# Patient Record
Sex: MALE | Race: WHITE | NOT HISPANIC OR LATINO | Employment: FULL TIME | URBAN - METROPOLITAN AREA
[De-identification: names, ages, dates, MRNs, and addresses within clinical notes are randomized per-mention and may not be internally consistent; named-entity substitution may affect disease eponyms.]

---

## 2022-11-25 ENCOUNTER — APPOINTMENT (OUTPATIENT)
Dept: CT IMAGING | Facility: HOSPITAL | Age: 57
End: 2022-11-25

## 2022-11-25 ENCOUNTER — HOSPITAL ENCOUNTER (EMERGENCY)
Facility: HOSPITAL | Age: 57
Discharge: HOME | End: 2022-11-25
Attending: EMERGENCY MEDICINE | Admitting: EMERGENCY MEDICINE

## 2022-11-25 VITALS
DIASTOLIC BLOOD PRESSURE: 68 MMHG | RESPIRATION RATE: 16 BRPM | BODY MASS INDEX: 28 KG/M2 | OXYGEN SATURATION: 98 % | SYSTOLIC BLOOD PRESSURE: 114 MMHG | TEMPERATURE: 97.6 F | HEIGHT: 71 IN | HEART RATE: 65 BPM | WEIGHT: 200 LBS

## 2022-11-25 DIAGNOSIS — K57.92 ACUTE DIVERTICULITIS: Primary | ICD-10-CM

## 2022-11-25 LAB
ALBUMIN SERPL-MCNC: 4.7 G/DL (ref 3.5–5.2)
ALBUMIN/GLOB SERPL: 1.7 G/DL
ALP SERPL-CCNC: 74 U/L (ref 39–117)
ALT SERPL W P-5'-P-CCNC: 36 U/L (ref 1–41)
ANION GAP SERPL CALCULATED.3IONS-SCNC: 12 MMOL/L (ref 5–15)
AST SERPL-CCNC: 27 U/L (ref 1–40)
BACTERIA UR QL AUTO: NORMAL /HPF
BASOPHILS # BLD AUTO: 0.05 10*3/MM3 (ref 0–0.2)
BASOPHILS NFR BLD AUTO: 0.5 % (ref 0–1.5)
BILIRUB SERPL-MCNC: 1.1 MG/DL (ref 0–1.2)
BILIRUB UR QL STRIP: NEGATIVE
BUN SERPL-MCNC: 11 MG/DL (ref 6–20)
BUN/CREAT SERPL: 12.4 (ref 7–25)
CALCIUM SPEC-SCNC: 9.4 MG/DL (ref 8.6–10.5)
CHLORIDE SERPL-SCNC: 101 MMOL/L (ref 98–107)
CLARITY UR: ABNORMAL
CO2 SERPL-SCNC: 25 MMOL/L (ref 22–29)
COLOR UR: ABNORMAL
CREAT SERPL-MCNC: 0.89 MG/DL (ref 0.76–1.27)
D-LACTATE SERPL-SCNC: 2 MMOL/L (ref 0.5–2)
DEPRECATED RDW RBC AUTO: 42.2 FL (ref 37–54)
EGFRCR SERPLBLD CKD-EPI 2021: 100.6 ML/MIN/1.73
EOSINOPHIL # BLD AUTO: 0.15 10*3/MM3 (ref 0–0.4)
EOSINOPHIL NFR BLD AUTO: 1.4 % (ref 0.3–6.2)
ERYTHROCYTE [DISTWIDTH] IN BLOOD BY AUTOMATED COUNT: 12.9 % (ref 12.3–15.4)
GLOBULIN UR ELPH-MCNC: 2.7 GM/DL
GLUCOSE SERPL-MCNC: 112 MG/DL (ref 65–99)
GLUCOSE UR STRIP-MCNC: NEGATIVE MG/DL
HCT VFR BLD AUTO: 42 % (ref 37.5–51)
HGB BLD-MCNC: 14.7 G/DL (ref 13–17.7)
HGB UR QL STRIP.AUTO: NEGATIVE
HOLD SPECIMEN: NORMAL
HYALINE CASTS UR QL AUTO: NORMAL /LPF
IMM GRANULOCYTES # BLD AUTO: 0.04 10*3/MM3 (ref 0–0.05)
IMM GRANULOCYTES NFR BLD AUTO: 0.4 % (ref 0–0.5)
KETONES UR QL STRIP: ABNORMAL
LEUKOCYTE ESTERASE UR QL STRIP.AUTO: ABNORMAL
LIPASE SERPL-CCNC: 61 U/L (ref 13–60)
LYMPHOCYTES # BLD AUTO: 2.2 10*3/MM3 (ref 0.7–3.1)
LYMPHOCYTES NFR BLD AUTO: 20 % (ref 19.6–45.3)
MAGNESIUM SERPL-MCNC: 2 MG/DL (ref 1.6–2.6)
MCH RBC QN AUTO: 32.1 PG (ref 26.6–33)
MCHC RBC AUTO-ENTMCNC: 35 G/DL (ref 31.5–35.7)
MCV RBC AUTO: 91.7 FL (ref 79–97)
MONOCYTES # BLD AUTO: 1.35 10*3/MM3 (ref 0.1–0.9)
MONOCYTES NFR BLD AUTO: 12.3 % (ref 5–12)
NEUTROPHILS NFR BLD AUTO: 65.4 % (ref 42.7–76)
NEUTROPHILS NFR BLD AUTO: 7.19 10*3/MM3 (ref 1.7–7)
NITRITE UR QL STRIP: NEGATIVE
NRBC BLD AUTO-RTO: 0 /100 WBC (ref 0–0.2)
PH UR STRIP.AUTO: 5.5 [PH] (ref 5–8)
PLATELET # BLD AUTO: 271 10*3/MM3 (ref 140–450)
PMV BLD AUTO: 9.1 FL (ref 6–12)
POTASSIUM SERPL-SCNC: 4 MMOL/L (ref 3.5–5.2)
PROT SERPL-MCNC: 7.4 G/DL (ref 6–8.5)
PROT UR QL STRIP: ABNORMAL
RBC # BLD AUTO: 4.58 10*6/MM3 (ref 4.14–5.8)
RBC # UR STRIP: NORMAL /HPF
REF LAB TEST METHOD: NORMAL
SODIUM SERPL-SCNC: 138 MMOL/L (ref 136–145)
SP GR UR STRIP: >=1.03 (ref 1–1.03)
SQUAMOUS #/AREA URNS HPF: NORMAL /HPF
UROBILINOGEN UR QL STRIP: ABNORMAL
WBC # UR STRIP: NORMAL /HPF
WBC NRBC COR # BLD: 10.98 10*3/MM3 (ref 3.4–10.8)
WHOLE BLOOD HOLD COAG: NORMAL
WHOLE BLOOD HOLD SPECIMEN: NORMAL

## 2022-11-25 PROCEDURE — 81001 URINALYSIS AUTO W/SCOPE: CPT | Performed by: EMERGENCY MEDICINE

## 2022-11-25 PROCEDURE — 25010000002 IOPAMIDOL 61 % SOLUTION: Performed by: EMERGENCY MEDICINE

## 2022-11-25 PROCEDURE — 96374 THER/PROPH/DIAG INJ IV PUSH: CPT

## 2022-11-25 PROCEDURE — 83735 ASSAY OF MAGNESIUM: CPT | Performed by: NURSE PRACTITIONER

## 2022-11-25 PROCEDURE — 85025 COMPLETE CBC W/AUTO DIFF WBC: CPT | Performed by: EMERGENCY MEDICINE

## 2022-11-25 PROCEDURE — 80053 COMPREHEN METABOLIC PANEL: CPT

## 2022-11-25 PROCEDURE — 83605 ASSAY OF LACTIC ACID: CPT

## 2022-11-25 PROCEDURE — 99284 EMERGENCY DEPT VISIT MOD MDM: CPT

## 2022-11-25 PROCEDURE — 83690 ASSAY OF LIPASE: CPT

## 2022-11-25 PROCEDURE — 74177 CT ABD & PELVIS W/CONTRAST: CPT

## 2022-11-25 PROCEDURE — 81003 URINALYSIS AUTO W/O SCOPE: CPT | Performed by: EMERGENCY MEDICINE

## 2022-11-25 RX ORDER — SILDENAFIL CITRATE 20 MG/1
3 TABLET ORAL DAILY PRN
COMMUNITY

## 2022-11-25 RX ORDER — HYDROCODONE BITARTRATE AND ACETAMINOPHEN 7.5; 325 MG/1; MG/1
1 TABLET ORAL EVERY 6 HOURS PRN
Qty: 12 TABLET | Refills: 0 | Status: SHIPPED | OUTPATIENT
Start: 2022-11-25

## 2022-11-25 RX ORDER — ATENOLOL 50 MG/1
50 TABLET ORAL DAILY
COMMUNITY

## 2022-11-25 RX ORDER — SODIUM CHLORIDE 9 MG/ML
10 INJECTION INTRAVENOUS AS NEEDED
Status: DISCONTINUED | OUTPATIENT
Start: 2022-11-25 | End: 2022-11-25 | Stop reason: HOSPADM

## 2022-11-25 RX ORDER — OMEPRAZOLE 40 MG/1
40 CAPSULE, DELAYED RELEASE ORAL 2 TIMES WEEKLY
COMMUNITY

## 2022-11-25 RX ORDER — FLUTICASONE PROPIONATE 50 MCG
2 SPRAY, SUSPENSION (ML) NASAL DAILY PRN
COMMUNITY

## 2022-11-25 RX ORDER — FAMOTIDINE 40 MG/1
40 TABLET, FILM COATED ORAL DAILY
COMMUNITY

## 2022-11-25 RX ORDER — ASPIRIN 81 MG/1
81 TABLET ORAL DAILY
COMMUNITY

## 2022-11-25 RX ORDER — TRIAMTERENE AND HYDROCHLOROTHIAZIDE 37.5; 25 MG/1; MG/1
1 CAPSULE ORAL DAILY
COMMUNITY

## 2022-11-25 RX ORDER — ONDANSETRON 4 MG/1
4 TABLET, ORALLY DISINTEGRATING ORAL EVERY 6 HOURS PRN
Qty: 20 TABLET | Refills: 0 | Status: SHIPPED | OUTPATIENT
Start: 2022-11-25

## 2022-11-25 RX ORDER — SODIUM CHLORIDE 0.9 % (FLUSH) 0.9 %
10 SYRINGE (ML) INJECTION AS NEEDED
Status: DISCONTINUED | OUTPATIENT
Start: 2022-11-25 | End: 2022-11-25 | Stop reason: HOSPADM

## 2022-11-25 RX ORDER — CIPROFLOXACIN 500 MG/1
500 TABLET, FILM COATED ORAL 2 TIMES DAILY
Qty: 20 TABLET | Refills: 0 | Status: SHIPPED | OUTPATIENT
Start: 2022-11-25

## 2022-11-25 RX ORDER — METRONIDAZOLE 500 MG/1
500 TABLET ORAL 2 TIMES DAILY
Qty: 14 TABLET | Refills: 0 | Status: SHIPPED | OUTPATIENT
Start: 2022-11-25

## 2022-11-25 RX ADMIN — SODIUM CHLORIDE 1000 ML: 9 INJECTION, SOLUTION INTRAVENOUS at 13:49

## 2022-11-25 RX ADMIN — IOPAMIDOL 100 ML: 612 INJECTION, SOLUTION INTRAVENOUS at 14:25

## 2022-11-25 NOTE — ED PROVIDER NOTES
EMERGENCY DEPARTMENT ENCOUNTER    Pt Name: Lobo Yu  MRN: [id number]  Pt :   1965  Room Number:    Date of encounter:  2022  PCP: Provider, No Known  ED Provider: FARA Cadena    Historian: Patient      HPI:  Chief Complaint: Abdominal pain        Context: Lobo Yu is a 57 y.o. male who presents to the ED c/o left lower quadrant abdominal pain intermittently for 2 weeks that has become consistently uncomfortable for the last 3 days.  He has noticed some chills but no fever.  He has no known history of GI disorder or abdominal surgery.  He has noticed no rectal bleeding.  He has experienced some nausea.    Review of systems is negative for fever.  Cardiovascular systems are negative.  Positive for nausea without vomiting or diarrhea or constipation or rectal pain.   systems are negative.  No profound weakness, dizziness or syncope.  No neurosensory complaint or focal weakness      PAST MEDICAL HISTORY  Past Medical History:   Diagnosis Date   • GERD (gastroesophageal reflux disease)    • Hypertension    • Pulmonary hypertension (HCC)          PAST SURGICAL HISTORY  Past Surgical History:   Procedure Laterality Date   • NEPHROLITHOTOMY     • TONSILLECTOMY           FAMILY HISTORY  History reviewed. No pertinent family history.      SOCIAL HISTORY  Social History     Socioeconomic History   • Marital status:          ALLERGIES  Amoxicillin        REVIEW OF SYSTEMS  Review of Systems     All systems reviewed and negative except for those discussed in HPI.       PHYSICAL EXAM    I have reviewed the triage vital signs and nursing notes.    ED Triage Vitals [22 1033]   Temp Heart Rate Resp BP SpO2   97.6 °F (36.4 °C) 74 16 (!) 144/101 97 %      Temp src Heart Rate Source Patient Position BP Location FiO2 (%)   Oral Monitor Sitting Left arm --       Physical Exam  GENERAL:   Appears in no acute distress.  His vital signs are normal.  He is a very good  historian  HENT: Nares patent.  EYES: No scleral icterus.  CV: Regular rhythm, regular rate.  No tachycardia.  No peripheral edema  RESPIRATORY: Normal effort.  No audible wheezes, rales or rhonchi.  ABDOMEN: Soft, very specific tenderness to the left lower quadrant without guarding.  No CVA tenderness.  MUSCULOSKELETAL: No deformities.   NEURO: Alert, moves all extremities, follows commands.  SKIN: Warm, dry, no rash visualized.        LAB RESULTS  Recent Results (from the past 24 hour(s))   Comprehensive Metabolic Panel    Collection Time: 11/25/22 10:44 AM    Specimen: Blood   Result Value Ref Range    Glucose 112 (H) 65 - 99 mg/dL    BUN 11 6 - 20 mg/dL    Creatinine 0.89 0.76 - 1.27 mg/dL    Sodium 138 136 - 145 mmol/L    Potassium 4.0 3.5 - 5.2 mmol/L    Chloride 101 98 - 107 mmol/L    CO2 25.0 22.0 - 29.0 mmol/L    Calcium 9.4 8.6 - 10.5 mg/dL    Total Protein 7.4 6.0 - 8.5 g/dL    Albumin 4.70 3.50 - 5.20 g/dL    ALT (SGPT) 36 1 - 41 U/L    AST (SGOT) 27 1 - 40 U/L    Alkaline Phosphatase 74 39 - 117 U/L    Total Bilirubin 1.1 0.0 - 1.2 mg/dL    Globulin 2.7 gm/dL    A/G Ratio 1.7 g/dL    BUN/Creatinine Ratio 12.4 7.0 - 25.0    Anion Gap 12.0 5.0 - 15.0 mmol/L    eGFR 100.6 >60.0 mL/min/1.73   Lipase    Collection Time: 11/25/22 10:44 AM    Specimen: Blood   Result Value Ref Range    Lipase 61 (H) 13 - 60 U/L   Lactic Acid, Plasma    Collection Time: 11/25/22 10:44 AM    Specimen: Blood   Result Value Ref Range    Lactate 2.0 0.5 - 2.0 mmol/L   Green Top (Gel)    Collection Time: 11/25/22 10:44 AM   Result Value Ref Range    Extra Tube Hold for add-ons.    Lavender Top    Collection Time: 11/25/22 10:44 AM   Result Value Ref Range    Extra Tube hold for add-on    Gold Top - SST    Collection Time: 11/25/22 10:44 AM   Result Value Ref Range    Extra Tube Hold for add-ons.    Gray Top    Collection Time: 11/25/22 10:44 AM   Result Value Ref Range    Extra Tube Hold for add-ons.    Light Blue Top    Collection  Time: 11/25/22 10:44 AM   Result Value Ref Range    Extra Tube Hold for add-ons.    CBC Auto Differential    Collection Time: 11/25/22 10:44 AM    Specimen: Blood   Result Value Ref Range    WBC 10.98 (H) 3.40 - 10.80 10*3/mm3    RBC 4.58 4.14 - 5.80 10*6/mm3    Hemoglobin 14.7 13.0 - 17.7 g/dL    Hematocrit 42.0 37.5 - 51.0 %    MCV 91.7 79.0 - 97.0 fL    MCH 32.1 26.6 - 33.0 pg    MCHC 35.0 31.5 - 35.7 g/dL    RDW 12.9 12.3 - 15.4 %    RDW-SD 42.2 37.0 - 54.0 fl    MPV 9.1 6.0 - 12.0 fL    Platelets 271 140 - 450 10*3/mm3    Neutrophil % 65.4 42.7 - 76.0 %    Lymphocyte % 20.0 19.6 - 45.3 %    Monocyte % 12.3 (H) 5.0 - 12.0 %    Eosinophil % 1.4 0.3 - 6.2 %    Basophil % 0.5 0.0 - 1.5 %    Immature Grans % 0.4 0.0 - 0.5 %    Neutrophils, Absolute 7.19 (H) 1.70 - 7.00 10*3/mm3    Lymphocytes, Absolute 2.20 0.70 - 3.10 10*3/mm3    Monocytes, Absolute 1.35 (H) 0.10 - 0.90 10*3/mm3    Eosinophils, Absolute 0.15 0.00 - 0.40 10*3/mm3    Basophils, Absolute 0.05 0.00 - 0.20 10*3/mm3    Immature Grans, Absolute 0.04 0.00 - 0.05 10*3/mm3    nRBC 0.0 0.0 - 0.2 /100 WBC   Magnesium    Collection Time: 11/25/22 10:44 AM    Specimen: Blood   Result Value Ref Range    Magnesium 2.0 1.6 - 2.6 mg/dL   Urinalysis With Microscopic If Indicated (No Culture) - Urine, Clean Catch    Collection Time: 11/25/22 10:46 AM    Specimen: Urine, Clean Catch   Result Value Ref Range    Color, UA Dark Yellow (A) Yellow, Straw    Appearance, UA Cloudy (A) Clear    pH, UA 5.5 5.0 - 8.0    Specific Gravity, UA >=1.030 1.001 - 1.030    Glucose, UA Negative Negative    Ketones, UA Trace (A) Negative    Bilirubin, UA Negative Negative    Blood, UA Negative Negative    Protein, UA Trace (A) Negative    Leuk Esterase, UA Trace (A) Negative    Nitrite, UA Negative Negative    Urobilinogen, UA 1.0 E.U./dL 0.2 - 1.0 E.U./dL   Urinalysis, Microscopic Only - Urine, Clean Catch    Collection Time: 11/25/22 10:46 AM    Specimen: Urine, Clean Catch   Result  Value Ref Range    RBC, UA 0-2 None Seen, 0-2 /HPF    WBC, UA 0-2 None Seen, 0-2 /HPF    Bacteria, UA None Seen None Seen, Trace /HPF    Squamous Epithelial Cells, UA 0-2 None Seen, 0-2 /HPF    Hyaline Casts, UA 0-6 0 - 6 /LPF    Methodology Automated Microscopy        If labs were ordered, I independently reviewed the results.        RADIOLOGY  CT Abdomen Pelvis With Contrast    Result Date: 11/25/2022  CT ABDOMEN PELVIS W CONTRAST-  Date of Exam: 11/25/2022 2:16 PM  Indication: LLQ abd pain; no hx of diverti dx;  hurting about 3 weeks.  Comparison: CT abdomen pelvis without contrast 1/10/2016.  Technique: Contiguous axial CT images were obtained from the lung bases to the pubic symphysis following uneventful administration of 100 cc Isovue-300 intravenous and oral contrast. Sagittal and coronal reconstructions were performed.  Automated exposure control and iterative reconstruction methods were used.  FINDINGS:  There is abnormal concentric thickening within the distal descending colon with pericolonic inflammatory stranding. Diverticular changes are present. FINDINGS are thought to reflect changes of focal acute diverticulitis. There is no evidence of gross perforation or abscess. There is no indication of upstream bowel obstruction.  The liver is steatotic, but remains of normal size. No focal liver lesion is seen.  The gallbladder, spleen, pancreas, adrenals, and left kidney are normal. Right renal cysts are present, largest in the posterior right mid kidney measuring up to 1.7 cm.  The appendix is normal. There are no pathologically enlarged lymph nodes.  Urinary bladder, prostate and rectum are normal.  There is a small esophageal hiatal hernia. Heart size is normal. The lung bases are clear. There are no acute or suspicious osseous abnormalities.        1. Acute diverticulitis within the distal descending colon. No evidence of gross diverticular perforation or abscess.    This report was finalized on  11/25/2022 2:38 PM by Ivanna Dong MD.          PROCEDURES    Procedures    No orders to display       MEDICATIONS GIVEN IN ER    Medications   Sodium Chloride (PF) 0.9 % 10 mL (has no administration in time range)   sodium chloride 0.9 % flush 10 mL (has no administration in time range)   sodium chloride 0.9 % bolus 1,000 mL (0 mL Intravenous Stopped 11/25/22 1546)   iopamidol (ISOVUE-300) 61 % injection 100 mL (100 mL Intravenous Given 11/25/22 1425)         ED Course as of 11/25/22 1734   Fri Nov 25, 2022   1234 WBC(!): 10.98 [MS]   1450 Patient's images are remarkable for acute diverticulitis.  Patient assures he can take and tolerate food and fluids in his medications as an outpatient.  We discussed parameters for concern that would warrant return to the emergency department.  Mr. Yu understands and concurs this outpatient plan and close follow-up [MS]      ED Course User Index  [MS] Pat Morales, FARA           AS OF 17:34 EST VITALS:    BP - 114/68  HR - 65  TEMP - 97.6 °F (36.4 °C) (Oral)  O2 SATS - 98%      NICHOLAS reviewed by Ozzy Alvarez DO           DIAGNOSIS  Final diagnoses:   Acute diverticulitis         DISPOSITION    DISCHARGE    Patient discharged in stable condition.    Reviewed implications of results, diagnosis, meds, responsibility to follow up, warning signs and symptoms of possible worsening, potential complications and reasons to return to ER.    Patient/Family voiced understanding of above instructions.    Discussed plan for discharge, as there is no emergent indication for admission.  Pt/family is agreeable and understands need for follow up and possible repeat testing.  Pt/family is aware that discharge does not mean that nothing is wrong but that it indicates no emergency is currently present that requires admission and they must continue care with follow-up as given below or with a physician of their choice.     FOLLOW-UP  Stefan Shannon MD  [id number]  [facility] [address]  [place] [id number] [phone]               Medication List      New Prescriptions    ciprofloxacin 500 MG tablet  Commonly known as: CIPRO  Take 1 tablet by mouth 2 (Two) Times a Day.     HYDROcodone-acetaminophen 7.5-325 MG per tablet  Commonly known as: NORCO  Take 1 tablet by mouth Every 6 (Six) Hours As Needed for Severe Pain.     metroNIDAZOLE 500 MG tablet  Commonly known as: FLAGYL  Take 1 tablet by mouth 2 (Two) Times a Day.     ondansetron ODT 4 MG disintegrating tablet  Commonly known as: ZOFRAN-ODT  Place 1 tablet on the tongue Every 6 (Six) Hours As Needed for Nausea or Vomiting.           Where to Get Your Medications      These medications were sent to [facility]/pharm[facility] #[id number] - [place], [place] - [address] [facility] [phone]  - [phone]   [address] [place] [id number]    Hours: 24-hours Phone: [phone]   · ciprofloxacin 500 MG tablet  · HYDROcodone-acetaminophen 7.5-325 MG per tablet  · metroNIDAZOLE 500 MG tablet  · ondansetron ODT 4 MG disintegrating tablet                  Pat Morales, APRN  11/25/22 173

## 2022-11-25 NOTE — DISCHARGE INSTRUCTIONS
Home to rest.  Maintain your very best hydration and nutrition.  Start your medications this evening.  Do not take any of them with an empty stomach, but rather with ample food and fluids.  Use the prescription pain medication for that pain that breaks through ibuprofen 600 mg every 8 hours.  Return to the emergency department as needed for worsening symptoms or concerns.  Thank you

## 2023-08-10 ENCOUNTER — DOCUMENTATION (OUTPATIENT)
Dept: GASTROENTEROLOGY | Facility: CLINIC | Age: 58
End: 2023-08-10
Payer: COMMERCIAL

## 2023-08-10 RX ORDER — METRONIDAZOLE 500 MG/1
500 TABLET ORAL 3 TIMES DAILY
Qty: 30 TABLET | Refills: 0 | Status: SHIPPED | OUTPATIENT
Start: 2023-08-10 | End: 2023-08-20

## 2023-08-10 RX ORDER — CIPROFLOXACIN 500 MG/1
500 TABLET, FILM COATED ORAL 2 TIMES DAILY
Qty: 20 TABLET | Refills: 0 | Status: SHIPPED | OUTPATIENT
Start: 2023-08-10 | End: 2023-08-20

## 2023-10-30 RX ORDER — SODIUM PICOSULFATE, MAGNESIUM OXIDE, AND ANHYDROUS CITRIC ACID 10; 3.5; 12 MG/160ML; G/160ML; G/160ML
350 LIQUID ORAL TAKE AS DIRECTED
Qty: 350 ML | Refills: 0 | Status: SHIPPED | OUTPATIENT
Start: 2023-10-30

## 2023-11-06 ENCOUNTER — OUTSIDE FACILITY SERVICE (OUTPATIENT)
Dept: GASTROENTEROLOGY | Facility: CLINIC | Age: 58
End: 2023-11-06
Payer: COMMERCIAL

## 2023-11-06 PROCEDURE — 45385 COLONOSCOPY W/LESION REMOVAL: CPT | Performed by: INTERNAL MEDICINE

## 2023-11-06 PROCEDURE — 45390 COLONOSCOPY W/RESECTION: CPT | Performed by: INTERNAL MEDICINE

## 2023-11-06 PROCEDURE — 88305 TISSUE EXAM BY PATHOLOGIST: CPT

## 2023-11-07 ENCOUNTER — TELEPHONE (OUTPATIENT)
Dept: GASTROENTEROLOGY | Facility: CLINIC | Age: 58
End: 2023-11-07

## 2023-11-07 ENCOUNTER — LAB REQUISITION (OUTPATIENT)
Dept: LAB | Facility: HOSPITAL | Age: 58
End: 2023-11-07
Payer: COMMERCIAL

## 2023-11-07 DIAGNOSIS — K57.30 DIVERTICULOSIS OF LARGE INTESTINE WITHOUT PERFORATION OR ABSCESS WITHOUT BLEEDING: ICD-10-CM

## 2023-11-07 DIAGNOSIS — Z12.11 ENCOUNTER FOR SCREENING FOR MALIGNANT NEOPLASM OF COLON: ICD-10-CM

## 2023-11-07 DIAGNOSIS — D12.0 BENIGN NEOPLASM OF CECUM: ICD-10-CM

## 2023-11-07 DIAGNOSIS — D12.5 BENIGN NEOPLASM OF SIGMOID COLON: ICD-10-CM

## 2023-11-07 DIAGNOSIS — K64.8 OTHER HEMORRHOIDS: ICD-10-CM

## 2023-11-07 NOTE — TELEPHONE ENCOUNTER
Patient said he is having abdominal pain and it feels like when he had diverticulitis. He just wanted you to be aware. 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

## 2023-11-07 NOTE — TELEPHONE ENCOUNTER
Hub staff attempted to follow warm transfer process and was unsuccessful     Caller: Lobo Yu    Relationship to patient: Self    Best call back number: [phone]     Patient is needing: PT IS HAVING ABNORMAL ABDOMINAL PAIN POST COLONOSCOPY. HE WAS CALLING TO UPDATE SYMPTOMS AS DIRECTED BY PHYSICIAN. PLEASE CALL AND ADVISE.

## 2023-11-08 ENCOUNTER — TELEPHONE (OUTPATIENT)
Dept: GASTROENTEROLOGY | Facility: CLINIC | Age: 58
End: 2023-11-08

## 2023-11-08 RX ORDER — METRONIDAZOLE 500 MG/1
500 TABLET ORAL 3 TIMES DAILY
Qty: 30 TABLET | Refills: 0 | Status: SHIPPED | OUTPATIENT
Start: 2023-11-08

## 2023-11-08 NOTE — TELEPHONE ENCOUNTER
Hub staff attempted to follow warm transfer process and was unsuccessful     Caller: JACOB CUEVA    Relationship to patient: SELF    Best call back number: [phone]    Patient is needing: STILL EXPERIENCING PAIN FROM COLONOSCOPY, DR. DE LEON LEFT HIM A MESSAGE SAYING ANTIBIOTICS COULD BE CALLED IN. 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

## 2023-11-08 NOTE — TELEPHONE ENCOUNTER
Spoke with patient and he advised he doesn't have a fever. I advised if he doesn't have a fever and he rates his pain at a 4. He states if it gets worse or he develops a fever he will go to ED.

## 2023-11-09 LAB — REF LAB TEST METHOD: NORMAL

## 2023-11-17 ENCOUNTER — TELEPHONE (OUTPATIENT)
Dept: GASTROENTEROLOGY | Facility: CLINIC | Age: 58
End: 2023-11-17

## 2023-11-17 DIAGNOSIS — K57.92 DIVERTICULITIS: Primary | ICD-10-CM

## 2023-11-17 RX ORDER — DICYCLOMINE HYDROCHLORIDE 10 MG/1
20 CAPSULE ORAL 3 TIMES DAILY PRN
Qty: 180 CAPSULE | Refills: 3 | Status: ON HOLD | OUTPATIENT
Start: 2023-11-17

## 2023-11-17 RX ORDER — METRONIDAZOLE 500 MG/1
500 TABLET ORAL 3 TIMES DAILY
Qty: 30 TABLET | Refills: 0 | Status: ON HOLD | OUTPATIENT
Start: 2023-11-17

## 2023-11-17 RX ORDER — CIPROFLOXACIN 500 MG/1
500 TABLET, FILM COATED ORAL 2 TIMES DAILY
Qty: 20 TABLET | Refills: 0 | Status: ON HOLD | OUTPATIENT
Start: 2023-11-17

## 2023-11-17 NOTE — TELEPHONE ENCOUNTER
I spoke with patient and he is in a lot of pain, still no fever. He is in [place] and doesn't want to go to the er there, he is coming back Sunday. He wants to know what you suggest and possibly give him a call if you can.

## 2023-11-17 NOTE — TELEPHONE ENCOUNTER
Hub staff attempted to follow warm transfer process and was unsuccessful     Caller: Lobo Yu    Relationship to patient: Self    Best call back number: [phone]     Patient is needing: PATIENT HAD A PROCEDURE WITH DR DE LEON ON 11/6 AND HE WAS PRESCRIBED SOME MEDS AND WAS FEELING OK HOWEVER PT TOOK A TURN FOR THE WORST ON 11/16 AND HAVING A LOT OF PAINS AGAIN. WOULD LIKE TO SPEAK WITH DR DE LEON. PLEASE HAVE HIM CALL THE PATIENT.

## 2023-11-20 ENCOUNTER — HOSPITAL ENCOUNTER (INPATIENT)
Facility: HOSPITAL | Age: 58
LOS: 7 days | Discharge: HOME HEALTH | DRG: 329 | End: 2023-11-27
Attending: EMERGENCY MEDICINE | Admitting: INTERNAL MEDICINE
Payer: COMMERCIAL

## 2023-11-20 ENCOUNTER — APPOINTMENT (OUTPATIENT)
Dept: CT IMAGING | Facility: HOSPITAL | Age: 58
DRG: 329 | End: 2023-11-20
Payer: COMMERCIAL

## 2023-11-20 ENCOUNTER — ANESTHESIA EVENT CONVERTED (OUTPATIENT)
Dept: ANESTHESIOLOGY | Facility: HOSPITAL | Age: 58
DRG: 329 | End: 2023-11-20
Payer: COMMERCIAL

## 2023-11-20 ENCOUNTER — ANESTHESIA EVENT (OUTPATIENT)
Dept: PERIOP | Facility: HOSPITAL | Age: 58
End: 2023-11-20
Payer: COMMERCIAL

## 2023-11-20 ENCOUNTER — ANESTHESIA (OUTPATIENT)
Dept: PERIOP | Facility: HOSPITAL | Age: 58
End: 2023-11-20
Payer: COMMERCIAL

## 2023-11-20 DIAGNOSIS — K57.80 PERFORATED DIVERTICULUM: ICD-10-CM

## 2023-11-20 DIAGNOSIS — K57.92 DIVERTICULITIS: ICD-10-CM

## 2023-11-20 DIAGNOSIS — K57.20 DIVERTICULITIS OF COLON WITH PERFORATION: Primary | ICD-10-CM

## 2023-11-20 DIAGNOSIS — I27.20 PULMONARY HYPERTENSION: ICD-10-CM

## 2023-11-20 DIAGNOSIS — I10 ESSENTIAL HYPERTENSION: ICD-10-CM

## 2023-11-20 LAB
ABO GROUP BLD: NORMAL
ABO GROUP BLD: NORMAL
ALBUMIN SERPL-MCNC: 4 G/DL (ref 3.5–5.2)
ALBUMIN/GLOB SERPL: 1.2 G/DL
ALP SERPL-CCNC: 71 U/L (ref 39–117)
ALT SERPL W P-5'-P-CCNC: 19 U/L (ref 1–41)
ANION GAP SERPL CALCULATED.3IONS-SCNC: 11 MMOL/L (ref 5–15)
AST SERPL-CCNC: 16 U/L (ref 1–40)
BACTERIA UR QL AUTO: NORMAL /HPF
BASOPHILS # BLD AUTO: 0.02 10*3/MM3 (ref 0–0.2)
BASOPHILS NFR BLD AUTO: 0.2 % (ref 0–1.5)
BILIRUB SERPL-MCNC: 0.8 MG/DL (ref 0–1.2)
BILIRUB UR QL STRIP: NEGATIVE
BLD GP AB SCN SERPL QL: NEGATIVE
BUN SERPL-MCNC: 10 MG/DL (ref 6–20)
BUN/CREAT SERPL: 10.1 (ref 7–25)
CALCIUM SPEC-SCNC: 9.1 MG/DL (ref 8.6–10.5)
CHLORIDE SERPL-SCNC: 102 MMOL/L (ref 98–107)
CLARITY UR: CLEAR
CO2 SERPL-SCNC: 27 MMOL/L (ref 22–29)
COLOR UR: YELLOW
CREAT SERPL-MCNC: 0.99 MG/DL (ref 0.76–1.27)
D-LACTATE SERPL-SCNC: 1 MMOL/L (ref 0.5–2)
DEPRECATED RDW RBC AUTO: 44.3 FL (ref 37–54)
EGFRCR SERPLBLD CKD-EPI 2021: 88.9 ML/MIN/1.73
EOSINOPHIL # BLD AUTO: 0.14 10*3/MM3 (ref 0–0.4)
EOSINOPHIL NFR BLD AUTO: 1.4 % (ref 0.3–6.2)
ERYTHROCYTE [DISTWIDTH] IN BLOOD BY AUTOMATED COUNT: 12.8 % (ref 12.3–15.4)
GLOBULIN UR ELPH-MCNC: 3.4 GM/DL
GLUCOSE SERPL-MCNC: 122 MG/DL (ref 65–99)
GLUCOSE UR STRIP-MCNC: NEGATIVE MG/DL
HCT VFR BLD AUTO: 41.5 % (ref 37.5–51)
HGB BLD-MCNC: 14 G/DL (ref 13–17.7)
HGB UR QL STRIP.AUTO: NEGATIVE
HOLD SPECIMEN: NORMAL
HYALINE CASTS UR QL AUTO: NORMAL /LPF
IMM GRANULOCYTES # BLD AUTO: 0.06 10*3/MM3 (ref 0–0.05)
IMM GRANULOCYTES NFR BLD AUTO: 0.6 % (ref 0–0.5)
KETONES UR QL STRIP: NEGATIVE
LEUKOCYTE ESTERASE UR QL STRIP.AUTO: ABNORMAL
LIPASE SERPL-CCNC: 49 U/L (ref 13–60)
LYMPHOCYTES # BLD AUTO: 1.3 10*3/MM3 (ref 0.7–3.1)
LYMPHOCYTES NFR BLD AUTO: 12.9 % (ref 19.6–45.3)
MCH RBC QN AUTO: 31.6 PG (ref 26.6–33)
MCHC RBC AUTO-ENTMCNC: 33.7 G/DL (ref 31.5–35.7)
MCV RBC AUTO: 93.7 FL (ref 79–97)
MONOCYTES # BLD AUTO: 1.18 10*3/MM3 (ref 0.1–0.9)
MONOCYTES NFR BLD AUTO: 11.7 % (ref 5–12)
NEUTROPHILS NFR BLD AUTO: 7.41 10*3/MM3 (ref 1.7–7)
NEUTROPHILS NFR BLD AUTO: 73.2 % (ref 42.7–76)
NITRITE UR QL STRIP: NEGATIVE
NRBC BLD AUTO-RTO: 0 /100 WBC (ref 0–0.2)
PH UR STRIP.AUTO: 5.5 [PH] (ref 5–8)
PLATELET # BLD AUTO: 285 10*3/MM3 (ref 140–450)
PMV BLD AUTO: 9.8 FL (ref 6–12)
POTASSIUM SERPL-SCNC: 3.6 MMOL/L (ref 3.5–5.2)
PROCALCITONIN SERPL-MCNC: 0.34 NG/ML (ref 0–0.25)
PROT SERPL-MCNC: 7.4 G/DL (ref 6–8.5)
PROT UR QL STRIP: NEGATIVE
RBC # BLD AUTO: 4.43 10*6/MM3 (ref 4.14–5.8)
RBC # UR STRIP: NORMAL /HPF
REF LAB TEST METHOD: NORMAL
RH BLD: NEGATIVE
RH BLD: NEGATIVE
SODIUM SERPL-SCNC: 140 MMOL/L (ref 136–145)
SP GR UR STRIP: 1.02 (ref 1–1.03)
SQUAMOUS #/AREA URNS HPF: NORMAL /HPF
T&S EXPIRATION DATE: NORMAL
UROBILINOGEN UR QL STRIP: ABNORMAL
WBC # UR STRIP: NORMAL /HPF
WBC NRBC COR # BLD AUTO: 10.11 10*3/MM3 (ref 3.4–10.8)
WHOLE BLOOD HOLD COAG: NORMAL
WHOLE BLOOD HOLD SPECIMEN: NORMAL

## 2023-11-20 PROCEDURE — 25810000003 SODIUM CHLORIDE 0.9 % SOLUTION: Performed by: EMERGENCY MEDICINE

## 2023-11-20 PROCEDURE — 25010000002 PIPERACILLIN SOD-TAZOBACTAM PER 1 G: Performed by: EMERGENCY MEDICINE

## 2023-11-20 PROCEDURE — G0378 HOSPITAL OBSERVATION PER HR: HCPCS

## 2023-11-20 PROCEDURE — 25010000002 HYDROMORPHONE PER 4 MG: Performed by: FAMILY MEDICINE

## 2023-11-20 PROCEDURE — 25010000002 HYDROMORPHONE PER 4 MG: Performed by: ANESTHESIOLOGY

## 2023-11-20 PROCEDURE — 25810000003 SODIUM CHLORIDE 0.9 % SOLUTION: Performed by: FAMILY MEDICINE

## 2023-11-20 PROCEDURE — 88307 TISSUE EXAM BY PATHOLOGIST: CPT | Performed by: SURGERY

## 2023-11-20 PROCEDURE — 0DTJ0ZZ RESECTION OF APPENDIX, OPEN APPROACH: ICD-10-PCS | Performed by: SURGERY

## 2023-11-20 PROCEDURE — 25510000001 IOPAMIDOL PER 1 ML: Performed by: EMERGENCY MEDICINE

## 2023-11-20 PROCEDURE — 25010000002 ERTAPENEM PER 500 MG: Performed by: FAMILY MEDICINE

## 2023-11-20 PROCEDURE — 74177 CT ABD & PELVIS W/CONTRAST: CPT

## 2023-11-20 PROCEDURE — 25010000002 ONDANSETRON PER 1 MG: Performed by: EMERGENCY MEDICINE

## 2023-11-20 PROCEDURE — 25010000002 SUGAMMADEX 200 MG/2ML SOLUTION: Performed by: ANESTHESIOLOGY

## 2023-11-20 PROCEDURE — 25010000002 FENTANYL CITRATE (PF) 50 MCG/ML SOLUTION: Performed by: ANESTHESIOLOGY

## 2023-11-20 PROCEDURE — 0DBN0ZZ EXCISION OF SIGMOID COLON, OPEN APPROACH: ICD-10-PCS | Performed by: SURGERY

## 2023-11-20 PROCEDURE — 86901 BLOOD TYPING SEROLOGIC RH(D): CPT

## 2023-11-20 PROCEDURE — 25810000003 LACTATED RINGERS PER 1000 ML: Performed by: ANESTHESIOLOGY

## 2023-11-20 PROCEDURE — 0D1N0Z4 BYPASS SIGMOID COLON TO CUTANEOUS, OPEN APPROACH: ICD-10-PCS | Performed by: SURGERY

## 2023-11-20 PROCEDURE — 25010000002 BUPIVACAINE (PF) 0.25 % SOLUTION: Performed by: NURSE ANESTHETIST, CERTIFIED REGISTERED

## 2023-11-20 PROCEDURE — 83690 ASSAY OF LIPASE: CPT | Performed by: EMERGENCY MEDICINE

## 2023-11-20 PROCEDURE — 25010000002 PROPOFOL 10 MG/ML EMULSION: Performed by: ANESTHESIOLOGY

## 2023-11-20 PROCEDURE — 36415 COLL VENOUS BLD VENIPUNCTURE: CPT

## 2023-11-20 PROCEDURE — 25010000002 DEXAMETHASONE PER 1 MG: Performed by: ANESTHESIOLOGY

## 2023-11-20 PROCEDURE — 83605 ASSAY OF LACTIC ACID: CPT | Performed by: EMERGENCY MEDICINE

## 2023-11-20 PROCEDURE — 80053 COMPREHEN METABOLIC PANEL: CPT | Performed by: EMERGENCY MEDICINE

## 2023-11-20 PROCEDURE — 87040 BLOOD CULTURE FOR BACTERIA: CPT | Performed by: EMERGENCY MEDICINE

## 2023-11-20 PROCEDURE — 25810000003 LACTATED RINGERS PER 1000 ML: Performed by: SURGERY

## 2023-11-20 PROCEDURE — 84145 PROCALCITONIN (PCT): CPT | Performed by: EMERGENCY MEDICINE

## 2023-11-20 PROCEDURE — 25010000002 FENTANYL CITRATE (PF) 50 MCG/ML SOLUTION

## 2023-11-20 PROCEDURE — 99223 1ST HOSP IP/OBS HIGH 75: CPT | Performed by: FAMILY MEDICINE

## 2023-11-20 PROCEDURE — 88302 TISSUE EXAM BY PATHOLOGIST: CPT | Performed by: SURGERY

## 2023-11-20 PROCEDURE — 0W9G0ZZ DRAINAGE OF PERITONEAL CAVITY, OPEN APPROACH: ICD-10-PCS | Performed by: SURGERY

## 2023-11-20 PROCEDURE — 25010000002 ERTAPENEM PER 500 MG: Performed by: INTERNAL MEDICINE

## 2023-11-20 PROCEDURE — 99285 EMERGENCY DEPT VISIT HI MDM: CPT

## 2023-11-20 PROCEDURE — 85025 COMPLETE CBC W/AUTO DIFF WBC: CPT | Performed by: EMERGENCY MEDICINE

## 2023-11-20 PROCEDURE — 86900 BLOOD TYPING SEROLOGIC ABO: CPT

## 2023-11-20 PROCEDURE — 81001 URINALYSIS AUTO W/SCOPE: CPT | Performed by: EMERGENCY MEDICINE

## 2023-11-20 PROCEDURE — 86850 RBC ANTIBODY SCREEN: CPT | Performed by: SURGERY

## 2023-11-20 PROCEDURE — 86901 BLOOD TYPING SEROLOGIC RH(D): CPT | Performed by: SURGERY

## 2023-11-20 PROCEDURE — 25010000002 DEXAMETHASONE SODIUM PHOSPHATE 10 MG/ML SOLUTION: Performed by: NURSE ANESTHETIST, CERTIFIED REGISTERED

## 2023-11-20 PROCEDURE — 25810000003 SODIUM CHLORIDE 0.9 % SOLUTION: Performed by: SURGERY

## 2023-11-20 PROCEDURE — 86900 BLOOD TYPING SEROLOGIC ABO: CPT | Performed by: SURGERY

## 2023-11-20 PROCEDURE — 25010000002 LIDOCAINE 1 % SOLUTION: Performed by: ANESTHESIOLOGY

## 2023-11-20 DEVICE — PROXIMATE RELOADABLE LINEAR CUTTER WITH SAFETY LOCK-OUT.  55MM LINEAR CUTTER.
Type: IMPLANTABLE DEVICE | Site: ABDOMEN | Status: FUNCTIONAL
Brand: PROXIMATE

## 2023-11-20 DEVICE — ECHELON CONTOUR GST RELOAD GREEN
Type: IMPLANTABLE DEVICE | Site: ABDOMEN | Status: FUNCTIONAL
Brand: ECHELON

## 2023-11-20 DEVICE — ECHELON CONTOUR W/ GREEN RELOAD
Type: IMPLANTABLE DEVICE | Site: ABDOMEN | Status: FUNCTIONAL
Brand: ECHELON

## 2023-11-20 DEVICE — LIGACLIP MCA MULTIPLE CLIP APPLIERS, 20 MEDIUM CLIPS
Type: IMPLANTABLE DEVICE | Site: ABDOMEN | Status: FUNCTIONAL
Brand: LIGACLIP

## 2023-11-20 RX ORDER — SODIUM CHLORIDE 9 MG/ML
10 INJECTION INTRAVENOUS AS NEEDED
Status: DISCONTINUED | OUTPATIENT
Start: 2023-11-20 | End: 2023-11-27 | Stop reason: HOSPADM

## 2023-11-20 RX ORDER — AMOXICILLIN 250 MG
2 CAPSULE ORAL 2 TIMES DAILY
Status: DISCONTINUED | OUTPATIENT
Start: 2023-11-20 | End: 2023-11-21

## 2023-11-20 RX ORDER — MORPHINE SULFATE 2 MG/ML
2 INJECTION, SOLUTION INTRAMUSCULAR; INTRAVENOUS EVERY 4 HOURS PRN
Status: DISCONTINUED | OUTPATIENT
Start: 2023-11-20 | End: 2023-11-21 | Stop reason: SDUPTHER

## 2023-11-20 RX ORDER — HYDROCODONE BITARTRATE AND ACETAMINOPHEN 5; 325 MG/1; MG/1
1 TABLET ORAL EVERY 4 HOURS PRN
Status: DISCONTINUED | OUTPATIENT
Start: 2023-11-20 | End: 2023-11-21 | Stop reason: SDUPTHER

## 2023-11-20 RX ORDER — MORPHINE SULFATE 4 MG/ML
4 INJECTION, SOLUTION INTRAMUSCULAR; INTRAVENOUS EVERY 4 HOURS PRN
Status: DISCONTINUED | OUTPATIENT
Start: 2023-11-20 | End: 2023-11-20

## 2023-11-20 RX ORDER — SODIUM CHLORIDE 9 MG/ML
40 INJECTION, SOLUTION INTRAVENOUS AS NEEDED
Status: CANCELLED | OUTPATIENT
Start: 2023-11-20

## 2023-11-20 RX ORDER — LIDOCAINE HYDROCHLORIDE 10 MG/ML
0.5 INJECTION, SOLUTION EPIDURAL; INFILTRATION; INTRACAUDAL; PERINEURAL ONCE AS NEEDED
Status: CANCELLED | OUTPATIENT
Start: 2023-11-20

## 2023-11-20 RX ORDER — EPHEDRINE SULFATE 50 MG/ML
INJECTION, SOLUTION INTRAVENOUS AS NEEDED
Status: DISCONTINUED | OUTPATIENT
Start: 2023-11-20 | End: 2023-11-20 | Stop reason: SURG

## 2023-11-20 RX ORDER — SODIUM CHLORIDE 9 MG/ML
40 INJECTION, SOLUTION INTRAVENOUS AS NEEDED
Status: DISCONTINUED | OUTPATIENT
Start: 2023-11-20 | End: 2023-11-27 | Stop reason: HOSPADM

## 2023-11-20 RX ORDER — FENTANYL CITRATE 50 UG/ML
50 INJECTION, SOLUTION INTRAMUSCULAR; INTRAVENOUS
Status: DISCONTINUED | OUTPATIENT
Start: 2023-11-20 | End: 2023-11-20 | Stop reason: HOSPADM

## 2023-11-20 RX ORDER — ONDANSETRON 4 MG/1
4 TABLET, FILM COATED ORAL EVERY 6 HOURS PRN
Status: DISCONTINUED | OUTPATIENT
Start: 2023-11-20 | End: 2023-11-27 | Stop reason: HOSPADM

## 2023-11-20 RX ORDER — ONDANSETRON 2 MG/ML
4 INJECTION INTRAMUSCULAR; INTRAVENOUS EVERY 6 HOURS PRN
Status: DISCONTINUED | OUTPATIENT
Start: 2023-11-20 | End: 2023-11-20 | Stop reason: SDUPTHER

## 2023-11-20 RX ORDER — HYDROCODONE BITARTRATE AND ACETAMINOPHEN 5; 325 MG/1; MG/1
1 TABLET ORAL EVERY 6 HOURS PRN
Status: DISCONTINUED | OUTPATIENT
Start: 2023-11-20 | End: 2023-11-20 | Stop reason: SDUPTHER

## 2023-11-20 RX ORDER — FAMOTIDINE 10 MG/ML
20 INJECTION, SOLUTION INTRAVENOUS ONCE
Status: CANCELLED | OUTPATIENT
Start: 2023-11-20 | End: 2023-11-20

## 2023-11-20 RX ORDER — PROPOFOL 10 MG/ML
VIAL (ML) INTRAVENOUS AS NEEDED
Status: DISCONTINUED | OUTPATIENT
Start: 2023-11-20 | End: 2023-11-20 | Stop reason: SURG

## 2023-11-20 RX ORDER — SODIUM CHLORIDE 0.9 % (FLUSH) 0.9 %
10 SYRINGE (ML) INJECTION AS NEEDED
Status: CANCELLED | OUTPATIENT
Start: 2023-11-20

## 2023-11-20 RX ORDER — ACETAMINOPHEN 650 MG/1
650 SUPPOSITORY RECTAL EVERY 4 HOURS PRN
Status: DISCONTINUED | OUTPATIENT
Start: 2023-11-20 | End: 2023-11-21

## 2023-11-20 RX ORDER — ASPIRIN 81 MG/1
81 TABLET ORAL DAILY
Status: DISCONTINUED | OUTPATIENT
Start: 2023-11-21 | End: 2023-11-27 | Stop reason: HOSPADM

## 2023-11-20 RX ORDER — DEXAMETHASONE SODIUM PHOSPHATE 10 MG/ML
INJECTION, SOLUTION INTRAMUSCULAR; INTRAVENOUS
Status: COMPLETED | OUTPATIENT
Start: 2023-11-20 | End: 2023-11-20

## 2023-11-20 RX ORDER — OXYCODONE HYDROCHLORIDE AND ACETAMINOPHEN 5; 325 MG/1; MG/1
1 TABLET ORAL ONCE
Status: COMPLETED | OUTPATIENT
Start: 2023-11-20 | End: 2023-11-20

## 2023-11-20 RX ORDER — SODIUM CHLORIDE 0.9 % (FLUSH) 0.9 %
10 SYRINGE (ML) INJECTION AS NEEDED
Status: DISCONTINUED | OUTPATIENT
Start: 2023-11-20 | End: 2023-11-27 | Stop reason: HOSPADM

## 2023-11-20 RX ORDER — OXYCODONE AND ACETAMINOPHEN 10; 325 MG/1; MG/1
1 TABLET ORAL EVERY 4 HOURS PRN
Status: DISCONTINUED | OUTPATIENT
Start: 2023-11-20 | End: 2023-11-21 | Stop reason: SDUPTHER

## 2023-11-20 RX ORDER — MAGNESIUM HYDROXIDE 1200 MG/15ML
LIQUID ORAL AS NEEDED
Status: DISCONTINUED | OUTPATIENT
Start: 2023-11-20 | End: 2023-11-20 | Stop reason: HOSPADM

## 2023-11-20 RX ORDER — CARISOPRODOL 350 MG/1
350 TABLET ORAL EVERY 8 HOURS PRN
Status: DISCONTINUED | OUTPATIENT
Start: 2023-11-20 | End: 2023-11-27

## 2023-11-20 RX ORDER — FENTANYL CITRATE 50 UG/ML
INJECTION, SOLUTION INTRAMUSCULAR; INTRAVENOUS AS NEEDED
Status: DISCONTINUED | OUTPATIENT
Start: 2023-11-20 | End: 2023-11-20 | Stop reason: SURG

## 2023-11-20 RX ORDER — SODIUM CHLORIDE, SODIUM LACTATE, POTASSIUM CHLORIDE, CALCIUM CHLORIDE 600; 310; 30; 20 MG/100ML; MG/100ML; MG/100ML; MG/100ML
9 INJECTION, SOLUTION INTRAVENOUS CONTINUOUS
Status: DISCONTINUED | OUTPATIENT
Start: 2023-11-20 | End: 2023-11-21

## 2023-11-20 RX ORDER — SODIUM CHLORIDE 0.9 % (FLUSH) 0.9 %
10 SYRINGE (ML) INJECTION EVERY 12 HOURS SCHEDULED
Status: CANCELLED | OUTPATIENT
Start: 2023-11-20

## 2023-11-20 RX ORDER — HYDROMORPHONE HYDROCHLORIDE 1 MG/ML
0.5 INJECTION, SOLUTION INTRAMUSCULAR; INTRAVENOUS; SUBCUTANEOUS
Status: DISCONTINUED | OUTPATIENT
Start: 2023-11-20 | End: 2023-11-21

## 2023-11-20 RX ORDER — ONDANSETRON 2 MG/ML
4 INJECTION INTRAMUSCULAR; INTRAVENOUS EVERY 6 HOURS PRN
Status: DISCONTINUED | OUTPATIENT
Start: 2023-11-20 | End: 2023-11-27 | Stop reason: HOSPADM

## 2023-11-20 RX ORDER — ONDANSETRON 2 MG/ML
4 INJECTION INTRAMUSCULAR; INTRAVENOUS ONCE
Status: COMPLETED | OUTPATIENT
Start: 2023-11-20 | End: 2023-11-20

## 2023-11-20 RX ORDER — LABETALOL HYDROCHLORIDE 5 MG/ML
10 INJECTION, SOLUTION INTRAVENOUS EVERY 4 HOURS PRN
Status: DISCONTINUED | OUTPATIENT
Start: 2023-11-20 | End: 2023-11-27 | Stop reason: HOSPADM

## 2023-11-20 RX ORDER — NALOXONE HCL 0.4 MG/ML
0.4 VIAL (ML) INJECTION
Status: DISCONTINUED | OUTPATIENT
Start: 2023-11-20 | End: 2023-11-21

## 2023-11-20 RX ORDER — MIDAZOLAM HYDROCHLORIDE 1 MG/ML
1 INJECTION INTRAMUSCULAR; INTRAVENOUS
Status: DISCONTINUED | OUTPATIENT
Start: 2023-11-20 | End: 2023-11-20 | Stop reason: HOSPADM

## 2023-11-20 RX ORDER — HYDROMORPHONE HYDROCHLORIDE 1 MG/ML
0.5 INJECTION, SOLUTION INTRAMUSCULAR; INTRAVENOUS; SUBCUTANEOUS ONCE
Status: DISCONTINUED | OUTPATIENT
Start: 2023-11-20 | End: 2023-11-20

## 2023-11-20 RX ORDER — NITROGLYCERIN 0.4 MG/1
0.4 TABLET SUBLINGUAL
Status: DISCONTINUED | OUTPATIENT
Start: 2023-11-20 | End: 2023-11-27 | Stop reason: HOSPADM

## 2023-11-20 RX ORDER — ACETAMINOPHEN 160 MG/5ML
650 SOLUTION ORAL EVERY 4 HOURS PRN
Status: DISCONTINUED | OUTPATIENT
Start: 2023-11-20 | End: 2023-11-24

## 2023-11-20 RX ORDER — BUPIVACAINE HYDROCHLORIDE 2.5 MG/ML
INJECTION, SOLUTION EPIDURAL; INFILTRATION; INTRACAUDAL
Status: COMPLETED | OUTPATIENT
Start: 2023-11-20 | End: 2023-11-20

## 2023-11-20 RX ORDER — SODIUM CHLORIDE 9 MG/ML
75 INJECTION, SOLUTION INTRAVENOUS CONTINUOUS
Status: DISCONTINUED | OUTPATIENT
Start: 2023-11-20 | End: 2023-11-26

## 2023-11-20 RX ORDER — HYDROMORPHONE HYDROCHLORIDE 1 MG/ML
0.5 INJECTION, SOLUTION INTRAMUSCULAR; INTRAVENOUS; SUBCUTANEOUS
Status: DISCONTINUED | OUTPATIENT
Start: 2023-11-20 | End: 2023-11-20 | Stop reason: HOSPADM

## 2023-11-20 RX ORDER — FAMOTIDINE 20 MG/1
20 TABLET, FILM COATED ORAL ONCE
Status: COMPLETED | OUTPATIENT
Start: 2023-11-20 | End: 2023-11-20

## 2023-11-20 RX ORDER — BISACODYL 10 MG
10 SUPPOSITORY, RECTAL RECTAL DAILY PRN
Status: DISCONTINUED | OUTPATIENT
Start: 2023-11-20 | End: 2023-11-21

## 2023-11-20 RX ORDER — SODIUM CHLORIDE, SODIUM LACTATE, POTASSIUM CHLORIDE, CALCIUM CHLORIDE 600; 310; 30; 20 MG/100ML; MG/100ML; MG/100ML; MG/100ML
9 INJECTION, SOLUTION INTRAVENOUS CONTINUOUS
Status: CANCELLED | OUTPATIENT
Start: 2023-11-20

## 2023-11-20 RX ORDER — SODIUM CHLORIDE 0.9 % (FLUSH) 0.9 %
10 SYRINGE (ML) INJECTION EVERY 12 HOURS SCHEDULED
Status: DISCONTINUED | OUTPATIENT
Start: 2023-11-20 | End: 2023-11-27 | Stop reason: HOSPADM

## 2023-11-20 RX ORDER — DEXAMETHASONE SODIUM PHOSPHATE 10 MG/ML
INJECTION INTRAMUSCULAR; INTRAVENOUS AS NEEDED
Status: DISCONTINUED | OUTPATIENT
Start: 2023-11-20 | End: 2023-11-20 | Stop reason: SURG

## 2023-11-20 RX ORDER — ACETAMINOPHEN 325 MG/1
650 TABLET ORAL EVERY 4 HOURS PRN
Status: DISCONTINUED | OUTPATIENT
Start: 2023-11-20 | End: 2023-11-27 | Stop reason: HOSPADM

## 2023-11-20 RX ORDER — NALOXONE HCL 0.4 MG/ML
0.4 VIAL (ML) INJECTION
Status: DISCONTINUED | OUTPATIENT
Start: 2023-11-20 | End: 2023-11-21 | Stop reason: SDUPTHER

## 2023-11-20 RX ORDER — BISACODYL 5 MG/1
5 TABLET, DELAYED RELEASE ORAL DAILY PRN
Status: DISCONTINUED | OUTPATIENT
Start: 2023-11-20 | End: 2023-11-21

## 2023-11-20 RX ORDER — ROCURONIUM BROMIDE 10 MG/ML
INJECTION, SOLUTION INTRAVENOUS AS NEEDED
Status: DISCONTINUED | OUTPATIENT
Start: 2023-11-20 | End: 2023-11-20 | Stop reason: SURG

## 2023-11-20 RX ORDER — FENTANYL CITRATE 50 UG/ML
INJECTION, SOLUTION INTRAMUSCULAR; INTRAVENOUS
Status: COMPLETED
Start: 2023-11-20 | End: 2023-11-20

## 2023-11-20 RX ORDER — POLYETHYLENE GLYCOL 3350 17 G/17G
17 POWDER, FOR SOLUTION ORAL DAILY PRN
Status: DISCONTINUED | OUTPATIENT
Start: 2023-11-20 | End: 2023-11-21

## 2023-11-20 RX ORDER — LIDOCAINE HYDROCHLORIDE 10 MG/ML
INJECTION, SOLUTION INFILTRATION; PERINEURAL AS NEEDED
Status: DISCONTINUED | OUTPATIENT
Start: 2023-11-20 | End: 2023-11-20 | Stop reason: SURG

## 2023-11-20 RX ORDER — ATENOLOL 50 MG/1
50 TABLET ORAL DAILY
Status: DISCONTINUED | OUTPATIENT
Start: 2023-11-21 | End: 2023-11-27 | Stop reason: HOSPADM

## 2023-11-20 RX ORDER — FAMOTIDINE 20 MG/1
40 TABLET, FILM COATED ORAL DAILY
Status: DISCONTINUED | OUTPATIENT
Start: 2023-11-21 | End: 2023-11-27 | Stop reason: HOSPADM

## 2023-11-20 RX ADMIN — BUPIVACAINE HYDROCHLORIDE 30 ML: 2.5 INJECTION, SOLUTION EPIDURAL; INFILTRATION; INTRACAUDAL; PERINEURAL at 15:57

## 2023-11-20 RX ADMIN — EPHEDRINE SULFATE 10 MG: 50 INJECTION INTRAVENOUS at 16:26

## 2023-11-20 RX ADMIN — DEXAMETHASONE SODIUM PHOSPHATE 4 MG: 10 INJECTION, SOLUTION INTRAMUSCULAR; INTRAVENOUS at 15:57

## 2023-11-20 RX ADMIN — HYDROMORPHONE HYDROCHLORIDE 0.5 MG: 1 INJECTION, SOLUTION INTRAMUSCULAR; INTRAVENOUS; SUBCUTANEOUS at 22:31

## 2023-11-20 RX ADMIN — OXYCODONE HYDROCHLORIDE AND ACETAMINOPHEN 1 TABLET: 5; 325 TABLET ORAL at 13:58

## 2023-11-20 RX ADMIN — Medication 10 ML: at 21:26

## 2023-11-20 RX ADMIN — PROPOFOL 200 MG: 10 INJECTION, EMULSION INTRAVENOUS at 15:53

## 2023-11-20 RX ADMIN — ROCURONIUM BROMIDE 100 MG: 10 SOLUTION INTRAVENOUS at 15:53

## 2023-11-20 RX ADMIN — SODIUM CHLORIDE 500 ML: 9 INJECTION, SOLUTION INTRAVENOUS at 13:53

## 2023-11-20 RX ADMIN — SUGAMMADEX 200 MG: 100 INJECTION, SOLUTION INTRAVENOUS at 18:33

## 2023-11-20 RX ADMIN — ROCURONIUM BROMIDE 10 MG: 10 SOLUTION INTRAVENOUS at 16:48

## 2023-11-20 RX ADMIN — FENTANYL CITRATE 50 MCG: 50 INJECTION, SOLUTION INTRAMUSCULAR; INTRAVENOUS at 19:28

## 2023-11-20 RX ADMIN — HYDROMORPHONE HYDROCHLORIDE 0.5 MG: 1 INJECTION, SOLUTION INTRAMUSCULAR; INTRAVENOUS; SUBCUTANEOUS at 19:08

## 2023-11-20 RX ADMIN — ONDANSETRON 4 MG: 2 INJECTION INTRAMUSCULAR; INTRAVENOUS at 13:58

## 2023-11-20 RX ADMIN — FENTANYL CITRATE 100 MCG: 50 INJECTION, SOLUTION INTRAMUSCULAR; INTRAVENOUS at 15:53

## 2023-11-20 RX ADMIN — FENTANYL CITRATE 50 MCG: 50 INJECTION, SOLUTION INTRAMUSCULAR; INTRAVENOUS at 19:04

## 2023-11-20 RX ADMIN — PIPERACILLIN SODIUM AND TAZOBACTAM SODIUM 3.38 G: 3; .375 INJECTION, SOLUTION INTRAVENOUS at 15:55

## 2023-11-20 RX ADMIN — HYDROMORPHONE HYDROCHLORIDE 0.5 MG: 1 INJECTION, SOLUTION INTRAMUSCULAR; INTRAVENOUS; SUBCUTANEOUS at 19:20

## 2023-11-20 RX ADMIN — FENTANYL CITRATE 50 MCG: 50 INJECTION, SOLUTION INTRAMUSCULAR; INTRAVENOUS at 19:14

## 2023-11-20 RX ADMIN — FAMOTIDINE 20 MG: 20 TABLET ORAL at 15:41

## 2023-11-20 RX ADMIN — SENNOSIDES AND DOCUSATE SODIUM 2 TABLET: 8.6; 5 TABLET ORAL at 21:25

## 2023-11-20 RX ADMIN — SODIUM CHLORIDE, POTASSIUM CHLORIDE, SODIUM LACTATE AND CALCIUM CHLORIDE 9 ML/HR: 600; 310; 30; 20 INJECTION, SOLUTION INTRAVENOUS at 15:29

## 2023-11-20 RX ADMIN — IOPAMIDOL 85 ML: 755 INJECTION, SOLUTION INTRAVENOUS at 13:46

## 2023-11-20 RX ADMIN — FENTANYL CITRATE 50 MCG: 50 INJECTION, SOLUTION INTRAMUSCULAR; INTRAVENOUS at 18:58

## 2023-11-20 RX ADMIN — SODIUM CHLORIDE 100 ML/HR: 9 INJECTION, SOLUTION INTRAVENOUS at 21:27

## 2023-11-20 RX ADMIN — LIDOCAINE HYDROCHLORIDE 50 MG: 10 INJECTION, SOLUTION INFILTRATION; PERINEURAL at 15:53

## 2023-11-20 RX ADMIN — ROCURONIUM BROMIDE 10 MG: 10 SOLUTION INTRAVENOUS at 17:28

## 2023-11-20 RX ADMIN — DEXAMETHASONE SODIUM PHOSPHATE 6 MG: 10 INJECTION INTRAMUSCULAR; INTRAVENOUS at 16:07

## 2023-11-20 RX ADMIN — ERTAPENEM 1000 MG: 1 INJECTION INTRAMUSCULAR; INTRAVENOUS at 21:26

## 2023-11-20 RX ADMIN — HYDROMORPHONE HYDROCHLORIDE 0.5 MG: 1 INJECTION, SOLUTION INTRAMUSCULAR; INTRAVENOUS; SUBCUTANEOUS at 18:52

## 2023-11-20 NOTE — ANESTHESIA PREPROCEDURE EVALUATION
Anesthesia Evaluation     Patient summary reviewed and Nursing notes reviewed   no history of anesthetic complications:   NPO Solid Status: > 8 hours  NPO Liquid Status: > 2 hours           Airway   Mallampati: II  TM distance: >3 FB  Neck ROM: full  No difficulty expected  Dental - normal exam     Pulmonary - normal exam   (-) not a smoker  Cardiovascular - normal exam  Exercise tolerance: good (4-7 METS)    ECG reviewed  Patient on routine beta blocker and Beta blocker given within 24 hours of surgery    (+) hypertension  (-) dysrhythmias, angina, CHF, cardiac stents      Neuro/Psych  (-) seizures, CVA  GI/Hepatic/Renal/Endo    (+) GERD    Musculoskeletal     Abdominal    Substance History - negative use     OB/GYN          Other        ROS/Med Hx Other: Hgb 14 k 3.6   Acute Sigmoid Diverticulitis with perforation & Pneumoperitoneum                       Anesthesia Plan    ASA 2 - emergent     general with block     (Risks and benefits of general anesthesia discussed with patient (including MI, CVA, death, recall, aspiration, oropharyngeal/dental damage), questions answered, agreeable to proceed.  Benefits and risks of nerve block/catheter discussed with patient ((including failed block, damage to nerve/nearby structures, intravascular injection)); questions answered, agreeable to proceed.    )  intravenous induction     Anesthetic plan, risks, benefits, and alternatives have been provided, discussed and informed consent has been obtained with: patient.    Use of blood products discussed with patient  Consented to blood products.    Plan discussed with CRNA.        CODE STATUS:

## 2023-11-20 NOTE — OP NOTE
General Surgery Operative Note    Lobo Yu  [id number]  1965    Date of Surgery:  11/20/2023 18:35 EST    Pre-op Diagnosis: Perforated sigmoid diverticulitis    Post-op Diagnosis: Perforated sigmoid diverticulitis              Intra-abdominal abscess    Procedure: Exploratory laparotomy           Sigmoid colectomy with colostomy creation            Intra-abdominal abscess drainage            15 Albanian Livan drain, pelvis             Incidental appendectomy    Surgeon: Hitesh Vallecillo MD    Circulator: Lobo Lobato RN  Scrub Person: Hilary Mosher  Assistant: Foreign Cadet PA     Assistant: Foreign Cadet PA  was responsible for performing the following activities: Retraction, Suction, Irrigation, Suturing, Closing, and Placing Dressing and their skilled assistance was necessary for the success of this case.    Anesthesia: General with bilateral tap blocks    Fluids: 1100 mL of crystalloid    Estimated Blood Loss: Less than 100 mL    Urine Voided: 600 mL    Specimens: Sigmoid colon & appendix                  Complications: None apparent    History:   58-year-old gentleman presented to the emergency room with acute worsening of abdominal pain.  His imaging demonstrated free intra-abdominal air with a likely colonic etiology..      The risks and benefits of exploratory laparotomy, bowel resection possible, colostomy creation were rehashed.  Our discussion included but was not limited to: bleeding, infection, injury to adjacent viscera (ureters, colon, small bowel, spleen etc.), chronic pain, incisional hernia formation, need for reintervention, and medical issues from a cardiopulmonary and deep venous thrombosis standpoint.  All questions were answered and they understood and wished to proceed with surgical intervention.    Procedure:      After informed consent, the patient was taken to the operating room and placed in the supine position.  Appropriate antibiotic prophylaxis was  given to the patient. General anesthesia was induced, bilateral TAP blocks were placed by anesthesia, a Pitt catheter was placed, the abdomen was then prepped and draped in the standard sterile fashion. A time out was observed.     A midline incision was created in the standard fashion.  The peritoneum was entered sharply, upon initial inspection it did appear that the majority of the pathology was in the patient's pelvis.  I extended the fascial incision down to the level of the pubis.  There appeared to be a significant inflammatory reaction involving the sigmoid colon and surrounding small bowel.  Finger fracture dissection freed the small bowel, open to the intra-abdominal abscess and  the sigmoid colon from the pelvis and sidewall.  This did have the appearance of a perforated sigmoid diverticulitis.  At this point I created a window in the mesentery at the level of the sacral promontory.  This was extended down toward the pelvis.  I did identify the ureter on both the right and left hand side.  A window in the mesoappendix was created and a green load with the contour stapler was taken across the proximal rectum.  The [place] LigaSure was used to take the sigmoid mesentery back to healthy mesentery proximal to the inflammatory mass.  The sigmoid was then transected again with the green contour stapler.  This was passed off the specimen.  Meticulous hemostasis was obtained.  The rectal stump was marked with 2 Prolene suture.  The appendix extended down into the proximal portion of the resection thus I felt that removing the appendix was appropriate.  A window in the mesoappendix was created with a right angle.  A 55 ROSA stapler was taken across the appendiceal base and the mesoappendix was taken with the [place] LigaSure.  The small bowel was run from the completely lysing inflammatory adhesions and ensuring the small bowel was in good order with no retained fluid collections.  I then mobilized the  sigmoid colon along the white line of Toldt.  Adequate mobility was obtained for colostomy creation.   The nasogastric tube was noted to be in the gastric lumen.  The abdomen was then copiously irrigated with warm saline.  A left sided colostomy was created in the standard fashion through the rectus muscle.  There was adequate length for colostomy creation and the colon was brought through the abdominal wall in standard form.  A 15 Mongolian Livan drain was placed into the pelvis bringing this out a right upper quadrant separate stab incision.  This was sewn into place with a 2-0 nylon.  The omentum was placed over the small bowel down into the pelvis.  The midline fascial defect was closed with 2 #1 looped PDS.  The skin was reapproximated with staples loosely.  Betadine soaked Telfa jenifer were placed to the interstices of the staples.  The colostomy was matured with 3-0 chromic suture.  A colostomy bag was placed over the left-sided stoma.  And the drain was placed to bulb suction.     All lap and needle counts were reported as correct at the end of the procedure ×2.  The patient was then transferred to the PACU.    Hitesh Vallecillo MD     Date: 11/20/2023  Time: 18:35 EST

## 2023-11-20 NOTE — CONSULTS
General Surgery Consultation Note    Date of Service: 11/20/2023  Lobo Yu  [id number]  1965      Referring Provider: Eric Deshpande MD    Location of Consult: ER     Reason for Consultation: Free intra-abdominal air, hollow viscus perforation    History of Present Illness:  I am seeing, Lobo Yu, in consultation for Eric Deshpande MD regarding free air.  58-year-old gentleman presents with the acute worsening of abdominal pain in the mid to low abdomen.  He underwent an elective colonoscopy at the beginning of this month and began having abdominal pain afterward.  He did undergo polypectomy at that point.  He touched base with Dr. Avalos whom prescribed antibiotics.  He subsequently traveled to [place] and developed worsening abdominal pain and diarrhea.  He has also had low-grade fever.  With worsening abdominal pain he presented to the emergency room today.  His work-up included a CT scan of the abdomen and pelvis which demonstrated free intra-abdominal air, likely source the sigmoid colon.  Outside of low-grade fever and abdominal pain he denies headache, nausea, vomiting, jaundice, acholic stool, or tea colored urine.  He has had no symptoms similar to this in the past.    Past Medical History:   Diagnosis Date    GERD (gastroesophageal reflux disease)     Hypertension        Past Surgical History:    NEPHROLITHOTOMY    TONSILLECTOMY       Allergies   Allergen Reactions    Amoxicillin GI Intolerance       No current facility-administered medications on file prior to encounter.     Current Outpatient Medications on File Prior to Encounter   Medication Sig Dispense Refill    aspirin 81 MG EC tablet Take 1 tablet by mouth Daily. OTC      atenolol (TENORMIN) 50 MG tablet Take 1 tablet by mouth Daily.      ciprofloxacin (Cipro) 500 MG tablet Take 1 tablet by mouth 2 (Two) Times a Day. (Patient taking differently: Take 1 tablet by mouth 2 (Two) Times a Day. For 10 days, started on  11-17-23) 20 tablet 0    dicyclomine (BENTYL) 10 MG capsule Take 2 capsules by mouth 3 (Three) Times a Day As Needed for Abdominal Cramping. 180 capsule 3    famotidine (PEPCID) 40 MG tablet Take 1 tablet by mouth Daily.      fluticasone (FLONASE) 50 MCG/ACT nasal spray 2 sprays by Each Nare route Daily As Needed for Allergies or Rhinitis. OTC      metroNIDAZOLE (FLAGYL) 500 MG tablet Take 1 tablet by mouth 3 (Three) Times a Day. (Patient taking differently: Take 1 tablet by mouth 3 (Three) Times a Day. For 10 days, started on 11-17-23) 30 tablet 0    omeprazole (priLOSEC) 40 MG capsule Take 1 capsule by mouth 2 (Two) Times a Week.      sildenafil (REVATIO) 20 MG tablet Take 3 tablets by mouth Daily As Needed.      triamterene-hydrochlorothiazide (DYAZIDE) 37.5-25 MG per capsule Take 1 capsule by mouth Daily.      [DISCONTINUED] ciprofloxacin (CIPRO) 500 MG tablet Take 1 tablet by mouth 2 (Two) Times a Day. 20 tablet 0    [DISCONTINUED] HYDROcodone-acetaminophen (NORCO) 7.5-325 MG per tablet Take 1 tablet by mouth Every 6 (Six) Hours As Needed for Severe Pain. 12 tablet 0    [DISCONTINUED] metroNIDAZOLE (FLAGYL) 500 MG tablet Take 1 tablet by mouth 2 (Two) Times a Day. 14 tablet 0    [DISCONTINUED] metroNIDAZOLE (FLAGYL) 500 MG tablet Take 1 tablet by mouth 3 (Three) Times a Day. 30 tablet 0    [DISCONTINUED] ondansetron ODT (ZOFRAN-ODT) 4 MG disintegrating tablet Place 1 tablet on the tongue Every 6 (Six) Hours As Needed for Nausea or Vomiting. 20 tablet 0    [DISCONTINUED] Sod Picosulfate-Mag Ox-Cit Acd (Clenpiq) 10-3.5-12 MG-GM -GM/160ML solution Take 350 mL by mouth Take As Directed. 350 mL 0         Current Facility-Administered Medications:     piperacillin-tazobactam (ZOSYN) 3.375 g in iso-osmotic dextrose 50 ml (premix), 3.375 g, Intravenous, Once, Eric Deshpande MD    Sodium Chloride (PF) 0.9 % 10 mL, 10 mL, Intravenous, PRN, Eric Deshpande MD    [COMPLETED] Insert Peripheral IV, , , Once  **AND** sodium chloride 0.9 % flush 10 mL, 10 mL, Intravenous, PRN, Charlee, Eric Jarvis MD    Current Outpatient Medications:     aspirin 81 MG EC tablet, Take 1 tablet by mouth Daily. OTC, Disp: , Rfl:     atenolol (TENORMIN) 50 MG tablet, Take 1 tablet by mouth Daily., Disp: , Rfl:     ciprofloxacin (Cipro) 500 MG tablet, Take 1 tablet by mouth 2 (Two) Times a Day. (Patient taking differently: Take 1 tablet by mouth 2 (Two) Times a Day. For 10 days, started on 11-17-23), Disp: 20 tablet, Rfl: 0    dicyclomine (BENTYL) 10 MG capsule, Take 2 capsules by mouth 3 (Three) Times a Day As Needed for Abdominal Cramping., Disp: 180 capsule, Rfl: 3    famotidine (PEPCID) 40 MG tablet, Take 1 tablet by mouth Daily., Disp: , Rfl:     fluticasone (FLONASE) 50 MCG/ACT nasal spray, 2 sprays by Each Nare route Daily As Needed for Allergies or Rhinitis. OTC, Disp: , Rfl:     metroNIDAZOLE (FLAGYL) 500 MG tablet, Take 1 tablet by mouth 3 (Three) Times a Day. (Patient taking differently: Take 1 tablet by mouth 3 (Three) Times a Day. For 10 days, started on 11-17-23), Disp: 30 tablet, Rfl: 0    omeprazole (priLOSEC) 40 MG capsule, Take 1 capsule by mouth 2 (Two) Times a Week., Disp: , Rfl:     sildenafil (REVATIO) 20 MG tablet, Take 3 tablets by mouth Daily As Needed., Disp: , Rfl:     triamterene-hydrochlorothiazide (DYAZIDE) 37.5-25 MG per capsule, Take 1 capsule by mouth Daily., Disp: , Rfl:     History reviewed. No pertinent family history.  Social History     Socioeconomic History    Marital status:    Tobacco Use    Smoking status: Former     Types: Cigarettes       Review of Systems:  Review of Systems   Constitutional:  Positive for activity change and fever. Negative for appetite change and chills.   HENT:  Negative for congestion, ear pain and postnasal drip.    Eyes:  Negative for photophobia and visual disturbance.   Respiratory:  Negative for apnea, cough and wheezing.    Cardiovascular:  Negative for chest pain  "and leg swelling.   Gastrointestinal:  Positive for abdominal pain and diarrhea. Negative for abdominal distention, nausea and vomiting.   Endocrine: Negative for polyphagia and polyuria.   Genitourinary:  Negative for dysuria, frequency and urgency.   Musculoskeletal:  Negative for back pain, joint swelling and neck pain.   Skin:  Negative for pallor and rash.   Allergic/Immunologic: Negative for immunocompromised state.   Neurological:  Negative for dizziness, tremors, seizures and headaches.   Hematological:  Negative for adenopathy.   Psychiatric/Behavioral:  Negative for agitation, hallucinations and self-injury.      Otherwise the 12 point review of systems is negative.    BP (!) 184/99 (BP Location: Left arm, Patient Position: Sitting)   Pulse 54   Temp 98.3 °F (36.8 °C) (Oral)   Resp 17   Ht 180.3 cm (71\")   Wt 88.5 kg (195 lb)   SpO2 99%   BMI 27.20 kg/m²   Body mass index is 27.2 kg/m².    General: Pleasantly conversant, mild distress   HEENT: PER, no icterus, normal sclerae  Cardiac: regular rhythm,  no audible rubs  Pulmonary: bilateral breath sounds, nonlabored  Abdominal: Soft, guarding, peritonitis  Neurologic: awake, alert, no obvious focal deficits  Extremities: warm, no edema  Skin: no obvious rashes nor worrisome lesions seen     CBC  Results from last 7 days   Lab Units 11/20/23  1145   WBC 10*3/mm3 10.11   HEMOGLOBIN g/dL 14.0   HEMATOCRIT % 41.5   PLATELETS 10*3/mm3 285       CMP  Results from last 7 days   Lab Units 11/20/23  1145   SODIUM mmol/L 140   POTASSIUM mmol/L 3.6   CHLORIDE mmol/L 102   CO2 mmol/L 27.0   BUN mg/dL 10   CREATININE mg/dL 0.99   CALCIUM mg/dL 9.1   BILIRUBIN mg/dL 0.8   ALK PHOS U/L 71   ALT (SGPT) U/L 19   AST (SGOT) U/L 16   GLUCOSE mg/dL 122*       Radiology  Imaging Results (Last 72 Hours)       Procedure Component Value Units Date/Time    CT Abdomen Pelvis With Contrast [[id number]] Collected: 11/20/23 1348     Updated: 11/20/23 1409    Narrative:      CT " ABDOMEN PELVIS W CONTRAST    Date of Exam: 11/20/2023 1:30 PM EST    Indication: LLQ pain.    Comparison: CT abdomen pelvis 11/25/2022    Technique: Axial CT images were obtained of the abdomen and pelvis following the uneventful intravenous administration of 85 mL Isovue-300. Reconstructed coronal and sagittal images were also obtained. Automated exposure control and iterative   construction methods were used.    Findings:    Lower thorax: Lung bases are clear.    Liver: No focal hepatic lesion. Suspected hepatic steatosis.    Gallbladder and bile ducts: Gallbladder is unremarkable. No biliary ductal dilatation.    Pancreas: No pancreatic duct dilation. No surrounding inflammation.    Spleen: Normal in size. Calcified granulomata.    Adrenal glands: No discrete adrenal nodule.    Kidneys: No hydronephrosis. Right renal cysts. Normal, symmetric excretion of contrast on delayed phase.    Urinary bladder: Unremarkable.    Reproductive organs: Prostatomegaly.    Stomach and bowel: Extensive fat stranding and free gas surrounding sigmoid diverticular disease with regional wall thickening. Secondary enteritis involving adjacent small bowel loops. No evidence of bowel obstruction. No evidence of appendicitis. Small   hiatal hernia.    Lymph nodes: No pathologically enlarged lymph nodes.    Vessels: No abdominal aortic aneurysm. Atherosclerosis.    Peritoneum and retroperitoneum: Pneumoperitoneum, some of which is distant from the perforated sigmoid diverticulitis, including antidependent gas in the anterior peritoneum. No drainable fluid collection at this time.    Soft tissues: Tiny fat-containing umbilical hernia.    Osseous structures: No suspicious osseous lesions.      Impression:      Impression:    Acute sigmoid diverticulitis with renato perforation, with some pneumoperitoneum noted to be distant from the sigmoid/source. There is extensive surrounding inflammatory change without drainable fluid collection at this  time. Recommend surgery consult.    Electronically Signed: Asher Garcia MD    11/20/2023 2:06 PM EST    Workstation ID: [id number]              Assessment:  Sigmoid diverticulitis with perforation  Hypertension  History of pulmonary hypertension    Plan:  I reviewed his CT scan of the abdomen and pelvis which demonstrates free intra-abdominal air and significant sigmoid inflammation.  He appears reasonably well and it is possible that this area has sealed.  With the amount of free air I think the best option for this gentleman is a exploratory laparotomy, abscess drainage/washout, and possible bowel resection with stoma creation.  I discussed the associated risks and benefits associated with surgical intervention including, but not limited to: bleeding, infection, injury to adjacent viscera (colon, small bowel, liver, spleen, ureters etc.), postoperative abscess, need for reintervention, need for percutaneous drainage, and medical issues from a cardiopulmonary and deep venous thrombosis standpoint.  All his questions were answered.  He understands and wishes to proceed with surgical intervention.  With free air we will get him moving towards the operating room emergently.    Hitesh Vallecillo MD  11/20/23  14:57 EST

## 2023-11-20 NOTE — H&P
[facility] Medicine Services  HISTORY AND PHYSICAL    Patient Name: Lobo Yu  : 1965  MRN: [id number]  Primary Care Physician: Provider, No Known  Date of admission: 2023      Subjective   Subjective     Chief Complaint:  Abdominal pain    HPI:  Lobo Yu is a 58 y.o. male with PMHx HTN and colon polyps who presents with increased abdominal pain. Pt had a colonoscopy with Dr Avalos on  for follow-up for colon polyps from 5 years ago. He had 3 polyps removed. Also noted to have diverticulitis. Patient states he started having abdominal pain the day after colonoscopy. He notified his GI physician and was prescribed Cipro + Flagyl. States he felt improved and then went to [place] for a trip. While there, he felt worse and went to a clinic and was prescribed the same medications. States when he got back, he still felt bad. He was later given Bentyl. He has not had any fevers or chills. Denies nausea or vomiting. Has had soft stools but not diarrhea. In ED, CT imaging with acute sigmoid diverticulitis with renato perforation with pneumoperitoneum. General Surgery, Dr Vallecillo, evaluated in ED. Plan to take to OR today. Hospital medicine asked to admit.     Personal History     Past Medical History:   Diagnosis Date    GERD (gastroesophageal reflux disease)     Hypertension              Past Surgical History:   Procedure Laterality Date    NEPHROLITHOTOMY      TONSILLECTOMY         Family History: family history is not on file.     Social History:  reports that he has quit smoking. His smoking use included cigarettes. He does not have any smokeless tobacco history on file.  Social History     Social History Narrative    Not on file       Medications:  Available home medication information reviewed.  (Not in a hospital admission)      Allergies   Allergen Reactions    Amoxicillin GI Intolerance       Objective   Objective     Vital Signs:   Temp:  [98.3 °F (36.8  °C)] 98.3 °F (36.8 °C)  Heart Rate:  [54-74] 54  Resp:  [17] 17  BP: (184)/(99) 184/99       Physical Exam   Constitutional: Awake, alert, NAD, non-toxic, pleasant  Eyes: PERRLA, sclerae anicteric, no conjunctival injection  HENT: NCAT, mucous membranes moist  Neck: Supple, no thyromegaly, no lymphadenopathy, trachea midline  Respiratory: Clear to auscultation bilaterally, nonlabored respirations   Cardiovascular: RRR, no murmurs, rubs, or gallops, palpable pedal pulses bilaterally  Gastrointestinal: Positive bowel sounds, soft, TTP LLQ  Musculoskeletal: No bilateral ankle edema, no clubbing or cyanosis to extremities  Psychiatric: Appropriate affect, cooperative  Neurologic: Oriented x 3, strength symmetric in all extremities, Cranial Nerves grossly intact to confrontation, speech clear  Skin: No rashes     Result Review:  I have personally reviewed the results from the time of this admission to 11/20/2023 14:54 EST and agree with these findings:  [x]  Laboratory list / accordion  []  Microbiology  [x]  Radiology  []  EKG/Telemetry   []  Cardiology/Vascular   []  Pathology  [x]  Old records  []  Other:      LAB RESULTS:      Lab 11/20/23  1145   WBC 10.11   HEMOGLOBIN 14.0   HEMATOCRIT 41.5   PLATELETS 285   NEUTROS ABS 7.41*   IMMATURE GRANS (ABS) 0.06*   LYMPHS ABS 1.30   MONOS ABS 1.18*   EOS ABS 0.14   MCV 93.7   PROCALCITONIN 0.34*   LACTATE 1.0         Lab 11/20/23  1145   SODIUM 140   POTASSIUM 3.6   CHLORIDE 102   CO2 27.0   ANION GAP 11.0   BUN 10   CREATININE 0.99   EGFR 88.9   GLUCOSE 122*   CALCIUM 9.1         Lab 11/20/23  1145   TOTAL PROTEIN 7.4   ALBUMIN 4.0   GLOBULIN 3.4   ALT (SGPT) 19   AST (SGOT) 16   BILIRUBIN 0.8   ALK PHOS 71   LIPASE 49                     UA          11/20/2023    11:47   Urinalysis   Squamous Epithelial Cells, UA 0-2    Specific Gravity, UA 1.021    Ketones, UA Negative    Blood, UA Negative    Leukocytes, UA Small (1+)    Nitrite, UA Negative    RBC, UA 0-2    WBC, UA  0-2    Bacteria, UA None Seen        Microbiology Results (last 10 days)       ** No results found for the last 240 hours. **            CT Abdomen Pelvis With Contrast    Result Date: 11/20/2023  CT ABDOMEN PELVIS W CONTRAST Date of Exam: 11/20/2023 1:30 PM EST Indication: LLQ pain. Comparison: CT abdomen pelvis 11/25/2022 Technique: Axial CT images were obtained of the abdomen and pelvis following the uneventful intravenous administration of 85 mL Isovue-300. Reconstructed coronal and sagittal images were also obtained. Automated exposure control and iterative construction methods were used. Findings: Lower thorax: Lung bases are clear. Liver: No focal hepatic lesion. Suspected hepatic steatosis. Gallbladder and bile ducts: Gallbladder is unremarkable. No biliary ductal dilatation. Pancreas: No pancreatic duct dilation. No surrounding inflammation. Spleen: Normal in size. Calcified granulomata. Adrenal glands: No discrete adrenal nodule. Kidneys: No hydronephrosis. Right renal cysts. Normal, symmetric excretion of contrast on delayed phase. Urinary bladder: Unremarkable. Reproductive organs: Prostatomegaly. Stomach and bowel: Extensive fat stranding and free gas surrounding sigmoid diverticular disease with regional wall thickening. Secondary enteritis involving adjacent small bowel loops. No evidence of bowel obstruction. No evidence of appendicitis. Small  hiatal hernia. Lymph nodes: No pathologically enlarged lymph nodes. Vessels: No abdominal aortic aneurysm. Atherosclerosis. Peritoneum and retroperitoneum: Pneumoperitoneum, some of which is distant from the perforated sigmoid diverticulitis, including antidependent gas in the anterior peritoneum. No drainable fluid collection at this time. Soft tissues: Tiny fat-containing umbilical hernia. Osseous structures: No suspicious osseous lesions.     Impression: Impression: Acute sigmoid diverticulitis with renato perforation, with some pneumoperitoneum noted to be  distant from the sigmoid/source. There is extensive surrounding inflammatory change without drainable fluid collection at this time. Recommend surgery consult. Electronically Signed: Asher Garcai MD  11/20/2023 2:06 PM EST  Workstation ID: [id number]         Assessment & Plan   Assessment & Plan     Active Hospital Problems    Diagnosis  POA    Perforated diverticulum [K57.80]  Unknown    Essential hypertension [I10]  Unknown    Pulmonary hypertension [I27.20]  Unknown       Acute Sigmoid Diverticulitis with perforation & Pneumoperitoneum   Hx Diverticulosis   S/P Colonoscopy with polyp removal x3 11/6/23  -NPO  -to OR today with Dr Vallecillo, all risks/benefits/complications discussed at bedside  -IV ABX   -IVF  -Pain Control   -Blood Cx pending   -Consult to ID in AM     HTN  -Resume home meds when able to take PO  -PRN IV Labetalol     DVT prophylaxis:  Mechanical    CODE STATUS:  Full Code   There are no questions and answers to display.       Expected Discharge   Expected Discharge Date: 11/24/2023; Expected Discharge Time:      Grace Markham DO  11/20/23

## 2023-11-20 NOTE — ANESTHESIA PROCEDURE NOTES
Airway  Urgency: elective    Date/Time: 11/20/2023 3:55 PM  Airway not difficult    General Information and Staff    Patient location during procedure: OR  SRNA: Trenton Arana SRNA  Indications and Patient Condition  Indications for airway management: airway protection    Preoxygenated: yes  MILS not maintained throughout  Mask difficulty assessment: 2 - vent by mask + OA or adjuvant +/- NMBA    Final Airway Details  Final airway type: endotracheal airway      Successful airway: ETT  Cuffed: yes   Successful intubation technique: direct laryngoscopy  Facilitating devices/methods: Bougie, intubating stylet and cricoid pressure  Endotracheal tube insertion site: oral  Blade: Magaly  Blade size: 4  ETT size (mm): 7.5  Cormack-Lehane Classification: grade IIb - view of arytenoids or posterior of glottis only  Placement verified by: chest auscultation and capnometry   Measured from: lips  ETT/EBT  to lips (cm): 22  Number of attempts at approach: 2  Assessment: lips, teeth, and gum same as pre-op and atraumatic intubation    Additional Comments  Negative epigastric sounds, Breath sound equal bilaterally with symmetric chest rise and fall

## 2023-11-20 NOTE — ANESTHESIA POSTPROCEDURE EVALUATION
Patient: Lobo Yu    Procedure Summary       Date: 11/20/23 Room / Location:  [facility]  /  [facility]    Anesthesia Start: 1545 Anesthesia Stop: 1846    Procedure: LAPAROTOMY EXPLORATORY, COLON RESECTION, APPENDECTOMY, COLOSTOMY (Abdomen) Diagnosis:     Surgeons: Hitesh Vallecillo MD Provider: Eric Claire MD    Anesthesia Type: general with block ASA Status: 2 - Emergent            Anesthesia Type: general with block    Vitals  Vitals Value Taken Time   /95 11/20/23 2000   Temp 98.5 °F (36.9 °C) 11/20/23 2000   Pulse 74 11/20/23 2004   Resp 16 11/20/23 2000   SpO2 94 % 11/20/23 2004   Vitals shown include unfiled device data.        Post Anesthesia Care and Evaluation    Patient location during evaluation: PACU  Patient participation: complete - patient participated  Level of consciousness: awake and alert  Pain management: adequate    Airway patency: patent  Anesthetic complications: No anesthetic complications  PONV Status: none  Cardiovascular status: hemodynamically stable and acceptable  Respiratory status: nonlabored ventilation, acceptable and nasal cannula  Hydration status: acceptable    Comments: To PACU on O2NC, breathing comfortably.  Report to PACU RN at bedside. VSS.

## 2023-11-20 NOTE — Clinical Note
Level of Care: Telemetry [5]   Diagnosis: Perforated diverticulum [953876]   Admitting Physician: TEO THOMPSON [[id number]]

## 2023-11-20 NOTE — ANESTHESIA PROCEDURE NOTES
Peripheral Block    Pre-sedation assessment completed: 11/20/2023 3:08 PM    Patient reassessed immediately prior to procedure    Patient location during procedure: OR  Start time: 11/21/2023 3:57 PM  Reason for block: at surgeon's request and post-op pain management  Performed by  CRNA/RUY: Monroe Segura CRNASRNA: Trenton Arana SRNA  Preanesthetic Checklist  Completed: patient identified, IV checked, site marked, risks and benefits discussed, surgical consent, monitors and equipment checked, pre-op evaluation and timeout performed  Prep:  Pt Position: supine  Sterile barriers:cap, gloves, mask and washed/disinfected hands  Prep: ChloraPrep  Patient monitoring: blood pressure monitoring, continuous pulse oximetry and EKG  Procedure    Sedation: yes  Performed under: general  Guidance:ultrasound guided  Images:still images obtained, printed/placed on chart    Laterality:Bilateral  Block Type:TAP  Injection Technique:single-shot  Needle Type:short-bevel and echogenic  Needle Gauge:20 G  Resistance on Injection: none    Medications Used: dexamethasone sodium phosphate injection - Injection   4 mg - 11/20/2023 3:57:00 PM  bupivacaine PF (MARCAINE) 0.25 % injection - Injection   30 mL - 11/20/2023 3:57:00 PM      Medications  Preservative Free Saline:10ml  Comment:Block Injection:  LA dose divided between Right and Left block        Post Assessment  Injection Assessment: negative aspiration for heme, incremental injection and no paresthesia on injection  Patient Tolerance:comfortable throughout block  Complications:no  Additional Notes    Subcostal TAPs    A high-frequency linear transducer, with sterile cover, was placed sub-xiphoid to identify Line[place] Alba, right and left Rectus Abdominus Muscles (RK). The transducer was moved either right or left subcostally to identify the RK and the Transverse Abdominus Muscle (BRAR). The insertion site was prepped in sterile fashion and then localized with 2-5 ml of 1%  "Lidocaine. Using ultrasound-guidance, a 20-gauge B-Monge 4\" Ultraplex 360 non-stimulating echogenic needle was advanced in plane, from medial to lateral, until the tip of the needle was in the fascial plane between the RK and BRAR. 1-3ml of preservative free normal saline was used to hydro-dissect the fascial planes. After the fascial plane was verified, the local anesthetic (LA) was injected. The procedure was repeated on the opposite side for bilateral coverage. Aspiration every 5 ml to prevent intravascular injection. Injection was completed with negative aspiration of blood and negative intravascular injection. Injection pressures were normal with minimal resistance. The subcostal approach to the TAP nerve block ideally anesthetizes the intercostal nerves T6-T9.     Mid-Axillary/Lateral TAPs    A high-frequency linear transducer, with sterile cover, was placed in the midaxillary line between the ASIS and costal margin. The External Oblique Muscle (EOM), Internal Oblique Muscle (IOM), Transverse Abdominus Muscle (BRAR), and Peritoneum were identified. The insertion site was prepped in sterile fashion and then localized with 2-5 ml of 1% Lidocaine. Using ultrasound-guidance, a 20-gauge B-Monge 4\" Ultraplex 360 non-stimulating echogenic needle was advanced in plane, from medial to lateral, until the tip of the needle was in the fascial plane between the IOM and BRAR. 1-3ml of preservative free normal saline was used to hydro-dissect the fascial planes. After the fascial plane was verified, the local anesthetic (LA) was injected. The procedure was repeated on the opposite side for bilateral coverage. Aspiration every 5 ml to prevent intravascular injection. Injection was completed with negative aspiration of blood and negative intravascular injection. Injection pressures were normal with minimal resistance. Midaxillary TAPs should reach intercostal nerves T10- T11 and the subcostal nerve T12.              "

## 2023-11-20 NOTE — CASE MANAGEMENT/SOCIAL WORK
Discharge Planning Assessment  [facility]     Patient Name: Lobo Yu  MRN: [id number]  Today's Date: 11/20/2023    Admit Date: 11/20/2023    Plan: Home   Discharge Needs Assessment       Row Name 11/20/23 1443       Living Environment    People in Home spouse    Current Living Arrangements home    Primary Care Provided by self    Provides Primary Care For no one    Family Caregiver if Needed spouse    Family Caregiver Names Argenis Yu, wife    Quality of Family Relationships helpful;involved;supportive    Able to Return to Prior Arrangements yes       Transition Planning    Patient/Family Anticipates Transition to home with family    Patient/Family Anticipated Services at Transition        Discharge Needs Assessment    Equipment Currently Used at Home cpap    Concerns to be Addressed denies needs/concerns at this time    Discharge Coordination/Progress Lives in a house in [place] with wife, independent at baseline.  Has a cpap that he occasionally uses.  Has not had home health in the past, and does not have an advanced directive.                   Discharge Plan       Row Name 11/20/23 1444       Plan    Plan Home    Patient/Family in Agreement with Plan yes    Plan Comments I spoke with Mr Yu at bedside.  Mr Yu resides in a house in [place] with his wife and is independent of ADLs at baseline.  He has a cpap at home that he uses.  He has not had home health, and does not have an advanced directive.  His insurance is [employer], and he denies any issues or lapses in coverage.  His PCP is Dr. Loc Morrell, and he gets his prescriptions filled at [facility] off of [address].  At this time, there are no discharge needs.    Final Discharge Disposition Code 01 - home or self-care                  Continued Care and Services - Admitted Since 11/20/2023    Coordination has not been started for this encounter.          Demographic Summary    No documentation.                   Functional Status       Row Name 11/20/23 1443       Functional Status    Usual Activity Tolerance good    Current Activity Tolerance good       Functional Status, IADL    Medications independent    Meal Preparation independent    Housekeeping independent    Laundry independent    Shopping independent       Mental Status    General Appearance WDL WDL                   Psychosocial    No documentation.                  Abuse/Neglect    No documentation.                  Legal    No documentation.                  Substance Abuse    No documentation.                  Patient Forms    No documentation.                     Vivian Fountain RN

## 2023-11-21 ENCOUNTER — DOCUMENTATION (OUTPATIENT)
Dept: HOME HEALTH SERVICES | Facility: HOME HEALTHCARE | Age: 58
End: 2023-11-21
Payer: COMMERCIAL

## 2023-11-21 LAB
ALBUMIN SERPL-MCNC: 3.6 G/DL (ref 3.5–5.2)
ALBUMIN/GLOB SERPL: 1.4 G/DL
ALP SERPL-CCNC: 47 U/L (ref 39–117)
ALT SERPL W P-5'-P-CCNC: 18 U/L (ref 1–41)
ANION GAP SERPL CALCULATED.3IONS-SCNC: 11 MMOL/L (ref 5–15)
AST SERPL-CCNC: 20 U/L (ref 1–40)
BASOPHILS # BLD AUTO: 0.01 10*3/MM3 (ref 0–0.2)
BASOPHILS NFR BLD AUTO: 0.1 % (ref 0–1.5)
BILIRUB SERPL-MCNC: 0.6 MG/DL (ref 0–1.2)
BUN SERPL-MCNC: 10 MG/DL (ref 6–20)
BUN/CREAT SERPL: 11.2 (ref 7–25)
CALCIUM SPEC-SCNC: 8.5 MG/DL (ref 8.6–10.5)
CHLORIDE SERPL-SCNC: 101 MMOL/L (ref 98–107)
CO2 SERPL-SCNC: 28 MMOL/L (ref 22–29)
CREAT SERPL-MCNC: 0.89 MG/DL (ref 0.76–1.27)
DEPRECATED RDW RBC AUTO: 43.8 FL (ref 37–54)
EGFRCR SERPLBLD CKD-EPI 2021: 100 ML/MIN/1.73
EOSINOPHIL # BLD AUTO: 0 10*3/MM3 (ref 0–0.4)
EOSINOPHIL NFR BLD AUTO: 0 % (ref 0.3–6.2)
ERYTHROCYTE [DISTWIDTH] IN BLOOD BY AUTOMATED COUNT: 12.8 % (ref 12.3–15.4)
GLOBULIN UR ELPH-MCNC: 2.5 GM/DL
GLUCOSE SERPL-MCNC: 133 MG/DL (ref 65–99)
HCT VFR BLD AUTO: 38.7 % (ref 37.5–51)
HGB BLD-MCNC: 13 G/DL (ref 13–17.7)
IMM GRANULOCYTES # BLD AUTO: 0.07 10*3/MM3 (ref 0–0.05)
IMM GRANULOCYTES NFR BLD AUTO: 0.6 % (ref 0–0.5)
LYMPHOCYTES # BLD AUTO: 0.68 10*3/MM3 (ref 0.7–3.1)
LYMPHOCYTES NFR BLD AUTO: 5.4 % (ref 19.6–45.3)
MCH RBC QN AUTO: 31.2 PG (ref 26.6–33)
MCHC RBC AUTO-ENTMCNC: 33.6 G/DL (ref 31.5–35.7)
MCV RBC AUTO: 92.8 FL (ref 79–97)
MONOCYTES # BLD AUTO: 1.24 10*3/MM3 (ref 0.1–0.9)
MONOCYTES NFR BLD AUTO: 9.8 % (ref 5–12)
NEUTROPHILS NFR BLD AUTO: 10.63 10*3/MM3 (ref 1.7–7)
NEUTROPHILS NFR BLD AUTO: 84.1 % (ref 42.7–76)
NRBC BLD AUTO-RTO: 0 /100 WBC (ref 0–0.2)
PLATELET # BLD AUTO: 293 10*3/MM3 (ref 140–450)
PMV BLD AUTO: 9.3 FL (ref 6–12)
POTASSIUM SERPL-SCNC: 4.3 MMOL/L (ref 3.5–5.2)
PROT SERPL-MCNC: 6.1 G/DL (ref 6–8.5)
RBC # BLD AUTO: 4.17 10*6/MM3 (ref 4.14–5.8)
SODIUM SERPL-SCNC: 140 MMOL/L (ref 136–145)
WBC NRBC COR # BLD AUTO: 12.63 10*3/MM3 (ref 3.4–10.8)

## 2023-11-21 PROCEDURE — 25010000002 ONDANSETRON PER 1 MG: Performed by: FAMILY MEDICINE

## 2023-11-21 PROCEDURE — 25810000003 SODIUM CHLORIDE 0.9 % SOLUTION: Performed by: FAMILY MEDICINE

## 2023-11-21 PROCEDURE — 25010000002 HYDROMORPHONE PER 4 MG: Performed by: HOSPITALIST

## 2023-11-21 PROCEDURE — 25810000003 SODIUM CHLORIDE 0.9 % SOLUTION: Performed by: SURGERY

## 2023-11-21 PROCEDURE — 25010000002 HEPARIN (PORCINE) PER 1000 UNITS: Performed by: SURGERY

## 2023-11-21 PROCEDURE — 25010000002 HYDROMORPHONE 1 MG/ML SOLUTION: Performed by: HOSPITALIST

## 2023-11-21 PROCEDURE — 25010000002 MORPHINE PER 10 MG: Performed by: FAMILY MEDICINE

## 2023-11-21 PROCEDURE — 25010000002 HYDROMORPHONE PER 4 MG: Performed by: FAMILY MEDICINE

## 2023-11-21 PROCEDURE — 85025 COMPLETE CBC W/AUTO DIFF WBC: CPT | Performed by: FAMILY MEDICINE

## 2023-11-21 PROCEDURE — 99233 SBSQ HOSP IP/OBS HIGH 50: CPT | Performed by: HOSPITALIST

## 2023-11-21 PROCEDURE — 80053 COMPREHEN METABOLIC PANEL: CPT | Performed by: FAMILY MEDICINE

## 2023-11-21 PROCEDURE — 25010000002 FLUCONAZOLE PER 200 MG: Performed by: INTERNAL MEDICINE

## 2023-11-21 RX ORDER — HEPARIN SODIUM 5000 [USP'U]/ML
5000 INJECTION, SOLUTION INTRAVENOUS; SUBCUTANEOUS EVERY 8 HOURS SCHEDULED
Status: DISCONTINUED | OUTPATIENT
Start: 2023-11-21 | End: 2023-11-27 | Stop reason: HOSPADM

## 2023-11-21 RX ORDER — NALOXONE HCL 0.4 MG/ML
0.4 VIAL (ML) INJECTION
Status: DISCONTINUED | OUTPATIENT
Start: 2023-11-21 | End: 2023-11-21 | Stop reason: SDUPTHER

## 2023-11-21 RX ORDER — SODIUM CHLORIDE 9 MG/ML
30 INJECTION, SOLUTION INTRAVENOUS CONTINUOUS PRN
Status: DISCONTINUED | OUTPATIENT
Start: 2023-11-21 | End: 2023-11-27 | Stop reason: HOSPADM

## 2023-11-21 RX ORDER — NALOXONE HCL 0.4 MG/ML
0.4 VIAL (ML) INJECTION
Status: DISCONTINUED | OUTPATIENT
Start: 2023-11-21 | End: 2023-11-21

## 2023-11-21 RX ORDER — TRIAMTERENE AND HYDROCHLOROTHIAZIDE 37.5; 25 MG/1; MG/1
1 TABLET ORAL DAILY
Status: DISCONTINUED | OUTPATIENT
Start: 2023-11-21 | End: 2023-11-27 | Stop reason: HOSPADM

## 2023-11-21 RX ORDER — NALOXONE HCL 0.4 MG/ML
0.1 VIAL (ML) INJECTION
Status: DISCONTINUED | OUTPATIENT
Start: 2023-11-21 | End: 2023-11-27 | Stop reason: HOSPADM

## 2023-11-21 RX ORDER — HYDROMORPHONE HCL IN 0.9% NACL 10 MG/50ML
PATIENT CONTROLLED ANALGESIA SYRINGE INTRAVENOUS CONTINUOUS
Status: DISPENSED | OUTPATIENT
Start: 2023-11-21 | End: 2023-11-24

## 2023-11-21 RX ORDER — FLUCONAZOLE 2 MG/ML
400 INJECTION, SOLUTION INTRAVENOUS EVERY 24 HOURS
Qty: 1400 ML | Refills: 0 | Status: COMPLETED | OUTPATIENT
Start: 2023-11-21 | End: 2023-11-27

## 2023-11-21 RX ORDER — HYDROMORPHONE HYDROCHLORIDE 1 MG/ML
0.5 INJECTION, SOLUTION INTRAMUSCULAR; INTRAVENOUS; SUBCUTANEOUS
Status: DISCONTINUED | OUTPATIENT
Start: 2023-11-21 | End: 2023-11-21 | Stop reason: SDUPTHER

## 2023-11-21 RX ADMIN — SENNOSIDES AND DOCUSATE SODIUM 2 TABLET: 8.6; 5 TABLET ORAL at 08:34

## 2023-11-21 RX ADMIN — HEPARIN SODIUM 5000 UNITS: 5000 INJECTION INTRAVENOUS; SUBCUTANEOUS at 16:24

## 2023-11-21 RX ADMIN — HYDROMORPHONE HYDROCHLORIDE 0.5 MG: 1 INJECTION, SOLUTION INTRAMUSCULAR; INTRAVENOUS; SUBCUTANEOUS at 20:57

## 2023-11-21 RX ADMIN — Medication 10 ML: at 20:02

## 2023-11-21 RX ADMIN — ATENOLOL 50 MG: 50 TABLET ORAL at 08:34

## 2023-11-21 RX ADMIN — FAMOTIDINE 40 MG: 20 TABLET, FILM COATED ORAL at 08:34

## 2023-11-21 RX ADMIN — Medication: at 21:27

## 2023-11-21 RX ADMIN — HYDROMORPHONE HYDROCHLORIDE 0.5 MG: 1 INJECTION, SOLUTION INTRAMUSCULAR; INTRAVENOUS; SUBCUTANEOUS at 06:52

## 2023-11-21 RX ADMIN — MORPHINE SULFATE 2 MG: 2 INJECTION, SOLUTION INTRAMUSCULAR; INTRAVENOUS at 17:49

## 2023-11-21 RX ADMIN — HYDROMORPHONE HYDROCHLORIDE 0.5 MG: 1 INJECTION, SOLUTION INTRAMUSCULAR; INTRAVENOUS; SUBCUTANEOUS at 15:57

## 2023-11-21 RX ADMIN — SODIUM CHLORIDE 100 ML/HR: 9 INJECTION, SOLUTION INTRAVENOUS at 06:56

## 2023-11-21 RX ADMIN — ONDANSETRON 4 MG: 2 INJECTION INTRAMUSCULAR; INTRAVENOUS at 20:02

## 2023-11-21 RX ADMIN — HYDROMORPHONE HYDROCHLORIDE 0.5 MG: 1 INJECTION, SOLUTION INTRAMUSCULAR; INTRAVENOUS; SUBCUTANEOUS at 13:02

## 2023-11-21 RX ADMIN — HYDROMORPHONE HYDROCHLORIDE 0.5 MG: 1 INJECTION, SOLUTION INTRAMUSCULAR; INTRAVENOUS; SUBCUTANEOUS at 18:34

## 2023-11-21 RX ADMIN — HYDROMORPHONE HYDROCHLORIDE 1 MG: 1 INJECTION, SOLUTION INTRAMUSCULAR; INTRAVENOUS; SUBCUTANEOUS at 10:10

## 2023-11-21 RX ADMIN — FLUCONAZOLE 400 MG: 400 INJECTION, SOLUTION INTRAVENOUS at 11:37

## 2023-11-21 RX ADMIN — Medication 10 ML: at 08:35

## 2023-11-21 RX ADMIN — HYDROMORPHONE HYDROCHLORIDE 0.5 MG: 1 INJECTION, SOLUTION INTRAMUSCULAR; INTRAVENOUS; SUBCUTANEOUS at 00:38

## 2023-11-21 RX ADMIN — ASPIRIN 81 MG: 81 TABLET, COATED ORAL at 08:34

## 2023-11-21 NOTE — CONSULTS
INFECTIOUS DISEASE CONSULT/INITIAL HOSPITAL VISIT    Lobo Yu  1965  [id number]    Date of consult: 11/21/2023    Admit date: 11/20/2023    Requesting Provider: Dr. Vallecillo  Evaluating physician: Eric Sidhu MD  Reason for Consultation: Perforated sigmoid diverticulitis   Chief Complaint: abdominal pain      Subjective   Lobo Yu is a  58 y.o.  Yr old male With a past medical history of hypertension, and colon polyps who presented On 11/20 due to worsening abdominal pain.  He had a colonoscopy with Dr. Avalos on 11/9 for follow-up for colon polyps there were noted 5 years ago.  He had 3 polyps removed.  He was also noted to have diverticulosis.  He started having abdominal pain the day after his colonoscopy and he notified his GI physician he was initially prescribed PO flagyl with some initial improvement after taking this for about 1 week. After some initial improvement he subsequently went to [place] for vacation but started feeling worse and went to a clinic and was prescribed the same antibiotics there (ciprofloxacin and flagyl per his report).  He still continued to feel poorly when he arrived back to the [place] with abdominal pain.  He is not having any nausea or vomiting.  No renato diarrhea.    Since arrival, the patient is remained afebrile. White blood cell count was initially 10.11 but has trended up to 12.63 today. Pro-calcitonin was 0.34.  Creatinine and LFTs were within normal limits.  Urinalysis showed 1+ leukocyte esterase but was otherwise unremarkable.  Blood cultures are in progress. CT abdomen and pelvis that was done yesterday showed acute sigmoid diverticulitis with renato perforation with some pneumoperitoneum noted that was distant from the sigmoid source.  He did have extensive surrounding inflammatory change without a drainable fluid collection. He was taken to the OR yesterday by Dr. Vallecillo and underwent an exploratory laparotomy with sigmoid colectomy with  colostomy.  He underwent intra-abdominal abscess drainage with a 15 Norwegian Livan drain placed and appendectomy as well. He did have significant signs of inflammation consistent with perforated diverticulitis. No cultures were sent from the procedure. The patient was initially on Zosyn but has been switched to Invanz.  ID has been consulted for antibiotic recs.    Past Medical History:   Diagnosis Date    GERD (gastroesophageal reflux disease)     Hypertension        Past Surgical History:   Procedure Laterality Date    EXPLORATORY LAPAROTOMY N/A 11/20/2023    Procedure: EXPLORATORY LAPAROTOMY, SIGMOID COLECTOMY WITH COLOSTOMY CREAION, INTRA-ABDOMINAL ABSCESS DRAINAGE, APPENDECTOMY;  Surgeon: Hitesh Vallecillo MD;  Location: [facility];  Service: General;  Laterality: N/A;    NEPHROLITHOTOMY      TONSILLECTOMY         Pediatric History   Patient Parents    Not on file     Other Topics Concern    Not on file   Social History Narrative    Not on file       family history is not on file.    No Known Allergies      There is no immunization history on file for this patient.    Medication:    Current Facility-Administered Medications:     acetaminophen (TYLENOL) tablet 650 mg, 650 mg, Oral, Q4H PRN **OR** acetaminophen (TYLENOL) 160 MG/5ML oral solution 650 mg, 650 mg, Oral, Q4H PRN **OR** [DISCONTINUED] acetaminophen (TYLENOL) suppository 650 mg, 650 mg, Rectal, Q4H PRN, Grace Markham DO    aspirin EC tablet 81 mg, 81 mg, Oral, Daily, Hitesh Vallecillo MD, 81 mg at 11/21/23 0834    atenolol (TENORMIN) tablet 50 mg, 50 mg, Oral, Daily, Hitesh Vallecillo MD, 50 mg at 11/21/23 0834    Calcium Replacement - Follow Nurse / BPA Driven Protocol, , Does not apply, PRN, Grace Markham DO    carisoprodol (SOMA) tablet 350 mg, 350 mg, Oral, Q8H PRN, Hitesh Vallecillo MD    ertapenem (INVanz) 1,000 mg in sodium chloride 0.9 % 100 mL IVPB, 1,000 mg, Intravenous, Q24H, Grace Markham DO, Stopped  at 11/21/23 0023    famotidine (PEPCID) tablet 40 mg, 40 mg, Oral, Daily, Hitesh Vallecillo MD, 40 mg at 11/21/23 0834    heparin (porcine) 5000 UNIT/ML injection 5,000 Units, 5,000 Units, Subcutaneous, Q8H, Hitesh Vallecillo MD    HYDROcodone-acetaminophen (NORCO) 5-325 MG per tablet 1 tablet, 1 tablet, Oral, Q4H PRN, Grace Markham,     HYDROmorphone (DILAUDID) injection 1 mg, 1 mg, Intravenous, Q2H PRN **AND** naloxone (NARCAN) injection 0.4 mg, 0.4 mg, Intravenous, Q5 Min PRN, Leena Florentino,     labetalol (NORMODYNE,TRANDATE) injection 10 mg, 10 mg, Intravenous, Q4H PRN, Grace Markham, DO    lansoprazole (PREVACID SOLUTAB) disintegrating tablet Tablet Delayed Release Dispersible 30 mg, 30 mg, Oral, QAM AC, Hitesh Vallecillo MD    Magnesium Standard Dose Replacement - Follow Nurse / BPA Driven Protocol, , Does not apply, PRN, Grace Markham, DO    morphine injection 2 mg, 2 mg, Intravenous, Q4H PRN **AND** naloxone (NARCAN) injection 0.4 mg, 0.4 mg, Intravenous, Q5 Min PRN, Grace Markham, DO    nitroglycerin (NITROSTAT) SL tablet 0.4 mg, 0.4 mg, Sublingual, Q5 Min PRN, Grace Markham, DO    ondansetron (ZOFRAN) tablet 4 mg, 4 mg, Oral, Q6H PRN **OR** ondansetron (ZOFRAN) injection 4 mg, 4 mg, Intravenous, Q6H PRN, Grace Markham,     oxyCODONE-acetaminophen (PERCOCET)  MG per tablet 1 tablet, 1 tablet, Oral, Q4H PRN, Grace Markham, DO    Phosphorus Replacement - Follow Nurse / BPA Driven Protocol, , Does not apply, PRN, Grace Markham, DO    Potassium Replacement - Follow Nurse / BPA Driven Protocol, , Does not apply, PRNRashi Olivia D, DO    Sodium Chloride (PF) 0.9 % 10 mL, 10 mL, Intravenous, PRN, Hitesh Vallecillo MD    [COMPLETED] Insert Peripheral IV, , , Once **AND** sodium chloride 0.9 % flush 10 mL, 10 mL, Intravenous, PRN, Hitesh Vallecillo MD    sodium chloride 0.9 % flush 10 mL, 10 mL, Intravenous, Q12H, Markham,  "Grace AMANDA DO, 10 mL at 11/21/23 0835    sodium chloride 0.9 % flush 10 mL, 10 mL, Intravenous, PRN, Grace Markham DO    sodium chloride 0.9 % infusion 40 mL, 40 mL, Intravenous, PRN, Grace Markham DO    sodium chloride 0.9 % infusion, 100 mL/hr, Intravenous, Continuous, Grace Markham DO, Last Rate: 100 mL/hr at 11/21/23 0656, 100 mL/hr at 11/21/23 0656    triamterene-hydrochlorothiazide (MAXZIDE-25) 37.5-25 MG per tablet 1 tablet, 1 tablet, Oral, Daily, Leena Florentino DO    Please refer to the medical record for a full medication list    Review of Systems:    Constitutional-- No Fever, chills or sweats.  Appetite Poor. + fatigue.  HEENT-- No new vision, hearing or throat complaints.  No epistaxis or oral sores.  Denies odynophagia or dysphagia.  No odynophagia or dysphagia. No headache, photophobia or neck stiffness.  CV-- No chest pain, palpitation or syncope  Resp-- No SOB/cough/Hemoptysis  GI- +Abdominal pain. No nausea, vomiting, or diarrhea.  No hematochezia, melena, or hematemesis. Denies jaundice or chronic liver disease.  -- No dysuria, hematuria, or flank pain.  Denies hesitancy, urgency or flank pain.  Lymph- no swollen lymph nodes in neck/axilla or groin.   Heme- No active bruising or bleeding; no Hx of DVT or PE.  MS-- no swelling or pain in the bones or joints of arms/legs.  No new back pain.  Neuro-- No acute focal weakness or numbness in the arms or legs.  No seizures.  Skin--No rashes or lesions    Physical Exam:   Vital Signs   Temp:  [97.6 °F (36.4 °C)-98.5 °F (36.9 °C)] 97.6 °F (36.4 °C)  Heart Rate:  [54-93] 82  Resp:  [16-18] 18  BP: (115-184)/(80-99) 142/86    Temp  Min: 97.6 °F (36.4 °C)  Max: 98.5 °F (36.9 °C)  BP  Min: 115/80  Max: 184/99  Pulse  Min: 54  Max: 93  Resp  Min: 16  Max: 18  SpO2  Min: 91 %  Max: 100 %    Blood pressure 142/86, pulse 82, temperature 97.6 °F (36.4 °C), temperature source Oral, resp. rate 18, height 180.3 cm (71\"), weight 88.5 kg (195 lb), " SpO2 95%.  GENERAL: Awake and alert, in Mild distress. Appears stated age.    HEENT:  Normocephalic, atraumatic.  No external oral lesions noted.  No thrush noted.  NG tube in place.  EYES: PERRL. No conjunctival injection. No icterus.   HEART: RRR, no murmur  LUNGS: Clear to auscultation and percussion. No respiratory distress, no use of accessory muscles.  ABDOMEN: Soft, Moderately distended, colostomy in place in the left upper quadrant. Diffusely tender to palpation. Right upper quadrant drain in place with serosanguineous drainage. No appreciable HSM.  No bowel sounds heard  GENITAL: no Pitt catheter  SKIN: no generalized rashes.  No peripheral stigmata of infective endocarditis noted.  PSYCHIATRIC: cooperative.  Appropriate mood and affect  EXT:  No cellulitic change.  NEURO: awake alert and oriented ×4.  Normal speech and cognition    Results Review:   I reviewed the patient's new clinical results.  I reviewed the patient's new imaging results and agree with the interpretation.  I reviewed the patient's other test results and agree with the interpretation    Results from last 7 days   Lab Units 11/21/23  0528 11/20/23  1145   WBC 10*3/mm3 12.63* 10.11   HEMOGLOBIN g/dL 13.0 14.0   HEMATOCRIT % 38.7 41.5   PLATELETS 10*3/mm3 293 285     Results from last 7 days   Lab Units 11/21/23  0528   SODIUM mmol/L 140   POTASSIUM mmol/L 4.3   CHLORIDE mmol/L 101   CO2 mmol/L 28.0   BUN mg/dL 10   CREATININE mg/dL 0.89   GLUCOSE mg/dL 133*   CALCIUM mg/dL 8.5*     Results from last 7 days   Lab Units 11/21/23  0528   ALK PHOS U/L 47   BILIRUBIN mg/dL 0.6   ALT (SGPT) U/L 18   AST (SGOT) U/L 20                 Results from last 7 days   Lab Units 11/20/23  1145   LACTATE mmol/L 1.0     Estimated Creatinine Clearance: 114.6 mL/min (by C-G formula based on SCr of 0.89 mg/dL).     Procalitonin Results:      Lab 11/20/23  1145   PROCALCITONIN 0.34*      Brief Urine Lab Results  (Last result in the past 365 days)        Color   " Clarity   Blood   Leuk Est   Nitrite   Protein   CREAT   Urine HCG        11/20/23 1147 Yellow   Clear   Negative   Small (1+)   Negative   Negative                  No results found for: \"SITE\", \"ALLENTEST\", \"PHART\", \"SYY8VBC\", \"PO2ART\", \"NWZ8KCX\", \"BASEEXCESS\", \"O5SRTECM\", \"HGBBG\", \"HCTABG\", \"OXYHEMOGLOBI\", \"METHHGBN\", \"CARBOXYHGB\", \"CO2CT\", \"BAROMETRIC\", \"MODALITY\", \"FIO2\"     Microbiology:  Blood culture ×2: In progress      Radiology:  Imaging Results (Last 72 Hours)       Procedure Component Value Units Date/Time    CT Abdomen Pelvis With Contrast [[id number]] Collected: 11/20/23 1348     Updated: 11/20/23 1409    Narrative:      CT ABDOMEN PELVIS W CONTRAST    Date of Exam: 11/20/2023 1:30 PM EST    Indication: LLQ pain.    Comparison: CT abdomen pelvis 11/25/2022    Technique: Axial CT images were obtained of the abdomen and pelvis following the uneventful intravenous administration of 85 mL Isovue-300. Reconstructed coronal and sagittal images were also obtained. Automated exposure control and iterative   construction methods were used.    Findings:    Lower thorax: Lung bases are clear.    Liver: No focal hepatic lesion. Suspected hepatic steatosis.    Gallbladder and bile ducts: Gallbladder is unremarkable. No biliary ductal dilatation.    Pancreas: No pancreatic duct dilation. No surrounding inflammation.    Spleen: Normal in size. Calcified granulomata.    Adrenal glands: No discrete adrenal nodule.    Kidneys: No hydronephrosis. Right renal cysts. Normal, symmetric excretion of contrast on delayed phase.    Urinary bladder: Unremarkable.    Reproductive organs: Prostatomegaly.    Stomach and bowel: Extensive fat stranding and free gas surrounding sigmoid diverticular disease with regional wall thickening. Secondary enteritis involving adjacent small bowel loops. No evidence of bowel obstruction. No evidence of appendicitis. Small   hiatal hernia.    Lymph nodes: No pathologically enlarged lymph " nodes.    Vessels: No abdominal aortic aneurysm. Atherosclerosis.    Peritoneum and retroperitoneum: Pneumoperitoneum, some of which is distant from the perforated sigmoid diverticulitis, including antidependent gas in the anterior peritoneum. No drainable fluid collection at this time.    Soft tissues: Tiny fat-containing umbilical hernia.    Osseous structures: No suspicious osseous lesions.      Impression:      Impression:    Acute sigmoid diverticulitis with renato perforation, with some pneumoperitoneum noted to be distant from the sigmoid/source. There is extensive surrounding inflammatory change without drainable fluid collection at this time. Recommend surgery consult.    Electronically Signed: Asher Garcia MD    11/20/2023 2:06 PM EST    Workstation ID: [id number]        I independent read the patient's CT abdomen pelvis from 11/20/23    IMPRESSION:     Problems:  Perforated sigmoid diverticulitis with intra-abdominal abscess-status post exploratory laparotomy, sigmoid colectomy with colostomy creation, and drainage of intra-abdominal abscess with drain placement and incidental appendectomy on 11/20/23 by Dr. Vallecillo  Leukocytosis-likely secondary to a postoperative leukemoid reaction.  Intro abdominal infection could be playing a role but he did not leukocytosis on arrival  Hypertension    RECOMMENDATIONS:    -Continue to monitor CBC and CMP closely for now  -Follow pending blood cultures  -Surgery is following.  Awaiting stoma function.  Plan to start mobilizing the patient today  -Agree with Invanz 1 g IV every 24 hours  -Add fluconazole 400 mg IV every 24 hours. Can be changed to by mouth when he is eating/tolerating by mouth meds    He could likely benefit from at least a 2-3 weeks of antimicrobial therapy.  He could potentially infuse INPAT through a PIV or at home through a PICC line.  I have discussed this in length with him today.    He is not ready for discharge yet.  Need to start mobilizing him  and make sure that he does not require physical therapy.  Additionally awaiting stoma function.  Still has an NG tube in place and not eating yet.    I discussed in length with the patient and his wife at bedside today.    Thank you for asking me to see Lobo Yu.     Complex MDM    Eric Sidhu MD  11/21/2023

## 2023-11-21 NOTE — PROGRESS NOTES
"General Surgery Post op Follow Up Note    Subjective:   Complains of abdominal soreness.    /86 (BP Location: Right arm, Patient Position: Lying)   Pulse 82   Temp 97.6 °F (36.4 °C) (Oral)   Resp 18   Ht 180.3 cm (71\")   Wt 88.5 kg (195 lb)   SpO2 95%   BMI 27.20 kg/m²     General Appearance: Mild distress  Abdomen: incision is clean and dry, DEBO benign.    CBC  Results from last 7 days   Lab Units 11/21/23  0528   WBC 10*3/mm3 12.63*   HEMOGLOBIN g/dL 13.0   HEMATOCRIT % 38.7   PLATELETS 10*3/mm3 293       CMP/BMP  Results from last 7 days   Lab Units 11/21/23  0528   SODIUM mmol/L 140   POTASSIUM mmol/L 4.3   CHLORIDE mmol/L 101   CO2 mmol/L 28.0   BUN mg/dL 10   CREATININE mg/dL 0.89   CALCIUM mg/dL 8.5*   BILIRUBIN mg/dL 0.6   ALK PHOS U/L 47   ALT (SGPT) U/L 18   AST (SGOT) U/L 20   GLUCOSE mg/dL 133*         ASSESSMENT/PLAN:  Postop day 1 sigmoid colectomy with end colostomy and abscess drainage    Awaiting stoma function.  Volume resuscitation.  Mobilize, walk in the halls, up to the chair.      Hitesh Vallecillo MD  11/21/2023  08:56 EST   "

## 2023-11-21 NOTE — ED PROVIDER NOTES
Subjective   History of Present Illness  58-year-old male presents for evaluation of abdominal pain.  Of note, the patient tells me that he had a colonoscopy in early November by Dr. Avalos.  Shortly thereafter, he began experiencing abdominal pain and was prescribed ciprofloxacin and Flagyl for presumptive diverticulitis.  He tells me that he felt better and ended up going on a trip to [place].  About 5 days ago he began experiencing recurrent abdominal pain that localized to his left lower quadrant.  The pain is gradually worsened over that span.  He denies any fevers.  He denies any vomiting.  He notes a poor appetite as a result of his ongoing symptoms.  He currently rates his pain at 8 out of 10 in severity and notes that the pain is worse with movement and walking.      Review of Systems   Constitutional:  Positive for appetite change.   Gastrointestinal:  Positive for abdominal pain and nausea.   All other systems reviewed and are negative.      Past Medical History:   Diagnosis Date    GERD (gastroesophageal reflux disease)     Hypertension        No Known Allergies    Past Surgical History:   Procedure Laterality Date    NEPHROLITHOTOMY      TONSILLECTOMY         History reviewed. No pertinent family history.    Social History     Socioeconomic History    Marital status:    Tobacco Use    Smoking status: Former     Types: Cigarettes           Objective   Physical Exam  Vitals and nursing note reviewed.   Constitutional:       General: He is not in acute distress.     Appearance: He is well-developed. He is not diaphoretic.      Comments: Nontoxic-appearing male   HENT:      Head: Normocephalic and atraumatic.   Neck:      Vascular: No JVD.   Cardiovascular:      Rate and Rhythm: Normal rate and regular rhythm.      Heart sounds: Normal heart sounds. No murmur heard.     No friction rub. No gallop.   Pulmonary:      Effort: Pulmonary effort is normal. No respiratory distress.      Breath sounds: Normal  breath sounds. No wheezing or rales.   Abdominal:      General: Bowel sounds are normal. There is no distension.      Palpations: Abdomen is soft. There is no mass.      Tenderness: There is abdominal tenderness. There is guarding.      Comments: Focal tenderness noted with palpation of left lower quadrant, guarding noted, no pain out of proportion to exam   Genitourinary:     Comments: No CVA tenderness present  Musculoskeletal:         General: Normal range of motion.      Cervical back: Normal range of motion.   Skin:     General: Skin is warm and dry.      Coloration: Skin is not pale.      Findings: No erythema or rash.      Comments: No dermatomal rash noted   Neurological:      Mental Status: He is alert and oriented to person, place, and time.   Psychiatric:         Thought Content: Thought content normal.         Judgment: Judgment normal.         Procedures           ED Course  ED Course as of 11/20/23 2019   Mon Nov 20, 2023 2017 58-year-old male presents for evaluation of left lower quadrant abdominal pain.  On arrival to the ED, the patient is nontoxic-appearing.  Exam remarkable for focal left lower quadrant abdominal tenderness with guarding noted.  No pain out of proportion to exam.  No CVA tenderness noted.  No dermatomal rash present.  Labs are bland/unrevealing. [DD]   2017 I personally and independently reviewed the patient's CT images and findings, and I am in agreement with the radiologist regarding CT interpretation--particularly regarding diverticulitis with perforation. [DD]   2017 Zosyn initiated. [DD]   2017 I discussed the patient's case with Dr. Vallecillo of general surgery who will see the patient in consult and take the patient to the OR for definitive surgical management.  He recommended admission to the hospitalist.  I discussed the patient's case with Dr. Levy, and the patient will be admitted under his care for further evaluation and treatment.  The patient is aware/agreeable  with the plan at this time. [DD]      ED Course User Index  [DD] Eric Deshpande MD                                      Recent Results (from the past 24 hour(s))   Comprehensive Metabolic Panel    Collection Time: 11/20/23 11:45 AM    Specimen: Blood   Result Value Ref Range    Glucose 122 (H) 65 - 99 mg/dL    BUN 10 6 - 20 mg/dL    Creatinine 0.99 0.76 - 1.27 mg/dL    Sodium 140 136 - 145 mmol/L    Potassium 3.6 3.5 - 5.2 mmol/L    Chloride 102 98 - 107 mmol/L    CO2 27.0 22.0 - 29.0 mmol/L    Calcium 9.1 8.6 - 10.5 mg/dL    Total Protein 7.4 6.0 - 8.5 g/dL    Albumin 4.0 3.5 - 5.2 g/dL    ALT (SGPT) 19 1 - 41 U/L    AST (SGOT) 16 1 - 40 U/L    Alkaline Phosphatase 71 39 - 117 U/L    Total Bilirubin 0.8 0.0 - 1.2 mg/dL    Globulin 3.4 gm/dL    A/G Ratio 1.2 g/dL    BUN/Creatinine Ratio 10.1 7.0 - 25.0    Anion Gap 11.0 5.0 - 15.0 mmol/L    eGFR 88.9 >60.0 mL/min/1.73   Lipase    Collection Time: 11/20/23 11:45 AM    Specimen: Blood   Result Value Ref Range    Lipase 49 13 - 60 U/L   Lactic Acid, Plasma    Collection Time: 11/20/23 11:45 AM    Specimen: Blood   Result Value Ref Range    Lactate 1.0 0.5 - 2.0 mmol/L   Green Top (Gel)    Collection Time: 11/20/23 11:45 AM   Result Value Ref Range    Extra Tube Hold for add-ons.    Lavender Top    Collection Time: 11/20/23 11:45 AM   Result Value Ref Range    Extra Tube hold for add-on    Gold Top - SST    Collection Time: 11/20/23 11:45 AM   Result Value Ref Range    Extra Tube Hold for add-ons.    Gray Top    Collection Time: 11/20/23 11:45 AM   Result Value Ref Range    Extra Tube Hold for add-ons.    Light Blue Top    Collection Time: 11/20/23 11:45 AM   Result Value Ref Range    Extra Tube Hold for add-ons.    CBC Auto Differential    Collection Time: 11/20/23 11:45 AM    Specimen: Blood   Result Value Ref Range    WBC 10.11 3.40 - 10.80 10*3/mm3    RBC 4.43 4.14 - 5.80 10*6/mm3    Hemoglobin 14.0 13.0 - 17.7 g/dL    Hematocrit 41.5 37.5 - 51.0 %    MCV 93.7  79.0 - 97.0 fL    MCH 31.6 26.6 - 33.0 pg    MCHC 33.7 31.5 - 35.7 g/dL    RDW 12.8 12.3 - 15.4 %    RDW-SD 44.3 37.0 - 54.0 fl    MPV 9.8 6.0 - 12.0 fL    Platelets 285 140 - 450 10*3/mm3    Neutrophil % 73.2 42.7 - 76.0 %    Lymphocyte % 12.9 (L) 19.6 - 45.3 %    Monocyte % 11.7 5.0 - 12.0 %    Eosinophil % 1.4 0.3 - 6.2 %    Basophil % 0.2 0.0 - 1.5 %    Immature Grans % 0.6 (H) 0.0 - 0.5 %    Neutrophils, Absolute 7.41 (H) 1.70 - 7.00 10*3/mm3    Lymphocytes, Absolute 1.30 0.70 - 3.10 10*3/mm3    Monocytes, Absolute 1.18 (H) 0.10 - 0.90 10*3/mm3    Eosinophils, Absolute 0.14 0.00 - 0.40 10*3/mm3    Basophils, Absolute 0.02 0.00 - 0.20 10*3/mm3    Immature Grans, Absolute 0.06 (H) 0.00 - 0.05 10*3/mm3    nRBC 0.0 0.0 - 0.2 /100 WBC   Procalcitonin    Collection Time: 11/20/23 11:45 AM    Specimen: Blood   Result Value Ref Range    Procalcitonin 0.34 (H) 0.00 - 0.25 ng/mL   ABO RH Specimen Verification    Collection Time: 11/20/23 11:45 AM    Specimen: Blood   Result Value Ref Range    ABO Type O     RH type Negative    Urinalysis With Microscopic If Indicated (No Culture) - Urine, Clean Catch    Collection Time: 11/20/23 11:47 AM    Specimen: Urine, Clean Catch   Result Value Ref Range    Color, UA Yellow Yellow, Straw    Appearance, UA Clear Clear    pH, UA 5.5 5.0 - 8.0    Specific Gravity, UA 1.021 1.001 - 1.030    Glucose, UA Negative Negative    Ketones, UA Negative Negative    Bilirubin, UA Negative Negative    Blood, UA Negative Negative    Protein, UA Negative Negative    Leuk Esterase, UA Small (1+) (A) Negative    Nitrite, UA Negative Negative    Urobilinogen, UA 0.2 E.U./dL 0.2 - 1.0 E.U./dL   Urinalysis, Microscopic Only - Urine, Clean Catch    Collection Time: 11/20/23 11:47 AM    Specimen: Urine, Clean Catch   Result Value Ref Range    RBC, UA 0-2 None Seen, 0-2 /HPF    WBC, UA 0-2 None Seen, 0-2 /HPF    Bacteria, UA None Seen None Seen, Trace /HPF    Squamous Epithelial Cells, UA 0-2 None Seen, 0-2  /HPF    Hyaline Casts, UA 0-6 0 - 6 /LPF    Methodology Automated Microscopy    Type & Screen    Collection Time: 11/20/23  3:25 PM    Specimen: Blood   Result Value Ref Range    ABO Type O     RH type Negative     Antibody Screen Negative     T&S Expiration Date 11/23/2023 11:59:59 PM      Note: In addition to lab results from this visit, the labs listed above may include labs taken at another facility or during a different encounter within the last 24 hours. Please correlate lab times with ED admission and discharge times for further clarification of the services performed during this visit.    CT Abdomen Pelvis With Contrast   Final Result   Impression:      Acute sigmoid diverticulitis with renato perforation, with some pneumoperitoneum noted to be distant from the sigmoid/source. There is extensive surrounding inflammatory change without drainable fluid collection at this time. Recommend surgery consult.      Electronically Signed: Asher Garcia MD     11/20/2023 2:06 PM EST     Workstation ID: [id number]        Vitals:    11/20/23 1915 11/20/23 1930 11/20/23 1945 11/20/23 2000   BP: 121/81 125/84 115/80 135/95   BP Location:       Patient Position:       Pulse: 71 75 71 71   Resp: 16 16 16 16   Temp:    98.5 °F (36.9 °C)   TempSrc:    Temporal   SpO2: 97% 95% 92% 96%   Weight:       Height:         Medications   Sodium Chloride (PF) 0.9 % 10 mL ( Intravenous MAR Unhold 11/20/23 2017)   sodium chloride 0.9 % flush 10 mL ( Intravenous MAR Unhold 11/20/23 2017)   sodium chloride 0.9 % flush 10 mL (has no administration in time range)   sodium chloride 0.9 % flush 10 mL (has no administration in time range)   sodium chloride 0.9 % infusion 40 mL (has no administration in time range)   sennosides-docusate (PERICOLACE) 8.6-50 MG per tablet 2 tablet (has no administration in time range)     And   polyethylene glycol (MIRALAX) packet 17 g (has no administration in time range)     And   bisacodyl (DULCOLAX) EC tablet 5 mg  (has no administration in time range)     And   bisacodyl (DULCOLAX) suppository 10 mg (has no administration in time range)   nitroglycerin (NITROSTAT) SL tablet 0.4 mg (has no administration in time range)   sodium chloride 0.9 % infusion (has no administration in time range)   Potassium Replacement - Follow Nurse / BPA Driven Protocol (has no administration in time range)   Magnesium Standard Dose Replacement - Follow Nurse / BPA Driven Protocol (has no administration in time range)   Phosphorus Replacement - Follow Nurse / BPA Driven Protocol (has no administration in time range)   Calcium Replacement - Follow Nurse / BPA Driven Protocol (has no administration in time range)   acetaminophen (TYLENOL) tablet 650 mg (has no administration in time range)     Or   acetaminophen (TYLENOL) 160 MG/5ML oral solution 650 mg (has no administration in time range)     Or   acetaminophen (TYLENOL) suppository 650 mg (has no administration in time range)   HYDROcodone-acetaminophen (NORCO) 5-325 MG per tablet 1 tablet (has no administration in time range)   morphine injection 2 mg (has no administration in time range)     And   naloxone (NARCAN) injection 0.4 mg (has no administration in time range)   ondansetron (ZOFRAN) tablet 4 mg (has no administration in time range)     Or   ondansetron (ZOFRAN) injection 4 mg (has no administration in time range)   oxyCODONE-acetaminophen (PERCOCET)  MG per tablet 1 tablet (has no administration in time range)   HYDROmorphone (DILAUDID) injection 0.5 mg (has no administration in time range)     And   naloxone (NARCAN) injection 0.4 mg (has no administration in time range)   ertapenem (INVanz) 1,000 mg in sodium chloride 0.9 % 100 mL IVPB (has no administration in time range)   labetalol (NORMODYNE,TRANDATE) injection 10 mg (has no administration in time range)   lactated ringers infusion ( Intravenous Anesthesia Volume Adjustment 11/20/23 8900)   aspirin EC tablet 81 mg (has no  administration in time range)   atenolol (TENORMIN) tablet 50 mg (has no administration in time range)   famotidine (PEPCID) tablet 40 mg (has no administration in time range)   lansoprazole (PREVACID SOLUTAB) disintegrating tablet Tablet Delayed Release Dispersible 30 mg (has no administration in time range)   Morphine sulfate (PF) injection 4 mg (has no administration in time range)   carisoprodol (SOMA) tablet 350 mg (has no administration in time range)   ondansetron (ZOFRAN) injection 4 mg (has no administration in time range)   HYDROcodone-acetaminophen (NORCO) 5-325 MG per tablet 1 tablet (has no administration in time range)   sodium chloride 0.9 % bolus 500 mL (500 mL Intravenous New Bag 11/20/23 1353)   ondansetron (ZOFRAN) injection 4 mg (4 mg Intravenous Given 11/20/23 1358)   oxyCODONE-acetaminophen (PERCOCET) 5-325 MG per tablet 1 tablet (1 tablet Oral Given 11/20/23 1358)   iopamidol (ISOVUE-370) 76 % injection 100 mL (85 mL Intravenous Given 11/20/23 1346)   piperacillin-tazobactam (ZOSYN) 3.375 g in iso-osmotic dextrose 50 ml (premix) (3.375 g Intravenous Given 11/20/23 1555)   famotidine (PEPCID) tablet 20 mg (20 mg Oral Given 11/20/23 1541)     ECG/EMG Results (last 24 hours)       ** No results found for the last 24 hours. **          No orders to display              Medical Decision Making  Amount and/or Complexity of Data Reviewed  Labs: ordered.  Radiology: ordered.    Risk  Prescription drug management.  Decision regarding hospitalization.        Final diagnoses:   Diverticulitis of colon with perforation       ED Disposition  ED Disposition       ED Disposition   Decision to Admit    Condition   --    Comment   Level of Care: Telemetry [5]   Diagnosis: Perforated diverticulum [041822]   Admitting Physician: TEO THOMPSON [[id number]]                 No follow-up provider specified.       Medication List        ASK your doctor about these medications      ciprofloxacin 500 MG  tablet  Commonly known as: Cipro  Take 1 tablet by mouth 2 (Two) Times a Day.  Ask about: Which instructions should I use?     metroNIDAZOLE 500 MG tablet  Commonly known as: FLAGYL  Take 1 tablet by mouth 3 (Three) Times a Day.  Ask about: Which instructions should I use?                 Eric Deshpande MD  11/20/23 2019

## 2023-11-21 NOTE — PAYOR COMM NOTE
"Lobo Cueva (58 y.o. Male)       Date of Birth   1965    Social Security Number       Address   [address] [address]    Home Phone   [phone]    MRN   [id number]       M[employer]on   None    Marital Status                               Admission Date   23    Admission Type   Emergency    Admitting Provider   Leena Florentino DO    Attending Provider   Leena Florentino DO    Department, Room/Bed   [address], S586/1       Discharge Date       Discharge Disposition       Discharge Destination                                 Attending Provider: Leena Florentino DO    Allergies: No Known Allergies    Isolation: None   Infection: None   Code Status: CPR    Ht: 180.3 cm (71\")   Wt: 88.5 kg (195 lb)    Admission Cmt: None   Principal Problem: Perforated diverticulum [K57.80]                   Active Insurance as of 2023       Primary Coverage       Payor Plan Insurance Group Employer/Plan Group    ANTHEM BLUE CROSS ANTHEM BLUE CROSS BLUE SHIELD PPO [id number]       Payor Plan Address Payor Plan Phone Number Payor Plan Fax Number Effective Dates    [address] [phone]  2020 - None Entered    [place] [address]         Subscriber Name Subscriber Birth Date Member ID       LOBO CUEVA 1965 [id number]                     Emergency Contacts        (Rel.) Home Phone Work Phone Mobile Phone    CALIXTO CUEVA (Spouse) -- -- [phone]              [place]: [facility] [id number] Tax ID [id number]  Insurance Information                  ANTHEM BLUE CROSS/ANTHEM BLUE CROSS BLUE SHIELD PPO Phone: [phone]    Subscriber: Lobo Cueva Subscriber#: [id number]    Group#: [id number] Precert#: [id number]             History & Physical        Grace Markham DO at 23 1438              [facility] Medicine Services  HISTORY AND PHYSICAL    Patient Name: Lobo Cueva  : 1965  MRN: " [id number]  Primary Care Physician: Provider, No Known  Date of admission: 11/20/2023      Subjective  Subjective     Chief Complaint:  Abdominal pain    HPI:  Lobo Yu is a 58 y.o. male with PMHx HTN and colon polyps who presents with increased abdominal pain. Pt had a colonoscopy with Dr Avalos on 11/9 for follow-up for colon polyps from 5 years ago. He had 3 polyps removed. Also noted to have diverticulitis. Patient states he started having abdominal pain the day after colonoscopy. He notified his GI physician and was prescribed Cipro + Flagyl. States he felt improved and then went to [place] for a trip. While there, he felt worse and went to a clinic and was prescribed the same medications. States when he got back, he still felt bad. He was later given Bentyl. He has not had any fevers or chills. Denies nausea or vomiting. Has had soft stools but not diarrhea. In ED, CT imaging with acute sigmoid diverticulitis with renato perforation with pneumoperitoneum. General Surgery, Dr Vallecillo, evaluated in ED. Plan to take to OR today. Hospital medicine asked to admit.     Personal History     Past Medical History:   Diagnosis Date    GERD (gastroesophageal reflux disease)     Hypertension              Past Surgical History:   Procedure Laterality Date    NEPHROLITHOTOMY      TONSILLECTOMY         Family History: family history is not on file.     Social History:  reports that he has quit smoking. His smoking use included cigarettes. He does not have any smokeless tobacco history on file.  Social History     Social History Narrative    Not on file       Medications:  Available home medication information reviewed.  (Not in a hospital admission)      Allergies   Allergen Reactions    Amoxicillin GI Intolerance       Objective  Objective     Vital Signs:   Temp:  [98.3 °F (36.8 °C)] 98.3 °F (36.8 °C)  Heart Rate:  [54-74] 54  Resp:  [17] 17  BP: (184)/(99) 184/99       Physical Exam   Constitutional: Awake, alert,  NAD, non-toxic, pleasant  Eyes: PERRLA, sclerae anicteric, no conjunctival injection  HENT: NCAT, mucous membranes moist  Neck: Supple, no thyromegaly, no lymphadenopathy, trachea midline  Respiratory: Clear to auscultation bilaterally, nonlabored respirations   Cardiovascular: RRR, no murmurs, rubs, or gallops, palpable pedal pulses bilaterally  Gastrointestinal: Positive bowel sounds, soft, TTP LLQ  Musculoskeletal: No bilateral ankle edema, no clubbing or cyanosis to extremities  Psychiatric: Appropriate affect, cooperative  Neurologic: Oriented x 3, strength symmetric in all extremities, Cranial Nerves grossly intact to confrontation, speech clear  Skin: No rashes     Result Review:  I have personally reviewed the results from the time of this admission to 11/20/2023 14:54 EST and agree with these findings:  [x]  Laboratory list / accordion  []  Microbiology  [x]  Radiology  []  EKG/Telemetry   []  Cardiology/Vascular   []  Pathology  [x]  Old records  []  Other:      LAB RESULTS:      Lab 11/20/23  1145   WBC 10.11   HEMOGLOBIN 14.0   HEMATOCRIT 41.5   PLATELETS 285   NEUTROS ABS 7.41*   IMMATURE GRANS (ABS) 0.06*   LYMPHS ABS 1.30   MONOS ABS 1.18*   EOS ABS 0.14   MCV 93.7   PROCALCITONIN 0.34*   LACTATE 1.0         Lab 11/20/23  1145   SODIUM 140   POTASSIUM 3.6   CHLORIDE 102   CO2 27.0   ANION GAP 11.0   BUN 10   CREATININE 0.99   EGFR 88.9   GLUCOSE 122*   CALCIUM 9.1         Lab 11/20/23  1145   TOTAL PROTEIN 7.4   ALBUMIN 4.0   GLOBULIN 3.4   ALT (SGPT) 19   AST (SGOT) 16   BILIRUBIN 0.8   ALK PHOS 71   LIPASE 49                     UA          11/20/2023    11:47   Urinalysis   Squamous Epithelial Cells, UA 0-2    Specific Gravity, UA 1.021    Ketones, UA Negative    Blood, UA Negative    Leukocytes, UA Small (1+)    Nitrite, UA Negative    RBC, UA 0-2    WBC, UA 0-2    Bacteria, UA None Seen        Microbiology Results (last 10 days)       ** No results found for the last 240 hours. **            CT  Abdomen Pelvis With Contrast    Result Date: 11/20/2023  CT ABDOMEN PELVIS W CONTRAST Date of Exam: 11/20/2023 1:30 PM EST Indication: LLQ pain. Comparison: CT abdomen pelvis 11/25/2022 Technique: Axial CT images were obtained of the abdomen and pelvis following the uneventful intravenous administration of 85 mL Isovue-300. Reconstructed coronal and sagittal images were also obtained. Automated exposure control and iterative construction methods were used. Findings: Lower thorax: Lung bases are clear. Liver: No focal hepatic lesion. Suspected hepatic steatosis. Gallbladder and bile ducts: Gallbladder is unremarkable. No biliary ductal dilatation. Pancreas: No pancreatic duct dilation. No surrounding inflammation. Spleen: Normal in size. Calcified granulomata. Adrenal glands: No discrete adrenal nodule. Kidneys: No hydronephrosis. Right renal cysts. Normal, symmetric excretion of contrast on delayed phase. Urinary bladder: Unremarkable. Reproductive organs: Prostatomegaly. Stomach and bowel: Extensive fat stranding and free gas surrounding sigmoid diverticular disease with regional wall thickening. Secondary enteritis involving adjacent small bowel loops. No evidence of bowel obstruction. No evidence of appendicitis. Small  hiatal hernia. Lymph nodes: No pathologically enlarged lymph nodes. Vessels: No abdominal aortic aneurysm. Atherosclerosis. Peritoneum and retroperitoneum: Pneumoperitoneum, some of which is distant from the perforated sigmoid diverticulitis, including antidependent gas in the anterior peritoneum. No drainable fluid collection at this time. Soft tissues: Tiny fat-containing umbilical hernia. Osseous structures: No suspicious osseous lesions.     Impression: Impression: Acute sigmoid diverticulitis with renato perforation, with some pneumoperitoneum noted to be distant from the sigmoid/source. There is extensive surrounding inflammatory change without drainable fluid collection at this time.  Recommend surgery consult. Electronically Signed: Asher Garcia MD  11/20/2023 2:06 PM EST  Workstation ID: [id number]         Assessment & Plan  Assessment & Plan     Active Hospital Problems    Diagnosis  POA    Perforated diverticulum [K57.80]  Unknown    Essential hypertension [I10]  Unknown    Pulmonary hypertension [I27.20]  Unknown       Acute Sigmoid Diverticulitis with perforation & Pneumoperitoneum   Hx Diverticulosis   S/P Colonoscopy with polyp removal x3 11/6/23  -NPO  -to OR today with Dr Vallecillo, all risks/benefits/complications discussed at bedside  -IV ABX   -IVF  -Pain Control   -Blood Cx pending   -Consult to ID in AM     HTN  -Resume home meds when able to take PO  -PRN IV Labetalol     DVT prophylaxis:  Mechanical    CODE STATUS:  Full Code   There are no questions and answers to display.       Expected Discharge   Expected Discharge Date: 11/24/2023; Expected Discharge Time:      Grace Markham DO  11/20/23     Electronically signed by Grace Markham DO at 11/20/23 2751       Current Facility-Administered Medications   Medication Dose Route Frequency Provider Last Rate Last Admin    acetaminophen (TYLENOL) tablet 650 mg  650 mg Oral Q4H PRN Grace Markham DO        Or    acetaminophen (TYLENOL) 160 MG/5ML oral solution 650 mg  650 mg Oral Q4H PRN Grace Markham DO        Or    acetaminophen (TYLENOL) suppository 650 mg  650 mg Rectal Q4H PRN Grace Markham DO        aspirin EC tablet 81 mg  81 mg Oral Daily Hitesh Vallecillo MD        atenolol (TENORMIN) tablet 50 mg  50 mg Oral Daily Hitesh Vallecillo MD        sennosides-docusate (PERICOLACE) 8.6-50 MG per tablet 2 tablet  2 tablet Oral BID Grace Markham DO   2 tablet at 11/20/23 2989    And    polyethylene glycol (MIRALAX) packet 17 g  17 g Oral Daily PRN Grace Markham DO        And    bisacodyl (DULCOLAX) EC tablet 5 mg  5 mg Oral Daily PRN Grace Markham DO        And    bisacodyl  (DULCOLAX) suppository 10 mg  10 mg Rectal Daily PRN Grace Markham DO        Calcium Replacement - Follow Nurse / BPA Driven Protocol   Does not apply PRN Grace Markham DO        carisoprodol (SOMA) tablet 350 mg  350 mg Oral Q8H PRN Hitesh Vallecillo MD        ertapenem (INVanz) 1,000 mg in sodium chloride 0.9 % 100 mL IVPB  1,000 mg Intravenous Q24H Grace Markham DO   Stopped at 11/21/23 0023    famotidine (PEPCID) tablet 40 mg  40 mg Oral Daily Hitesh Valelcillo MD        HYDROcodone-acetaminophen (NORCO) 5-325 MG per tablet 1 tablet  1 tablet Oral Q4H PRN Grace Markham DO        HYDROmorphone (DILAUDID) injection 0.5 mg  0.5 mg Intravenous Q2H PRN Grace Markham DO   0.5 mg at 11/21/23 0652    And    naloxone (NARCAN) injection 0.4 mg  0.4 mg Intravenous Q5 Min PRN Grace Markham DO        labetalol (NORMODYNE,TRANDATE) injection 10 mg  10 mg Intravenous Q4H PRN Grace Markham DO        lactated ringers infusion  9 mL/hr Intravenous Continuous Yissel Simpson DO   Stopped at 11/21/23 0024    lansoprazole (PREVACID SOLUTAB) disintegrating tablet Tablet Delayed Release Dispersible 30 mg  30 mg Oral QAM AC Hitesh Vallecillo MD        Magnesium Standard Dose Replacement - Follow Nurse / BPA Driven Protocol   Does not apply PRN Grace Markham DO        morphine injection 2 mg  2 mg Intravenous Q4H PRN Grace Markham DO        And    naloxone (NARCAN) injection 0.4 mg  0.4 mg Intravenous Q5 Min PRN Grace Markham DO        nitroglycerin (NITROSTAT) SL tablet 0.4 mg  0.4 mg Sublingual Q5 Min PRN Grace Markham DO        ondansetron (ZOFRAN) tablet 4 mg  4 mg Oral Q6H PRN Grace Markham DO        Or    ondansetron (ZOFRAN) injection 4 mg  4 mg Intravenous Q6H PRN Grace Markham DO        oxyCODONE-acetaminophen (PERCOCET)  MG per tablet 1 tablet  1 tablet Oral Q4H PRN Grace Markham, DO        Phosphorus Replacement -  Follow Nurse / BPA Driven Protocol   Does not apply PRN Grace Markham D, DO        Potassium Replacement - Follow Nurse / BPA Driven Protocol   Does not apply PRN Grace Markham D, DO        Sodium Chloride (PF) 0.9 % 10 mL  10 mL Intravenous PRN Hitesh Vallecillo MD        sodium chloride 0.9 % flush 10 mL  10 mL Intravenous PRN Hitesh Vallecillo MD        sodium chloride 0.9 % flush 10 mL  10 mL Intravenous Q12H Grace Markham, DO   10 mL at 11/20/23 2126    sodium chloride 0.9 % flush 10 mL  10 mL Intravenous PRN Grace Markham, DO        sodium chloride 0.9 % infusion 40 mL  40 mL Intravenous PRN Grace Markham D, DO        sodium chloride 0.9 % infusion  100 mL/hr Intravenous Continuous Grace Markham D,  mL/hr at 11/21/23 0656 100 mL/hr at 11/21/23 0656     Lab Results (last 24 hours)       Procedure Component Value Units Date/Time    Comprehensive Metabolic Panel [[id number]]  (Abnormal) Collected: 11/21/23 0528    Specimen: Blood Updated: 11/21/23 0645     Glucose 133 mg/dL      BUN 10 mg/dL      Creatinine 0.89 mg/dL      Sodium 140 mmol/L      Potassium 4.3 mmol/L      Chloride 101 mmol/L      CO2 28.0 mmol/L      Calcium 8.5 mg/dL      Total Protein 6.1 g/dL      Albumin 3.6 g/dL      ALT (SGPT) 18 U/L      AST (SGOT) 20 U/L      Alkaline Phosphatase 47 U/L      Total Bilirubin 0.6 mg/dL      Globulin 2.5 gm/dL      Comment: Calculated Result        A/G Ratio 1.4 g/dL      BUN/Creatinine Ratio 11.2     Anion Gap 11.0 mmol/L      eGFR 100.0 mL/min/1.73     Narrative:      GFR Normal >60  Chronic Kidney Disease <60  Kidney Failure <15      CBC Auto Differential [[id number]]  (Abnormal) Collected: 11/21/23 0528    Specimen: Blood Updated: 11/21/23 0624     WBC 12.63 10*3/mm3      RBC 4.17 10*6/mm3      Hemoglobin 13.0 g/dL      Hematocrit 38.7 %      MCV 92.8 fL      MCH 31.2 pg      MCHC 33.6 g/dL      RDW 12.8 %      RDW-SD 43.8 fl      MPV 9.3 fL      Platelets 293  10*3/mm3      Neutrophil % 84.1 %      Lymphocyte % 5.4 %      Monocyte % 9.8 %      Eosinophil % 0.0 %      Basophil % 0.1 %      Immature Grans % 0.6 %      Neutrophils, Absolute 10.63 10*3/mm3      Lymphocytes, Absolute 0.68 10*3/mm3      Monocytes, Absolute 1.24 10*3/mm3      Eosinophils, Absolute 0.00 10*3/mm3      Basophils, Absolute 0.01 10*3/mm3      Immature Grans, Absolute 0.07 10*3/mm3      nRBC 0.0 /100 WBC     [place] Draw [[id number]] Collected: 11/20/23 1145    Specimen: Blood Updated: 11/20/23 1601    Narrative:      The following orders were created for panel order [place] Draw.  Procedure                               Abnormality         Status                     ---------                               -----------         ------                     Green Top (Gel)[[id number]]                                  Final result               Lavender Top[[id number]]                                     Final result               Gold Top - SST[[id number]]                                   Final result               Gray Top[[id number]]                                         Final result               Light Blue Top[[id number]]                                   Final result                 Please view results for these tests on the individual orders.    [place]immer Top [[id number]] Collected: 11/20/23 1145    Specimen: Blood Updated: 11/20/23 1601     Extra Tube Hold for add-ons.     Comment: Auto resulted.       Blood Culture - Blood, Arm, Right [[id number]] Collected: 11/20/23 1501    Specimen: Blood from Arm, Right Updated: 11/20/23 1527    Blood Culture - Blood, Arm, Right [[id number]] Collected: 11/20/23 1430    Specimen: Blood from Arm, Right Updated: 11/20/23 1446    Procalcitonin [[id number]]  (Abnormal) Collected: 11/20/23 1145    Specimen: Blood Updated: 11/20/23 1436     Procalcitonin 0.34 ng/mL     Narrative:      As a Marker for Sepsis (Non-Neonates):    1. <0.5 ng/mL represents a low risk of severe sepsis  "and/or septic shock.  2. >2 ng/mL represents a high risk of severe sepsis and/or septic shock.    As a Marker for Lower Respiratory Tract Infections that require antibiotic therapy:    PCT on Admission    Antibiotic Therapy       6-12 Hrs later    >0.5                Strongly Recommended  >0.25 - <0.5        Recommended   0.1 - 0.25          Discouraged              Remeasure/reassess PCT  <0.1                Strongly Discouraged     Remeasure/reassess PCT    As 28 day mortality risk marker: \"Change in Procalcitonin Result\" (>80% or <=80%) if Day 0 (or Day 1) and Day 4 values are available. Refer to http://www.[employer] [facility]-pct-calculator.com    Change in PCT <=80%  A decrease of PCT levels below or equal to 80% defines a positive change in PCT test result representing a higher risk for 28-day all-cause mortality of patients diagnosed with severe sepsis for septic shock.    Change in PCT >80%  A decrease of PCT levels of more than 80% defines a negative change in PCT result representing a lower risk for 28-day all-cause mortality of patients diagnosed with severe sepsis or septic shock.       Green Top (Gel) [[id number]] Collected: 11/20/23 1145    Specimen: Blood Updated: 11/20/23 1300     Extra Tube Hold for add-ons.     Comment: Auto resulted.       Lavender Top [[id number]] Collected: 11/20/23 1145    Specimen: Blood Updated: 11/20/23 1300     Extra Tube hold for add-on     Comment: Auto resulted       Gold Top - SST [[id number]] Collected: 11/20/23 1145    Specimen: Blood Updated: 11/20/23 1300     Extra Tube Hold for add-ons.     Comment: Auto resulted.       Light Blue Top [[id number]] Collected: 11/20/23 1145    Specimen: Blood Updated: 11/20/23 1300     Extra Tube Hold for add-ons.     Comment: Auto resulted       Urinalysis With Microscopic If Indicated (No Culture) - Urine, Clean Catch [[id number]]  (Abnormal) Collected: 11/20/23 1147    Specimen: Urine, Clean Catch Updated: 11/20/23 1258     Color, UA Yellow     " Appearance, UA Clear     pH, UA 5.5     Specific Gravity, UA 1.021     Glucose, UA Negative     Ketones, UA Negative     Bilirubin, UA Negative     Blood, UA Negative     Protein, UA Negative     Leuk Esterase, UA Small (1+)     Nitrite, UA Negative     Urobilinogen, UA 0.2 E.U./dL    Urinalysis, Microscopic Only - Urine, Clean Catch [[id number]] Collected: 11/20/23 1147    Specimen: Urine, Clean Catch Updated: 11/20/23 1258     RBC, UA 0-2 /HPF      WBC, UA 0-2 /HPF      Bacteria, UA None Seen /HPF      Squamous Epithelial Cells, UA 0-2 /HPF      Hyaline Casts, UA 0-6 /LPF      Methodology Automated Microscopy    Comprehensive Metabolic Panel [[id number]]  (Abnormal) Collected: 11/20/23 1145    Specimen: Blood Updated: 11/20/23 1250     Glucose 122 mg/dL      BUN 10 mg/dL      Creatinine 0.99 mg/dL      Sodium 140 mmol/L      Potassium 3.6 mmol/L      Chloride 102 mmol/L      CO2 27.0 mmol/L      Calcium 9.1 mg/dL      Total Protein 7.4 g/dL      Albumin 4.0 g/dL      ALT (SGPT) 19 U/L      AST (SGOT) 16 U/L      Alkaline Phosphatase 71 U/L      Total Bilirubin 0.8 mg/dL      Globulin 3.4 gm/dL      Comment: Calculated Result        A/G Ratio 1.2 g/dL      BUN/Creatinine Ratio 10.1     Anion Gap 11.0 mmol/L      eGFR 88.9 mL/min/1.73     Narrative:      GFR Normal >60  Chronic Kidney Disease <60  Kidney Failure <15      Lipase [[id number]]  (Normal) Collected: 11/20/23 1145    Specimen: Blood Updated: 11/20/23 1250     Lipase 49 U/L     Lactic Acid, Plasma [[id number]]  (Normal) Collected: 11/20/23 1145    Specimen: Blood Updated: 11/20/23 1246     Lactate 1.0 mmol/L      Comment: Falsely depressed results may occur on samples drawn from patients receiving N-Acetylcysteine (NAC) or Metamizole.       CBC & Differential [[id number]]  (Abnormal) Collected: 11/20/23 1145    Specimen: Blood Updated: 11/20/23 1231    Narrative:      The following orders were created for panel order CBC & Differential.  Procedure                                Abnormality         Status                     ---------                               -----------         ------                     CBC Auto Differential[[id number]]        Abnormal            Final result                 Please view results for these tests on the individual orders.    CBC Auto Differential [[id number]]  (Abnormal) Collected: 11/20/23 1145    Specimen: Blood Updated: 11/20/23 1231     WBC 10.11 10*3/mm3      RBC 4.43 10*6/mm3      Hemoglobin 14.0 g/dL      Hematocrit 41.5 %      MCV 93.7 fL      MCH 31.6 pg      MCHC 33.7 g/dL      RDW 12.8 %      RDW-SD 44.3 fl      MPV 9.8 fL      Platelets 285 10*3/mm3      Neutrophil % 73.2 %      Lymphocyte % 12.9 %      Monocyte % 11.7 %      Eosinophil % 1.4 %      Basophil % 0.2 %      Immature Grans % 0.6 %      Neutrophils, Absolute 7.41 10*3/mm3      Lymphocytes, Absolute 1.30 10*3/mm3      Monocytes, Absolute 1.18 10*3/mm3      Eosinophils, Absolute 0.14 10*3/mm3      Basophils, Absolute 0.02 10*3/mm3      Immature Grans, Absolute 0.06 10*3/mm3      nRBC 0.0 /100 WBC           Imaging Results (Last 24 Hours)       Procedure Component Value Units Date/Time    CT Abdomen Pelvis With Contrast [[id number]] Collected: 11/20/23 1348     Updated: 11/20/23 1409    Narrative:      CT ABDOMEN PELVIS W CONTRAST    Date of Exam: 11/20/2023 1:30 PM EST    Indication: LLQ pain.    Comparison: CT abdomen pelvis 11/25/2022    Technique: Axial CT images were obtained of the abdomen and pelvis following the uneventful intravenous administration of 85 mL Isovue-300. Reconstructed coronal and sagittal images were also obtained. Automated exposure control and iterative   construction methods were used.    Findings:    Lower thorax: Lung bases are clear.    Liver: No focal hepatic lesion. Suspected hepatic steatosis.    Gallbladder and bile ducts: Gallbladder is unremarkable. No biliary ductal dilatation.    Pancreas: No pancreatic duct dilation. No surrounding  inflammation.    Spleen: Normal in size. Calcified granulomata.    Adrenal glands: No discrete adrenal nodule.    Kidneys: No hydronephrosis. Right renal cysts. Normal, symmetric excretion of contrast on delayed phase.    Urinary bladder: Unremarkable.    Reproductive organs: Prostatomegaly.    Stomach and bowel: Extensive fat stranding and free gas surrounding sigmoid diverticular disease with regional wall thickening. Secondary enteritis involving adjacent small bowel loops. No evidence of bowel obstruction. No evidence of appendicitis. Small   hiatal hernia.    Lymph nodes: No pathologically enlarged lymph nodes.    Vessels: No abdominal aortic aneurysm. Atherosclerosis.    Peritoneum and retroperitoneum: Pneumoperitoneum, some of which is distant from the perforated sigmoid diverticulitis, including antidependent gas in the anterior peritoneum. No drainable fluid collection at this time.    Soft tissues: Tiny fat-containing umbilical hernia.    Osseous structures: No suspicious osseous lesions.      Impression:      Impression:    Acute sigmoid diverticulitis with renato perforation, with some pneumoperitoneum noted to be distant from the sigmoid/source. There is extensive surrounding inflammatory change without drainable fluid collection at this time. Recommend surgery consult.    Electronically Signed: Asher Garcia MD    11/20/2023 2:06 PM EST    Workstation ID: [id number]          Orders (last 24 hrs)        Start     Ordered    11/21/23 0900  aspirin EC tablet 81 mg  Daily         11/20/23 2017 11/21/23 0900  atenolol (TENORMIN) tablet 50 mg  Daily         11/20/23 2017 11/21/23 0900  famotidine (PEPCID) tablet 40 mg  Daily         11/20/23 2017 11/21/23 0800  Oral Care  2 Times Daily       11/20/23 2017 11/21/23 0746  Inpatient Admission  Once         11/21/23 0746    11/21/23 0730  lansoprazole (PREVACID SOLUTAB) disintegrating tablet Tablet Delayed Release Dispersible 30 mg  Every Morning Before  Breakfast         11/20/23 2017 11/21/23 0702  Inpatient Infectious Diseases Consult  IN AM        Specialty:  Infectious Diseases  Provider:  Emeterio Sidhu MD    11/20/23 2017 11/21/23 0600  Comprehensive Metabolic Panel  Morning Draw         11/20/23 2017 11/21/23 0600  CBC Auto Differential  Morning Draw         11/20/23 2017 11/21/23 0546  Wound Ostomy Eval & Treat  Once        Comments: Brand new cololstomy    11/21/23 0546    11/21/23 0000  Vital Signs  Every 4 Hours       11/20/23 2017 11/20/23 2200  ertapenem (INVanz) 1,000 mg in sodium chloride 0.9 % 100 mL IVPB  Every 24 Hours         11/20/23 2017 11/20/23 2115  sodium chloride 0.9 % flush 10 mL  Every 12 Hours Scheduled         11/20/23 2017 11/20/23 2115  sennosides-docusate (PERICOLACE) 8.6-50 MG per tablet 2 tablet  2 Times Daily        See Hyperspace for full Linked Orders Report.    11/20/23 2017 11/20/23 2115  sodium chloride 0.9 % infusion  Continuous         11/20/23 2017 11/20/23 2018  Intake & Output  Every Shift       11/20/23 2017 11/20/23 2018  Weigh Patient  Once         11/20/23 2017 11/20/23 2018  Insert Peripheral IV  Once         11/20/23 2017 11/20/23 2018  Saline Lock & Maintain IV Access  Continuous,   Status:  Canceled         11/20/23 2017 11/20/23 2018  Code Status and Medical Interventions:  Continuous         11/20/23 2017 11/20/23 2018  Place Sequential Compression Device  Once         11/20/23 2017 11/20/23 2018  Maintain Sequential Compression Device  Continuous         11/20/23 2017 11/20/23 2018  Continuous Cardiac Monitoring  Continuous        Comments: Follow Standing Orders As Outlined in Process Instructions (Open Order Report to View Full Instructions)    11/20/23 2017 11/20/23 2018  Telemetry - Maintain IV Access  Continuous         11/20/23 2017 11/20/23 2018  Telemetry - Place Orders & Notify Provider of Results When Patient Experiences Acute Chest Pain,  Dysrhythmia or Respiratory Distress  Until Discontinued         11/20/23 2017 11/20/23 2018  May Be Off Telemetry for Tests  Continuous         11/20/23 2017 11/20/23 2018  Pulse Oximetry, Continuous  Continuous         11/20/23 2017 11/20/23 2018  Activity - Ad Sisi  Until Discontinued         11/20/23 2017 11/20/23 2018  NPO Diet NPO Type: Strict NPO  Diet Effective Now,   Status:  Canceled         11/20/23 2017 11/20/23 2018  Nasogastric Tube Maintenance  Continuous         11/20/23 2017 11/20/23 2018  Strict Intake & Output  Every Shift      Comments: Record drain output every shift.    11/20/23 2017 11/20/23 2018  NPO Diet NPO Type: Sips with Meds, Ice Chips  Diet Effective Now        Comments: May have ice chips, sips of water, popsicles, and a cup of sherbet every shift.    11/20/23 2017 11/20/23 2017  Morphine sulfate (PF) injection 4 mg  Every 4 Hours PRN,   Status:  Discontinued         11/20/23 2017 11/20/23 2017  carisoprodol (SOMA) tablet 350 mg  Every 8 Hours PRN         11/20/23 2017 11/20/23 2017  ondansetron (ZOFRAN) injection 4 mg  Every 6 Hours PRN,   Status:  Discontinued         11/20/23 2017 11/20/23 2017  HYDROcodone-acetaminophen (NORCO) 5-325 MG per tablet 1 tablet  Every 6 Hours PRN,   Status:  Discontinued         11/20/23 2017 11/20/23 2017  polyethylene glycol (MIRALAX) packet 17 g  Daily PRN        See Hyperspace for full Linked Orders Report.    11/20/23 2017 11/20/23 2017  bisacodyl (DULCOLAX) EC tablet 5 mg  Daily PRN        See Hyperspace for full Linked Orders Report.    11/20/23 2017 11/20/23 2017  bisacodyl (DULCOLAX) suppository 10 mg  Daily PRN        See Hyperspace for full Linked Orders Report.    11/20/23 2017 11/20/23 2017  acetaminophen (TYLENOL) tablet 650 mg  Every 4 Hours PRN        See Hyperspace for full Linked Orders Report.    11/20/23 2017 11/20/23 2017  acetaminophen (TYLENOL) 160 MG/5ML oral solution 650 mg  Every 4  Hours PRN        See Hyperspace for full Linked Orders Report.    11/20/23 2017 11/20/23 2017  acetaminophen (TYLENOL) suppository 650 mg  Every 4 Hours PRN        See Hyperspace for full Linked Orders Report.    11/20/23 2017 11/20/23 2017  morphine injection 2 mg  Every 4 Hours PRN        See Hyperspace for full Linked Orders Report.    11/20/23 2017 11/20/23 2017  naloxone (NARCAN) injection 0.4 mg  Every 5 Minutes PRN        See Hyperspace for full Linked Orders Report.    11/20/23 2017 11/20/23 2017  ondansetron (ZOFRAN) tablet 4 mg  Every 6 Hours PRN        See Hyperspace for full Linked Orders Report.    11/20/23 2017 11/20/23 2017  ondansetron (ZOFRAN) injection 4 mg  Every 6 Hours PRN        See Hyperspace for full Linked Orders Report.    11/20/23 2017 11/20/23 2017  oxyCODONE-acetaminophen (PERCOCET)  MG per tablet 1 tablet  Every 4 Hours PRN         11/20/23 2017 11/20/23 2017  HYDROmorphone (DILAUDID) injection 0.5 mg  Every 2 Hours PRN        See Hyperspace for full Linked Orders Report.    11/20/23 2017 11/20/23 2017  naloxone (NARCAN) injection 0.4 mg  Every 5 Minutes PRN        See Hyperspace for full Linked Orders Report.    11/20/23 2017 11/20/23 2017  labetalol (NORMODYNE,TRANDATE) injection 10 mg  Every 4 Hours PRN         11/20/23 2017 11/20/23 2017  sodium chloride 0.9 % flush 10 mL  As Needed         11/20/23 2017 11/20/23 2017  sodium chloride 0.9 % infusion 40 mL  As Needed         11/20/23 2017 11/20/23 2017  nitroglycerin (NITROSTAT) SL tablet 0.4 mg  Every 5 Minutes PRN         11/20/23 2017 11/20/23 2017  Potassium Replacement - Follow Nurse / BPA Driven Protocol  As Needed         11/20/23 2017 11/20/23 2017  Magnesium Standard Dose Replacement - Follow Nurse / BPA Driven Protocol  As Needed         11/20/23 2017 11/20/23 2017  Phosphorus Replacement - Follow Nurse / BPA Driven Protocol  As Needed         11/20/23 2017 11/20/23 2017   Calcium Replacement - Follow Nurse / BPA Driven Protocol  As Needed         11/20/23 2017 11/20/23 2017  HYDROcodone-acetaminophen (NORCO) 5-325 MG per tablet 1 tablet  Every 4 Hours PRN         11/20/23 2017 11/20/23 1834  Tissue Pathology Exam  RELEASE UPON ORDERING         11/20/23 1834 11/20/23 1813  Oxygen Therapy- Blow by - Humidified  Continuous,   Status:  Canceled         11/20/23 1812 11/20/23 1813  Vital signs every 5 minutes for 15 minutes, every 15 minutes thereafter.  Once,   Status:  Canceled         11/20/23 1812 11/20/23 1813  Call Anesthesiologist for additional IV Fluid bolus for Hypotension/Tachycardia  Until Discontinued,   Status:  Canceled         11/20/23 1812 11/20/23 1813  Notify Anesthesia of Any Acute Changes in Patient Condition  Until Discontinued,   Status:  Canceled         11/20/23 1812 11/20/23 1813  Notify Anesthesia for Unrelieved Pain  Until Discontinued,   Status:  Canceled         11/20/23 1812 11/20/23 1813  Once DC criteria to floor met, follow surgeon's orders.  Until Discontinued,   Status:  Canceled         11/20/23 1812 11/20/23 1813  Discharge patient from PACU when discharge criteria is met.  Until Discontinued,   Status:  Canceled         11/20/23 1812 11/20/23 1812  lactated ringers bolus 500 mL  Once As Needed,   Status:  Discontinued         11/20/23 1812 11/20/23 1812  HYDROmorphone (DILAUDID) injection 0.5 mg  Every 5 Minutes PRN,   Status:  Discontinued         11/20/23 1812 11/20/23 1812  fentaNYL citrate (PF) (SUBLIMAZE) injection 50 mcg  Every 5 Minutes PRN,   Status:  Discontinued         11/20/23 1812 11/20/23 1812  Pulse Oximetry, Continuous  Continuous,   Status:  Canceled         11/20/23 1811 11/20/23 1812  Assess Need for Indwelling Urinary Catheter - Follow Removal Protocol  Continuous        Comments: Follow Protocol As Outlined in Process Instructions (Open Order Report to View Full Instructions)    11/20/23  1811    11/20/23 1812  Urinary Catheter Care  Every Shift       11/20/23 1811    11/20/23 1624  Insert Indwelling Urinary Catheter  Once,   Status:  Canceled        Comments: Follow Protocol As Outlined in Process Instructions (Open Order Report to View Full Instructions)    11/20/23 1623    11/20/23 1620  sodium chloride (NS) irrigation solution  As Needed,   Status:  Discontinued         11/20/23 1620    11/20/23 1556  ABO RH Specimen Verification  STAT         11/20/23 1555    11/20/23 1541  famotidine (PEPCID) tablet 20 mg  Once         11/20/23 1539    11/20/23 1540  Oxygen Therapy- Nasal Cannula; Titrate 1-6 LPM Per SpO2; 90 - 95%  Continuous,   Status:  Canceled         11/20/23 1539    11/20/23 1540  Pulse Oximetry, Continuous  Continuous,   Status:  Canceled         11/20/23 1539    11/20/23 1539  Vital Signs - Per Anesthesia Protocol  As Needed,   Status:  Canceled       11/20/23 1539    11/20/23 1539  midazolam (VERSED) injection 1 mg  Every 10 Minutes PRN,   Status:  Discontinued         11/20/23 1539    11/20/23 1531  lactated ringers infusion  Continuous         11/20/23 1529    11/20/23 1523  Type & Screen  Once         11/20/23 1523    11/20/23 1427  piperacillin-tazobactam (ZOSYN) 3.375 g in iso-osmotic dextrose 50 ml (premix)  Once         11/20/23 1411    11/20/23 1425  Cardiac Monitoring  Continuous,   Status:  Canceled        Comments: Follow Standing Orders As Outlined in Process Instructions (Open Order Report to View Full Instructions)    11/20/23 1424    11/20/23 1424  ED Bed Request  Once         11/20/23 1423    11/20/23 1424  Initiate Observation Status  Once         11/20/23 1424    11/20/23 1412  Blood Culture - Blood, Arm, Right  Once         11/20/23 1411    11/20/23 1412  Blood Culture - Blood, Arm, Right  Once         11/20/23 1411    11/20/23 1412  Procalcitonin  Once         11/20/23 1411    11/20/23 1402  iopamidol (ISOVUE-370) 76 % injection 100 mL  Once in Imaging          11/20/23 1346    11/20/23 1257  Urinalysis, Microscopic Only - Urine, Clean Catch  Once         11/20/23 1256    11/20/23 1231  oxyCODONE-acetaminophen (PERCOCET) 5-325 MG per tablet 1 tablet  Once         11/20/23 1215    11/20/23 1220  sodium chloride 0.9 % bolus 500 mL  Once         11/20/23 1204    11/20/23 1220  ondansetron (ZOFRAN) injection 4 mg  Once         11/20/23 1204    11/20/23 1220  HYDROmorphone (DILAUDID) injection 0.5 mg  Once,   Status:  Discontinued         11/20/23 1204    11/20/23 1205  CT Abdomen Pelvis With Contrast  1 Time Imaging        Comments: IV CONTRAST ONLY      11/20/23 1204    11/20/23 1205  Insert Peripheral IV  Once        See Hyperspace for full Linked Orders Report.    11/20/23 1204    11/20/23 1204  sodium chloride 0.9 % flush 10 mL  As Needed        See Hyperspace for full Linked Orders Report.    11/20/23 1204    11/20/23 1121  NPO Diet NPO Type: Strict NPO  Diet Effective Now,   Status:  Canceled         11/20/23 1120    11/20/23 1121  Undress & Gown  Once         11/20/23 1120    11/20/23 1121  Insert Peripheral IV  Once         11/20/23 1120    11/20/23 1121  Early Draw  Once         11/20/23 1120    11/20/23 1121  CBC & Differential  Once         11/20/23 1120    11/20/23 1121  Comprehensive Metabolic Panel  Once         11/20/23 1120    11/20/23 1121  Lipase  Once         11/20/23 1120    11/20/23 1121  Urinalysis With Microscopic If Indicated (No Culture) - Urine, Clean Catch  Once         11/20/23 1120    11/20/23 1121  Lactic Acid, Plasma  Once         11/20/23 1120    11/20/23 1121  Green Top (Gel)  PROCEDURE ONCE         11/20/23 1120    11/20/23 1121  Lavender Top  PROCEDURE ONCE         11/20/23 1120    11/20/23 1121  Gold Top - SST  PROCEDURE ONCE         11/20/23 1120    11/20/23 1121  Zimmer Top  PROCEDURE ONCE         11/20/23 1120    11/20/23 1121  Light Blue Top  PROCEDURE ONCE         11/20/23 1120    11/20/23 1121  CBC Auto Differential  PROCEDURE ONCE          11/20/23 1120    11/20/23 1120  Sodium Chloride (PF) 0.9 % 10 mL  As Needed         11/20/23 1120    Unscheduled  Oxygen Therapy- Nasal Cannula; Titrate 1-6 LPM Per SpO2; 94% or Greater  Continuous PRN       11/20/23 2017    Signed and Held  POC Glucose Once  Once,   Status:  Canceled        Comments: For all diabetic patients. Notify Anesthesiologist for blood sugar > 180.      Signed and Held    Signed and Held  Insert Peripheral IV  Once,   Status:  Canceled         Signed and Held    Signed and Held  Saline Lock & Maintain IV Access  Continuous,   Status:  Canceled         Signed and Held    Signed and Held  sodium chloride 0.9 % flush 10 mL  Every 12 Hours Scheduled,   Status:  Canceled         Signed and Held    Signed and Held  sodium chloride 0.9 % flush 10 mL  As Needed,   Status:  Canceled         Signed and Held    Signed and Held  sodium chloride 0.9 % infusion 40 mL  As Needed,   Status:  Canceled         Signed and Held    Signed and Held  lidocaine PF 1% (XYLOCAINE) injection 0.5 mL  Once As Needed,   Status:  Canceled         Signed and Held    Signed and Held  lactated ringers infusion  Continuous,   Status:  Canceled         Signed and Held    Signed and Held  Potassium  Once,   Status:  Canceled        Comments: For all patients with renal disease, within 3 days if taking digoxin, potassium-depleting anti-hypertensives, or diuretics. Nursing: discontinue this order if not completed in pre-op; there is no need to act on this order once the patient is out of surgery.      Signed and Held    Signed and Held  ECG 12 Lead Pre-Op / Pre-Procedure  Once,   Status:  Canceled        Comments: If no ECG within the previous 6 months or any of the following: Heart/Valvular Disease; Previous Abnormal EKG; Diabetic; Renal Failure; Chest Pain; Hypertension; Emphysema/Respiratory Disease - Read by Anesthesiologist    Signed and Held    Signed and Held  famotidine (PEPCID) injection 20 mg  Once,   Status:   Canceled         Signed and Held    Signed and Held  May take Beta Blocker from home with sip of water.  Once,   Status:  Canceled        Comments: If patient currently taking Beta Blocker and has not taken within 24 hours    Signed and Held                     Operative/Procedure Notes (last 24 hours)        Hitesh Vallecillo MD at 11/20/23 1613          General Surgery Operative Note    Lobo Yu  [id number]  1965    Date of Surgery:  11/20/2023 18:35 EST    Pre-op Diagnosis: Perforated sigmoid diverticulitis    Post-op Diagnosis: Perforated sigmoid diverticulitis              Intra-abdominal abscess    Procedure: Exploratory laparotomy           Sigmoid colectomy with colostomy creation            Intra-abdominal abscess drainage            15 Slovak Livan drain, pelvis             Incidental appendectomy    Surgeon: Hitesh Vallecillo MD    Circulator: Lobo Lobato RN  Scrub Person: Hilary Mosher  Assistant: Foreign Cadet PA     Assistant: Foreign Cadet PA  was responsible for performing the following activities: Retraction, Suction, Irrigation, Suturing, Closing, and Placing Dressing and their skilled assistance was necessary for the success of this case.    Anesthesia: General with bilateral tap blocks    Fluids: 1100 mL of crystalloid    Estimated Blood Loss: Less than 100 mL    Urine Voided: 600 mL    Specimens: Sigmoid colon & appendix                  Complications: None apparent    History:   58-year-old gentleman presented to the emergency room with acute worsening of abdominal pain.  His imaging demonstrated free intra-abdominal air with a likely colonic etiology..      The risks and benefits of exploratory laparotomy, bowel resection possible, colostomy creation were rehashed.  Our discussion included but was not limited to: bleeding, infection, injury to adjacent viscera (ureters, colon, small bowel, spleen etc.), chronic pain, incisional hernia formation, need  for reintervention, and medical issues from a cardiopulmonary and deep venous thrombosis standpoint.  All questions were answered and they understood and wished to proceed with surgical intervention.    Procedure:      After informed consent, the patient was taken to the operating room and placed in the supine position.  Appropriate antibiotic prophylaxis was given to the patient. General anesthesia was induced, bilateral TAP blocks were placed by anesthesia, a Pitt catheter was placed, the abdomen was then prepped and draped in the standard sterile fashion. A time out was observed.     A midline incision was created in the standard fashion.  The peritoneum was entered sharply, upon initial inspection it did appear that the majority of the pathology was in the patient's pelvis.  I extended the fascial incision down to the level of the pubis.  There appeared to be a significant inflammatory reaction involving the sigmoid colon and surrounding small bowel.  Finger fracture dissection freed the small bowel, open to the intra-abdominal abscess and  the sigmoid colon from the pelvis and sidewall.  This did have the appearance of a perforated sigmoid diverticulitis.  At this point I created a window in the mesentery at the level of the sacral promontory.  This was extended down toward the pelvis.  I did identify the ureter on both the right and left hand side.  A window in the mesoappendix was created and a green load with the contour stapler was taken across the proximal rectum.  The [place] LigaSure was used to take the sigmoid mesentery back to healthy mesentery proximal to the inflammatory mass.  The sigmoid was then transected again with the green contour stapler.  This was passed off the specimen.  Meticulous hemostasis was obtained.  The rectal stump was marked with 2 Prolene suture.  The appendix extended down into the proximal portion of the resection thus I felt that removing the appendix was  appropriate.  A window in the mesoappendix was created with a right angle.  A 55 ROSA stapler was taken across the appendiceal base and the mesoappendix was taken with the [place] LigaSure.  The small bowel was run from the completely lysing inflammatory adhesions and ensuring the small bowel was in good order with no retained fluid collections.  I then mobilized the sigmoid colon along the white line of Toldt.  Adequate mobility was obtained for colostomy creation.   The nasogastric tube was noted to be in the gastric lumen.  The abdomen was then copiously irrigated with warm saline.  A left sided colostomy was created in the standard fashion through the rectus muscle.  There was adequate length for colostomy creation and the colon was brought through the abdominal wall in standard form.  A 15 Armenian Livan drain was placed into the pelvis bringing this out a right upper quadrant separate stab incision.  This was sewn into place with a 2-0 nylon.  The omentum was placed over the small bowel down into the pelvis.  The midline fascial defect was closed with 2 #1 looped PDS.  The skin was reapproximated with staples loosely.  Betadine soaked Telfa jenifer were placed to the interstices of the staples.  The colostomy was matured with 3-0 chromic suture.  A colostomy bag was placed over the left-sided stoma.  And the drain was placed to bulb suction.     All lap and needle counts were reported as correct at the end of the procedure ×2.  The patient was then transferred to the PACU.    Hitesh Vallecillo MD     Date: 11/20/2023  Time: 18:35 EST     Electronically signed by Hitesh Vallecillo MD at 11/20/23 1850       Physician Progress Notes (last 24 hours)  Notes from 11/20/23 0817 through 11/21/23 0817   No notes of this type exist for this encounter.          Consult Notes (last 24 hours)        Hitesh Vallecillo MD at 11/20/23 6204          General Surgery Consultation Note    Date of Service:  11/20/2023  Ochoaalcides Levinons  [id number]  1965      Referring Provider: Eric Deshpande MD    Location of Consult: ER     Reason for Consultation: Free intra-abdominal air, hollow viscus perforation    History of Present Illness:  I am seeing, Ochoaalcides Gigi, in consultation for Eric Deshpande MD regarding free air.  58-year-old gentleman presents with the acute worsening of abdominal pain in the mid to low abdomen.  He underwent an elective colonoscopy at the beginning of this month and began having abdominal pain afterward.  He did undergo polypectomy at that point.  He touched base with Dr. Avalos whom prescribed antibiotics.  He subsequently traveled to [place] and developed worsening abdominal pain and diarrhea.  He has also had low-grade fever.  With worsening abdominal pain he presented to the emergency room today.  His work-up included a CT scan of the abdomen and pelvis which demonstrated free intra-abdominal air, likely source the sigmoid colon.  Outside of low-grade fever and abdominal pain he denies headache, nausea, vomiting, jaundice, acholic stool, or tea colored urine.  He has had no symptoms similar to this in the past.    Past Medical History:   Diagnosis Date    GERD (gastroesophageal reflux disease)     Hypertension        Past Surgical History:    NEPHROLITHOTOMY    TONSILLECTOMY       Allergies   Allergen Reactions    Amoxicillin GI Intolerance       No current facility-administered medications on file prior to encounter.     Current Outpatient Medications on File Prior to Encounter   Medication Sig Dispense Refill    aspirin 81 MG EC tablet Take 1 tablet by mouth Daily. OTC      atenolol (TENORMIN) 50 MG tablet Take 1 tablet by mouth Daily.      ciprofloxacin (Cipro) 500 MG tablet Take 1 tablet by mouth 2 (Two) Times a Day. (Patient taking differently: Take 1 tablet by mouth 2 (Two) Times a Day. For 10 days, started on 11-17-23) 20 tablet 0    dicyclomine (BENTYL) 10 MG  capsule Take 2 capsules by mouth 3 (Three) Times a Day As Needed for Abdominal Cramping. 180 capsule 3    famotidine (PEPCID) 40 MG tablet Take 1 tablet by mouth Daily.      fluticasone (FLONASE) 50 MCG/ACT nasal spray 2 sprays by Each Nare route Daily As Needed for Allergies or Rhinitis. OTC      metroNIDAZOLE (FLAGYL) 500 MG tablet Take 1 tablet by mouth 3 (Three) Times a Day. (Patient taking differently: Take 1 tablet by mouth 3 (Three) Times a Day. For 10 days, started on 11-17-23) 30 tablet 0    omeprazole (priLOSEC) 40 MG capsule Take 1 capsule by mouth 2 (Two) Times a Week.      sildenafil (REVATIO) 20 MG tablet Take 3 tablets by mouth Daily As Needed.      triamterene-hydrochlorothiazide (DYAZIDE) 37.5-25 MG per capsule Take 1 capsule by mouth Daily.      [DISCONTINUED] ciprofloxacin (CIPRO) 500 MG tablet Take 1 tablet by mouth 2 (Two) Times a Day. 20 tablet 0    [DISCONTINUED] HYDROcodone-acetaminophen (NORCO) 7.5-325 MG per tablet Take 1 tablet by mouth Every 6 (Six) Hours As Needed for Severe Pain. 12 tablet 0    [DISCONTINUED] metroNIDAZOLE (FLAGYL) 500 MG tablet Take 1 tablet by mouth 2 (Two) Times a Day. 14 tablet 0    [DISCONTINUED] metroNIDAZOLE (FLAGYL) 500 MG tablet Take 1 tablet by mouth 3 (Three) Times a Day. 30 tablet 0    [DISCONTINUED] ondansetron ODT (ZOFRAN-ODT) 4 MG disintegrating tablet Place 1 tablet on the tongue Every 6 (Six) Hours As Needed for Nausea or Vomiting. 20 tablet 0    [DISCONTINUED] Sod Picosulfate-Mag Ox-Cit Acd (Clenpiq) 10-3.5-12 MG-GM -GM/160ML solution Take 350 mL by mouth Take As Directed. 350 mL 0         Current Facility-Administered Medications:     piperacillin-tazobactam (ZOSYN) 3.375 g in iso-osmotic dextrose 50 ml (premix), 3.375 g, Intravenous, Once, Eric Deshpande MD    Sodium Chloride (PF) 0.9 % 10 mL, 10 mL, Intravenous, PRN, Eric Deshpande MD    [COMPLETED] Insert Peripheral IV, , , Once **AND** sodium chloride 0.9 % flush 10 mL, 10 mL,  Intravenous, PRN, Deshpande, Eric Jarvis MD    Current Outpatient Medications:     aspirin 81 MG EC tablet, Take 1 tablet by mouth Daily. OTC, Disp: , Rfl:     atenolol (TENORMIN) 50 MG tablet, Take 1 tablet by mouth Daily., Disp: , Rfl:     ciprofloxacin (Cipro) 500 MG tablet, Take 1 tablet by mouth 2 (Two) Times a Day. (Patient taking differently: Take 1 tablet by mouth 2 (Two) Times a Day. For 10 days, started on 11-17-23), Disp: 20 tablet, Rfl: 0    dicyclomine (BENTYL) 10 MG capsule, Take 2 capsules by mouth 3 (Three) Times a Day As Needed for Abdominal Cramping., Disp: 180 capsule, Rfl: 3    famotidine (PEPCID) 40 MG tablet, Take 1 tablet by mouth Daily., Disp: , Rfl:     fluticasone (FLONASE) 50 MCG/ACT nasal spray, 2 sprays by Each Nare route Daily As Needed for Allergies or Rhinitis. OTC, Disp: , Rfl:     metroNIDAZOLE (FLAGYL) 500 MG tablet, Take 1 tablet by mouth 3 (Three) Times a Day. (Patient taking differently: Take 1 tablet by mouth 3 (Three) Times a Day. For 10 days, started on 11-17-23), Disp: 30 tablet, Rfl: 0    omeprazole (priLOSEC) 40 MG capsule, Take 1 capsule by mouth 2 (Two) Times a Week., Disp: , Rfl:     sildenafil (REVATIO) 20 MG tablet, Take 3 tablets by mouth Daily As Needed., Disp: , Rfl:     triamterene-hydrochlorothiazide (DYAZIDE) 37.5-25 MG per capsule, Take 1 capsule by mouth Daily., Disp: , Rfl:     History reviewed. No pertinent family history.  Social History     Socioeconomic History    Marital status:    Tobacco Use    Smoking status: Former     Types: Cigarettes       Review of Systems:  Review of Systems   Constitutional:  Positive for activity change and fever. Negative for appetite change and chills.   HENT:  Negative for congestion, ear pain and postnasal drip.    Eyes:  Negative for photophobia and visual disturbance.   Respiratory:  Negative for apnea, cough and wheezing.    Cardiovascular:  Negative for chest pain and leg swelling.   Gastrointestinal:  Positive  "for abdominal pain and diarrhea. Negative for abdominal distention, nausea and vomiting.   Endocrine: Negative for polyphagia and polyuria.   Genitourinary:  Negative for dysuria, frequency and urgency.   Musculoskeletal:  Negative for back pain, joint swelling and neck pain.   Skin:  Negative for pallor and rash.   Allergic/Immunologic: Negative for immunocompromised state.   Neurological:  Negative for dizziness, tremors, seizures and headaches.   Hematological:  Negative for adenopathy.   Psychiatric/Behavioral:  Negative for agitation, hallucinations and self-injury.      Otherwise the 12 point review of systems is negative.    BP (!) 184/99 (BP Location: Left arm, Patient Position: Sitting)   Pulse 54   Temp 98.3 °F (36.8 °C) (Oral)   Resp 17   Ht 180.3 cm (71\")   Wt 88.5 kg (195 lb)   SpO2 99%   BMI 27.20 kg/m²   Body mass index is 27.2 kg/m².    General: Pleasantly conversant, mild distress   HEENT: PER, no icterus, normal sclerae  Cardiac: regular rhythm,  no audible rubs  Pulmonary: bilateral breath sounds, nonlabored  Abdominal: Soft, guarding, peritonitis  Neurologic: awake, alert, no obvious focal deficits  Extremities: warm, no edema  Skin: no obvious rashes nor worrisome lesions seen     CBC  Results from last 7 days   Lab Units 11/20/23  1145   WBC 10*3/mm3 10.11   HEMOGLOBIN g/dL 14.0   HEMATOCRIT % 41.5   PLATELETS 10*3/mm3 285       CMP  Results from last 7 days   Lab Units 11/20/23  1145   SODIUM mmol/L 140   POTASSIUM mmol/L 3.6   CHLORIDE mmol/L 102   CO2 mmol/L 27.0   BUN mg/dL 10   CREATININE mg/dL 0.99   CALCIUM mg/dL 9.1   BILIRUBIN mg/dL 0.8   ALK PHOS U/L 71   ALT (SGPT) U/L 19   AST (SGOT) U/L 16   GLUCOSE mg/dL 122*       Radiology  Imaging Results (Last 72 Hours)       Procedure Component Value Units Date/Time    CT Abdomen Pelvis With Contrast [[id number]] Collected: 11/20/23 1348     Updated: 11/20/23 1409    Narrative:      CT ABDOMEN PELVIS W CONTRAST    Date of Exam: " 11/20/2023 1:30 PM EST    Indication: LLQ pain.    Comparison: CT abdomen pelvis 11/25/2022    Technique: Axial CT images were obtained of the abdomen and pelvis following the uneventful intravenous administration of 85 mL Isovue-300. Reconstructed coronal and sagittal images were also obtained. Automated exposure control and iterative   construction methods were used.    Findings:    Lower thorax: Lung bases are clear.    Liver: No focal hepatic lesion. Suspected hepatic steatosis.    Gallbladder and bile ducts: Gallbladder is unremarkable. No biliary ductal dilatation.    Pancreas: No pancreatic duct dilation. No surrounding inflammation.    Spleen: Normal in size. Calcified granulomata.    Adrenal glands: No discrete adrenal nodule.    Kidneys: No hydronephrosis. Right renal cysts. Normal, symmetric excretion of contrast on delayed phase.    Urinary bladder: Unremarkable.    Reproductive organs: Prostatomegaly.    Stomach and bowel: Extensive fat stranding and free gas surrounding sigmoid diverticular disease with regional wall thickening. Secondary enteritis involving adjacent small bowel loops. No evidence of bowel obstruction. No evidence of appendicitis. Small   hiatal hernia.    Lymph nodes: No pathologically enlarged lymph nodes.    Vessels: No abdominal aortic aneurysm. Atherosclerosis.    Peritoneum and retroperitoneum: Pneumoperitoneum, some of which is distant from the perforated sigmoid diverticulitis, including antidependent gas in the anterior peritoneum. No drainable fluid collection at this time.    Soft tissues: Tiny fat-containing umbilical hernia.    Osseous structures: No suspicious osseous lesions.      Impression:      Impression:    Acute sigmoid diverticulitis with renato perforation, with some pneumoperitoneum noted to be distant from the sigmoid/source. There is extensive surrounding inflammatory change without drainable fluid collection at this time. Recommend surgery  consult.    Electronically Signed: Asher Garcia MD    11/20/2023 2:06 PM EST    Workstation ID: [id number]              Assessment:  Sigmoid diverticulitis with perforation  Hypertension  History of pulmonary hypertension    Plan:  I reviewed his CT scan of the abdomen and pelvis which demonstrates free intra-abdominal air and significant sigmoid inflammation.  He appears reasonably well and it is possible that this area has sealed.  With the amount of free air I think the best option for this gentleman is a exploratory laparotomy, abscess drainage/washout, and possible bowel resection with stoma creation.  I discussed the associated risks and benefits associated with surgical intervention including, but not limited to: bleeding, infection, injury to adjacent viscera (colon, small bowel, liver, spleen, ureters etc.), postoperative abscess, need for reintervention, need for percutaneous drainage, and medical issues from a cardiopulmonary and deep venous thrombosis standpoint.  All his questions were answered.  He understands and wishes to proceed with surgical intervention.  With free air we will get him moving towards the operating room emergently.    Hitesh Vallecillo MD  11/20/23  14:57 EST       Electronically signed by Hitesh Vallecillo MD at 11/20/23 2164

## 2023-11-21 NOTE — PROGRESS NOTES
Met with patient he is agreeable to [facility]. CM is arranging for a PCP. Fabiola ADLER, [facility]-Liaison.

## 2023-11-21 NOTE — CASE MANAGEMENT/SOCIAL WORK
Continued Stay Note  [facility]     Patient Name: Lobo Yu  MRN: [id number]  Today's Date: 11/21/2023    Admit Date: 11/20/2023    Plan: home with home health   Discharge Plan       Row Name 11/21/23 0917       Plan    Plan home with home health    Patient/Family in Agreement with Plan yes    Plan Comments I met with this patient at the bedside. CM asked if he would like me to set up home health for his new ostomy. He was agreeable and asked that I make a referral for SN to [facility], which I did and he was approved. His wife can transport at discharge. CM will follow.    Final Discharge Disposition Code 06 - home with home health care                   Discharge Codes    No documentation.                 Expected Discharge Date and Time       Expected Discharge Date Expected Discharge Time    Nov 24, 2023               Opal Anderson RN

## 2023-11-21 NOTE — NURSING NOTE
[facility] follow up for Stoma care and assessment    Surgery date: 11/20       Surgeon:  Ruth Ann    [facility] Care Outcome: Patient seen for ostomy care, assessment and education. Patient awake. Patient seen in the Medical/Surgical Floor .no family at beside.    Stoma Type: End Colostomy  Diameter/shape: round, measurements not taken  Location: LLQ  Protrusion: Budded  Stoma Characteristics: Round and Red  Peristomal skin: MARILYNN =unable to assess  Effluent Characteristics: none at this time  Effluent volume emptied:     Current pouching system: [place] 8331. Will need two piece appliance.  Accessory products used: n/a  Goal wear time: 5-7 days  Emptying frequency of appliance per day: Recommend emptying when 1/3-1/2 full.   Last appliance change: 11/21      Ostomy education provided: Colostomy diet reviewed and Ambulation promoted  Teaching provided to:  Patient    Follow up visit summary:  -Stoma viable, quiet  -Abdomen is distended. NG in place. No complaints of n/v  -Ostomy education folder provided  -Needs to ambulate multiple times a day  -Will provide lesson 1 appliance change tomorrow.       [facility] Nurse will continue to follow daily for ostomy education. Please contact with questions or if new needs arise.     Rigo Hair RN, BSN, CCRN, CWOCN  Wound, Ostomy and Continence (WOC) Department  [facility]

## 2023-11-21 NOTE — PROGRESS NOTES
[facility] Medicine Services  PROGRESS NOTE    Patient Name: Lobo Yu  : 1965  MRN: [id number]    Date of Admission: 2023  Primary Care Physician: Provider, No Known    Subjective   Subjective     CC:  Abdominal Pain    HPI:  Patient resting in bed, in a lot of pain. Wife at bedside. Patient states the Dilaudid helps but wears off quickly. He is visibly shaking from pain.      Objective   Objective     Vital Signs:   Temp:  [97.6 °F (36.4 °C)-98.5 °F (36.9 °C)] 97.6 °F (36.4 °C)  Heart Rate:  [54-93] 82  Resp:  [16-18] 18  BP: (115-184)/(80-99) 142/86  Flow (L/min):  [2] 2     Physical Exam:  Constitutional: in pain, awake, alert  HENT: NCAT, mucous membranes moist  Respiratory: Clear to auscultation bilaterally, respiratory effort normal   Cardiovascular: RRR, no murmurs, rubs, or gallops  Gastrointestinal: Positive bowel sounds, soft, +tender, nondistended, DEBO stable  Musculoskeletal: No bilateral ankle edema  Psychiatric: Appropriate affect, cooperative  Neurologic: Oriented x 3, strength symmetric in all extremities, Cranial Nerves grossly intact to confrontation, speech clear  Skin: No rashes, incision c/d/i    Results Reviewed:  LAB RESULTS:      Lab 23  0528 23  1145   WBC 12.63* 10.11   HEMOGLOBIN 13.0 14.0   HEMATOCRIT 38.7 41.5   PLATELETS 293 285   NEUTROS ABS 10.63* 7.41*   IMMATURE GRANS (ABS) 0.07* 0.06*   LYMPHS ABS 0.68* 1.30   MONOS ABS 1.24* 1.18*   EOS ABS 0.00 0.14   MCV 92.8 93.7   PROCALCITONIN  --  0.34*   LACTATE  --  1.0         Lab 23  0528 23  1145   SODIUM 140 140   POTASSIUM 4.3 3.6   CHLORIDE 101 102   CO2 28.0 27.0   ANION GAP 11.0 11.0   BUN 10 10   CREATININE 0.89 0.99   EGFR 100.0 88.9   GLUCOSE 133* 122*   CALCIUM 8.5* 9.1         Lab 23  0528 23  1145   TOTAL PROTEIN 6.1 7.4   ALBUMIN 3.6 4.0   GLOBULIN 2.5 3.4   ALT (SGPT) 18 19   AST (SGOT) 20 16   BILIRUBIN 0.6 0.8   ALK PHOS 47 71   LIPASE   --  49                 Lab 11/20/23  1525   ABO TYPING O   RH TYPING Negative   ANTIBODY SCREEN Negative         Brief Urine Lab Results  (Last result in the past 365 days)        Color   Clarity   Blood   Leuk Est   Nitrite   Protein   CREAT   Urine HCG        11/20/23 1147 Yellow   Clear   Negative   Small (1+)   Negative   Negative                   Microbiology Results Abnormal       None            CT Abdomen Pelvis With Contrast    Result Date: 11/20/2023  CT ABDOMEN PELVIS W CONTRAST Date of Exam: 11/20/2023 1:30 PM EST Indication: LLQ pain. Comparison: CT abdomen pelvis 11/25/2022 Technique: Axial CT images were obtained of the abdomen and pelvis following the uneventful intravenous administration of 85 mL Isovue-300. Reconstructed coronal and sagittal images were also obtained. Automated exposure control and iterative construction methods were used. Findings: Lower thorax: Lung bases are clear. Liver: No focal hepatic lesion. Suspected hepatic steatosis. Gallbladder and bile ducts: Gallbladder is unremarkable. No biliary ductal dilatation. Pancreas: No pancreatic duct dilation. No surrounding inflammation. Spleen: Normal in size. Calcified granulomata. Adrenal glands: No discrete adrenal nodule. Kidneys: No hydronephrosis. Right renal cysts. Normal, symmetric excretion of contrast on delayed phase. Urinary bladder: Unremarkable. Reproductive organs: Prostatomegaly. Stomach and bowel: Extensive fat stranding and free gas surrounding sigmoid diverticular disease with regional wall thickening. Secondary enteritis involving adjacent small bowel loops. No evidence of bowel obstruction. No evidence of appendicitis. Small  hiatal hernia. Lymph nodes: No pathologically enlarged lymph nodes. Vessels: No abdominal aortic aneurysm. Atherosclerosis. Peritoneum and retroperitoneum: Pneumoperitoneum, some of which is distant from the perforated sigmoid diverticulitis, including antidependent gas in the anterior  peritoneum. No drainable fluid collection at this time. Soft tissues: Tiny fat-containing umbilical hernia. Osseous structures: No suspicious osseous lesions.     Impression: Impression: Acute sigmoid diverticulitis with renato perforation, with some pneumoperitoneum noted to be distant from the sigmoid/source. There is extensive surrounding inflammatory change without drainable fluid collection at this time. Recommend surgery consult. Electronically Signed: Asher Garcia MD  11/20/2023 2:06 PM EST  Workstation ID: [id number]         Current medications:  Scheduled Meds:aspirin, 81 mg, Oral, Daily  atenolol, 50 mg, Oral, Daily  ertapenem, 1,000 mg, Intravenous, Q24H  famotidine, 40 mg, Oral, Daily  heparin (porcine), 5,000 Units, Subcutaneous, Q8H  lansoprazole, 30 mg, Oral, QAM AC  sodium chloride, 10 mL, Intravenous, Q12H      Continuous Infusions:sodium chloride, 100 mL/hr, Last Rate: 100 mL/hr (11/21/23 0656)      PRN Meds:.  acetaminophen **OR** acetaminophen **OR** [DISCONTINUED] acetaminophen    Calcium Replacement - Follow Nurse / BPA Driven Protocol    carisoprodol    HYDROcodone-acetaminophen    HYDROmorphone **AND** naloxone    labetalol    Magnesium Standard Dose Replacement - Follow Nurse / BPA Driven Protocol    Morphine **AND** naloxone    nitroglycerin    ondansetron **OR** ondansetron    oxyCODONE-acetaminophen    Phosphorus Replacement - Follow Nurse / BPA Driven Protocol    Potassium Replacement - Follow Nurse / BPA Driven Protocol    Sodium Chloride (PF)    [COMPLETED] Insert Peripheral IV **AND** sodium chloride    sodium chloride    sodium chloride    Assessment & Plan   Assessment & Plan     Active Hospital Problems    Diagnosis  POA    **Perforated diverticulum [K57.80]  Yes    Essential hypertension [I10]  Unknown    Pulmonary hypertension [I27.20]  Unknown      Resolved Hospital Problems   No resolved problems to display.        Brief Hospital Course to date:  Lobo Yu is a 58 y.o.  male with PMHx HTN and colon polyps who presents with increased abdominal pain. In ED, CT imaging with acute sigmoid diverticulitis with renato perforation with pneumoperitoneum. General Surgery, Dr Vallecillo, evaluated in ED, and took the patient urgently to the OR on 11/20/23.     This patient's problems and plans were partially entered by my partner and updated as appropriate by me 11/21/23.    All problems are new to me today.    Acute Sigmoid Diverticulitis with perforation & Pneumoperitoneum   Hx Diverticulosis   S/P Colonoscopy with polyp removal x3 11/6/23  -POD #1 s/p sigmoid colectomy with end colostomy and abscess drainage per Dr. Vallecillo  -IV ABX- Invanz- consult ID  -IVF  - ok to ambulate  -Pain Control   -Blood Cx pending      HTN  -Resume home meds   -PRN IV Labetalol     Expected Discharge Location and Transportation: home  Expected Discharge   Expected Discharge Date: 11/24/2023; Expected Discharge Time:      DVT prophylaxis:  Medical and mechanical DVT prophylaxis orders are present.     AM-PAC 6 Clicks Score (PT): 18 (11/20/23 2020)    CODE STATUS:   Code Status and Medical Interventions:   Ordered at: 11/20/23 2017     Level Of Support Discussed With:    Patient     Code Status (Patient has no pulse and is not breathing):    CPR (Attempt to Resuscitate)     Medical Interventions (Patient has pulse or is breathing):    Full Support       Leena Florentino DO  11/21/23

## 2023-11-22 LAB
ALBUMIN SERPL-MCNC: 3.5 G/DL (ref 3.5–5.2)
ALBUMIN/GLOB SERPL: 1.1 G/DL
ALP SERPL-CCNC: 52 U/L (ref 39–117)
ALT SERPL W P-5'-P-CCNC: 16 U/L (ref 1–41)
ANION GAP SERPL CALCULATED.3IONS-SCNC: 10 MMOL/L (ref 5–15)
AST SERPL-CCNC: 21 U/L (ref 1–40)
BILIRUB SERPL-MCNC: 0.6 MG/DL (ref 0–1.2)
BUN SERPL-MCNC: 14 MG/DL (ref 6–20)
BUN/CREAT SERPL: 15.1 (ref 7–25)
CALCIUM SPEC-SCNC: 8.4 MG/DL (ref 8.6–10.5)
CHLORIDE SERPL-SCNC: 100 MMOL/L (ref 98–107)
CO2 SERPL-SCNC: 28 MMOL/L (ref 22–29)
CREAT SERPL-MCNC: 0.93 MG/DL (ref 0.76–1.27)
CYTO UR: NORMAL
DEPRECATED RDW RBC AUTO: 44.3 FL (ref 37–54)
EGFRCR SERPLBLD CKD-EPI 2021: 95.8 ML/MIN/1.73
ERYTHROCYTE [DISTWIDTH] IN BLOOD BY AUTOMATED COUNT: 12.8 % (ref 12.3–15.4)
GLOBULIN UR ELPH-MCNC: 3.2 GM/DL
GLUCOSE SERPL-MCNC: 105 MG/DL (ref 65–99)
HCT VFR BLD AUTO: 42.6 % (ref 37.5–51)
HGB BLD-MCNC: 14.3 G/DL (ref 13–17.7)
LAB AP CASE REPORT: NORMAL
LAB AP CLINICAL INFORMATION: NORMAL
MCH RBC QN AUTO: 31.4 PG (ref 26.6–33)
MCHC RBC AUTO-ENTMCNC: 33.6 G/DL (ref 31.5–35.7)
MCV RBC AUTO: 93.6 FL (ref 79–97)
PATH REPORT.FINAL DX SPEC: NORMAL
PATH REPORT.GROSS SPEC: NORMAL
PLATELET # BLD AUTO: 345 10*3/MM3 (ref 140–450)
PMV BLD AUTO: 9.8 FL (ref 6–12)
POTASSIUM SERPL-SCNC: 3.9 MMOL/L (ref 3.5–5.2)
PROT SERPL-MCNC: 6.7 G/DL (ref 6–8.5)
RBC # BLD AUTO: 4.55 10*6/MM3 (ref 4.14–5.8)
SODIUM SERPL-SCNC: 138 MMOL/L (ref 136–145)
WBC NRBC COR # BLD AUTO: 13.69 10*3/MM3 (ref 3.4–10.8)

## 2023-11-22 PROCEDURE — 25010000002 HYDRALAZINE PER 20 MG: Performed by: HOSPITALIST

## 2023-11-22 PROCEDURE — 99232 SBSQ HOSP IP/OBS MODERATE 35: CPT | Performed by: HOSPITALIST

## 2023-11-22 PROCEDURE — 25010000002 FLUCONAZOLE PER 200 MG: Performed by: INTERNAL MEDICINE

## 2023-11-22 PROCEDURE — 25010000002 PROCHLORPERAZINE 10 MG/2ML SOLUTION: Performed by: PHYSICIAN ASSISTANT

## 2023-11-22 PROCEDURE — 85027 COMPLETE CBC AUTOMATED: CPT | Performed by: HOSPITALIST

## 2023-11-22 PROCEDURE — 25010000002 CHLORPROMAZINE PER 50 MG: Performed by: HOSPITALIST

## 2023-11-22 PROCEDURE — 25010000002 HEPARIN (PORCINE) PER 1000 UNITS: Performed by: SURGERY

## 2023-11-22 PROCEDURE — 25010000002 ERTAPENEM PER 500 MG: Performed by: FAMILY MEDICINE

## 2023-11-22 PROCEDURE — 80053 COMPREHEN METABOLIC PANEL: CPT | Performed by: HOSPITALIST

## 2023-11-22 RX ORDER — CHLORPROMAZINE HYDROCHLORIDE 25 MG/ML
25 INJECTION INTRAMUSCULAR ONCE
Qty: 1 ML | Refills: 0 | Status: COMPLETED | OUTPATIENT
Start: 2023-11-22 | End: 2023-11-22

## 2023-11-22 RX ORDER — PROCHLORPERAZINE EDISYLATE 5 MG/ML
10 INJECTION INTRAMUSCULAR; INTRAVENOUS EVERY 6 HOURS PRN
Status: DISCONTINUED | OUTPATIENT
Start: 2023-11-22 | End: 2023-11-27 | Stop reason: HOSPADM

## 2023-11-22 RX ORDER — POVIDONE-IODINE 10 MG/G
1 OINTMENT TOPICAL
Status: DISCONTINUED | OUTPATIENT
Start: 2023-11-22 | End: 2023-11-27 | Stop reason: HOSPADM

## 2023-11-22 RX ORDER — HYDRALAZINE HYDROCHLORIDE 20 MG/ML
10 INJECTION INTRAMUSCULAR; INTRAVENOUS EVERY 6 HOURS PRN
Status: DISCONTINUED | OUTPATIENT
Start: 2023-11-22 | End: 2023-11-27 | Stop reason: HOSPADM

## 2023-11-22 RX ADMIN — ATENOLOL 50 MG: 50 TABLET ORAL at 09:54

## 2023-11-22 RX ADMIN — TRIAMTERENE AND HYDROCHLOROTHIAZIDE 1 TABLET: 37.5; 25 TABLET ORAL at 09:53

## 2023-11-22 RX ADMIN — HYDRALAZINE HYDROCHLORIDE 10 MG: 20 INJECTION INTRAMUSCULAR; INTRAVENOUS at 14:20

## 2023-11-22 RX ADMIN — LANSOPRAZOLE 30 MG: 15 TABLET, ORALLY DISINTEGRATING, DELAYED RELEASE ORAL at 09:53

## 2023-11-22 RX ADMIN — FLUCONAZOLE 400 MG: 400 INJECTION, SOLUTION INTRAVENOUS at 11:07

## 2023-11-22 RX ADMIN — HEPARIN SODIUM 5000 UNITS: 5000 INJECTION INTRAVENOUS; SUBCUTANEOUS at 13:09

## 2023-11-22 RX ADMIN — FAMOTIDINE 40 MG: 20 TABLET, FILM COATED ORAL at 09:54

## 2023-11-22 RX ADMIN — Medication 10 ML: at 14:20

## 2023-11-22 RX ADMIN — POVIDONE-IODINE 1 APPLICATION: 100 OINTMENT TOPICAL at 13:33

## 2023-11-22 RX ADMIN — HEPARIN SODIUM 5000 UNITS: 5000 INJECTION INTRAVENOUS; SUBCUTANEOUS at 21:17

## 2023-11-22 RX ADMIN — HEPARIN SODIUM 5000 UNITS: 5000 INJECTION INTRAVENOUS; SUBCUTANEOUS at 05:40

## 2023-11-22 RX ADMIN — ERTAPENEM 1000 MG: 1 INJECTION INTRAMUSCULAR; INTRAVENOUS at 21:17

## 2023-11-22 RX ADMIN — ASPIRIN 81 MG: 81 TABLET, COATED ORAL at 10:03

## 2023-11-22 RX ADMIN — ERTAPENEM 1000 MG: 1 INJECTION INTRAMUSCULAR; INTRAVENOUS at 00:47

## 2023-11-22 RX ADMIN — CHLORPROMAZINE HYDROCHLORIDE 25 MG: 25 INJECTION INTRAMUSCULAR at 13:32

## 2023-11-22 RX ADMIN — PROCHLORPERAZINE EDISYLATE 10 MG: 5 INJECTION INTRAMUSCULAR; INTRAVENOUS at 03:24

## 2023-11-22 NOTE — CASE MANAGEMENT/SOCIAL WORK
Continued Stay Note  [facility]     Patient Name: Lobo Yu  MRN: [id number]  Today's Date: 11/22/2023    Admit Date: 11/20/2023    Plan: home with home health   Discharge Plan       Row Name 11/22/23 0900       Plan    Plan home with home health    Patient/Family in Agreement with Plan yes    Plan Comments I spoke with the patient at the bedside regarding the discharge plan. He stated that he would rather have home infusions of IV antibiotic, taught by [facility] OPAT RN at discharge, than to go to [facility] office everyday. He would need a PICC if this is the plan. A new PCP has been set up and the information was given to the patient. He was instructed to see the PCP prior to receiving  services through [facility]. His family can transport. CM will follow.    Final Discharge Disposition Code 06 - home with home health care                   Discharge Codes    No documentation.                 Expected Discharge Date and Time       Expected Discharge Date Expected Discharge Time    Nov 24, 2023               Opal Anderson RN

## 2023-11-22 NOTE — PROGRESS NOTES
[facility] Medicine Services  PROGRESS NOTE    Patient Name: Lobo Yu  : 1965  MRN: [id number]    Date of Admission: 2023  Primary Care Physician: Provider, No Known    Subjective   Subjective     CC:  Abdominal Pain    HPI:  Patient resting in bed, wife at bedside. Patient now with morphine pca that he states is helping some. He is most bothered by the NG tube and uncomfortable. He has been nauseous d/t gagging on the tube and now has continuous hiccups.       Objective   Objective     Vital Signs:   Temp:  [97.6 °F (36.4 °C)-98.3 °F (36.8 °C)] 97.6 °F (36.4 °C)  Heart Rate:  [79-91] 80  Resp:  [16-18] 16  BP: (144-159)/(91-98) 159/96     Physical Exam:  Constitutional: in pain, awake, alert  HENT: NCAT, mucous membranes moist  Respiratory: Clear to auscultation bilaterally, respiratory effort normal   Cardiovascular: RRR, no murmurs, rubs, or gallops  Gastrointestinal: negative bowel sounds, soft, nondistended, DEBO stable  Musculoskeletal: No bilateral ankle edema  Psychiatric: Appropriate affect, cooperative  Neurologic: Oriented x 3, strength symmetric in all extremities, Cranial Nerves grossly intact to confrontation, speech clear  Skin: No rashes, incision c/d/i    Results Reviewed:  LAB RESULTS:      Lab 23  0528 23  1145   WBC 13.69* 12.63* 10.11   HEMOGLOBIN 14.3 13.0 14.0   HEMATOCRIT 42.6 38.7 41.5   PLATELETS 345 293 285   NEUTROS ABS  --  10.63* 7.41*   IMMATURE GRANS (ABS)  --  0.07* 0.06*   LYMPHS ABS  --  0.68* 1.30   MONOS ABS  --  1.24* 1.18*   EOS ABS  --  0.00 0.14   MCV 93.6 92.8 93.7   PROCALCITONIN  --   --  0.34*   LACTATE  --   --  1.0         Lab 23  03123  0528 23  1145   SODIUM 138 140 140   POTASSIUM 3.9 4.3 3.6   CHLORIDE 100 101 102   CO2 28.0 28.0 27.0   ANION GAP 10.0 11.0 11.0   BUN 14 10 10   CREATININE 0.93 0.89 0.99   EGFR 95.8 100.0 88.9   GLUCOSE 105* 133* 122*   CALCIUM 8.4* 8.5*  9.1         Lab 11/22/23  0318 11/21/23  0528 11/20/23  1145   TOTAL PROTEIN 6.7 6.1 7.4   ALBUMIN 3.5 3.6 4.0   GLOBULIN 3.2 2.5 3.4   ALT (SGPT) 16 18 19   AST (SGOT) 21 20 16   BILIRUBIN 0.6 0.6 0.8   ALK PHOS 52 47 71   LIPASE  --   --  49                 Lab 11/20/23  1525   ABO TYPING O   RH TYPING Negative   ANTIBODY SCREEN Negative         Brief Urine Lab Results  (Last result in the past 365 days)        Color   Clarity   Blood   Leuk Est   Nitrite   Protein   CREAT   Urine HCG        11/20/23 1147 Yellow   Clear   Negative   Small (1+)   Negative   Negative                   Microbiology Results Abnormal       Procedure Component Value - Date/Time    Blood Culture - Blood, Arm, Right [[id number]]  (Normal) Collected: 11/20/23 1501    Lab Status: Preliminary result Specimen: Blood from Arm, Right Updated: 11/21/23 1531     Blood Culture No growth at 24 hours    Blood Culture - Blood, Arm, Right [[id number]]  (Normal) Collected: 11/20/23 1430    Lab Status: Preliminary result Specimen: Blood from Arm, Right Updated: 11/21/23 1500     Blood Culture No growth at 24 hours            Peripheral Block    Result Date: 11/21/2023  Monroe Segura CRNA     11/21/2023  8:28 AM Peripheral Block Pre-sedation assessment completed: 11/20/2023 3:08 PM Patient reassessed immediately prior to procedure Patient location during procedure: OR Start time: 11/21/2023 3:57 PM Reason for block: at surgeon's request and post-op pain management Performed by CRNA/CAA: Monroe Segura CRNASRNA: Trenton Aranan, [facility] Preanesthetic Checklist Completed: patient identified, IV checked, site marked, risks and benefits discussed, surgical consent, monitors and equipment checked, pre-op evaluation and timeout performed Prep: Pt Position: supine Sterile barriers:cap, gloves, mask and washed/disinfected hands Prep: ChloraPrep Patient monitoring: blood pressure monitoring, continuous pulse oximetry and EKG Procedure Sedation: yes  "Performed under: general Guidance:ultrasound guided Images:still images obtained, printed/placed on chart Laterality:Bilateral Block Type:TAP Injection Technique:single-shot Needle Type:short-bevel and echogenic Needle Gauge:20 G Resistance on Injection: none Medications Used: dexamethasone sodium phosphate injection - Injection  4 mg - 11/20/2023 3:57:00 PM bupivacaine PF (MARCAINE) 0.25 % injection - Injection  30 mL - 11/20/2023 3:57:00 PM Medications Preservative Free Saline:10ml Comment:Block Injection:  LA dose divided between Right and Left block Post Assessment Injection Assessment: negative aspiration for heme, incremental injection and no paresthesia on injection Patient Tolerance:comfortable throughout block Complications:no Additional Notes Subcostal TAPs A high-frequency linear transducer, with sterile cover, was placed sub-xiphoid to identify [place], right and left Rectus Abdominus Muscles (RK). The transducer was moved either right or left subcostally to identify the RK and the Transverse Abdominus Muscle (BRAR). The insertion site was prepped in sterile fashion and then localized with 2-5 ml of 1% Lidocaine. Using ultrasound-guidance, a 20-gauge B-Monge 4\" Ultraplex 360 non-stimulating echogenic needle was advanced in plane, from medial to lateral, until the tip of the needle was in the fascial plane between the RK and BRAR. 1-3ml of preservative free normal saline was used to hydro-dissect the fascial planes. After the fascial plane was verified, the local anesthetic (LA) was injected. The procedure was repeated on the opposite side for bilateral coverage. Aspiration every 5 ml to prevent intravascular injection. Injection was completed with negative aspiration of blood and negative intravascular injection. Injection pressures were normal with minimal resistance. The subcostal approach to the TAP nerve block ideally anesthetizes the intercostal nerves T6-T9. Mid-Axillary/Lateral TAPs A " "high-frequency linear transducer, with sterile cover, was placed in the midaxillary line between the ASIS and costal margin. The External Oblique Muscle (EOM), Internal Oblique Muscle (IOM), Transverse Abdominus Muscle (BRAR), and Peritoneum were identified. The insertion site was prepped in sterile fashion and then localized with 2-5 ml of 1% Lidocaine. Using ultrasound-guidance, a 20-gauge B-Monge 4\" Ultraplex 360 non-stimulating echogenic needle was advanced in plane, from medial to lateral, until the tip of the needle was in the fascial plane between the IOM and BRAR. 1-3ml of preservative free normal saline was used to hydro-dissect the fascial planes. After the fascial plane was verified, the local anesthetic (LA) was injected. The procedure was repeated on the opposite side for bilateral coverage. Aspiration every 5 ml to prevent intravascular injection. Injection was completed with negative aspiration of blood and negative intravascular injection. Injection pressures were normal with minimal resistance. Midaxillary TAPs should reach intercostal nerves T10- T11 and the subcostal nerve T12.      CT Abdomen Pelvis With Contrast    Result Date: 11/20/2023  CT ABDOMEN PELVIS W CONTRAST Date of Exam: 11/20/2023 1:30 PM EST Indication: LLQ pain. Comparison: CT abdomen pelvis 11/25/2022 Technique: Axial CT images were obtained of the abdomen and pelvis following the uneventful intravenous administration of 85 mL Isovue-300. Reconstructed coronal and sagittal images were also obtained. Automated exposure control and iterative construction methods were used. Findings: Lower thorax: Lung bases are clear. Liver: No focal hepatic lesion. Suspected hepatic steatosis. Gallbladder and bile ducts: Gallbladder is unremarkable. No biliary ductal dilatation. Pancreas: No pancreatic duct dilation. No surrounding inflammation. Spleen: Normal in size. Calcified granulomata. Adrenal glands: No discrete adrenal nodule. Kidneys: No " hydronephrosis. Right renal cysts. Normal, symmetric excretion of contrast on delayed phase. Urinary bladder: Unremarkable. Reproductive organs: Prostatomegaly. Stomach and bowel: Extensive fat stranding and free gas surrounding sigmoid diverticular disease with regional wall thickening. Secondary enteritis involving adjacent small bowel loops. No evidence of bowel obstruction. No evidence of appendicitis. Small  hiatal hernia. Lymph nodes: No pathologically enlarged lymph nodes. Vessels: No abdominal aortic aneurysm. Atherosclerosis. Peritoneum and retroperitoneum: Pneumoperitoneum, some of which is distant from the perforated sigmoid diverticulitis, including antidependent gas in the anterior peritoneum. No drainable fluid collection at this time. Soft tissues: Tiny fat-containing umbilical hernia. Osseous structures: No suspicious osseous lesions.     Impression: Impression: Acute sigmoid diverticulitis with renato perforation, with some pneumoperitoneum noted to be distant from the sigmoid/source. There is extensive surrounding inflammatory change without drainable fluid collection at this time. Recommend surgery consult. Electronically Signed: Asher Garcia MD  11/20/2023 2:06 PM EST  Workstation ID: [id number]         Current medications:  Scheduled Meds:aspirin, 81 mg, Oral, Daily  atenolol, 50 mg, Oral, Daily  chlorproMAZINE, 25 mg, Intramuscular, Once  ertapenem, 1,000 mg, Intravenous, Q24H  famotidine, 40 mg, Oral, Daily  fluconazole, 400 mg, Intravenous, Q24H  heparin (porcine), 5,000 Units, Subcutaneous, Q8H  lansoprazole, 30 mg, Oral, QAM AC  sodium chloride, 10 mL, Intravenous, Q12H  triamterene-hydrochlorothiazide, 1 tablet, Oral, Daily      Continuous Infusions:HYDROmorphone HCl-NaCl,   Pharmacy Consult,   sodium chloride, 100 mL/hr, Last Rate: 100 mL/hr (11/21/23 0656)  sodium chloride, 30 mL/hr      PRN Meds:.  acetaminophen **OR** acetaminophen **OR** [DISCONTINUED] acetaminophen    Calcium  Replacement - Follow Nurse / BPA Driven Protocol    carisoprodol    labetalol    Magnesium Standard Dose Replacement - Follow Nurse / BPA Driven Protocol    naloxone    nitroglycerin    ondansetron **OR** ondansetron    Pharmacy Consult    Phosphorus Replacement - Follow Nurse / BPA Driven Protocol    Potassium Replacement - Follow Nurse / BPA Driven Protocol    prochlorperazine    Sodium Chloride (PF)    [COMPLETED] Insert Peripheral IV **AND** sodium chloride    sodium chloride    sodium chloride    sodium chloride    Assessment & Plan   Assessment & Plan     Active Hospital Problems    Diagnosis  POA    **Perforated diverticulum [K57.80]  Yes    Essential hypertension [I10]  Unknown    Pulmonary hypertension [I27.20]  Unknown      Resolved Hospital Problems   No resolved problems to display.        Brief Hospital Course to date:  Lobo Yu is a 58 y.o. male with PMHx HTN and colon polyps who presents with increased abdominal pain. In ED, CT imaging with acute sigmoid diverticulitis with renato perforation with pneumoperitoneum. General Surgery, Dr Vallecillo, evaluated in ED, and took the patient urgently to the OR on 11/20/23.     This patient's problems and plans were partially entered by my partner and updated as appropriate by me 11/22/23.    Acute Sigmoid Diverticulitis with perforation & Pneumoperitoneum   Hx Diverticulosis   S/P Colonoscopy with polyp removal x3 11/6/23  -POD #2 s/p sigmoid colectomy with end colostomy and abscess drainage per Dr. Vallecillo  -IV ABX- Invanz 1gm IV Q24H/Fluconazole 400mg IV Q24H  - ID following  - NG in place  -IVF  - ok to ambulate  - awaiting stoma function   -Pain Control- morphine PCA  -Blood Cx pending   - prn Thorazine for hiccups     HTN  -Resume home meds   -PRN IV Labetalol     Expected Discharge Location and Transportation: home  Expected Discharge   Expected Discharge Date: 11/27/2023; Expected Discharge Time:      DVT prophylaxis:  Medical and mechanical DVT  prophylaxis orders are present.     AM-PAC 6 Clicks Score (PT): 17 (11/21/23 0800)    CODE STATUS:   Code Status and Medical Interventions:   Ordered at: 11/20/23 2017     Level Of Support Discussed With:    Patient     Code Status (Patient has no pulse and is not breathing):    CPR (Attempt to Resuscitate)     Medical Interventions (Patient has pulse or is breathing):    Full Support       Leena Florentino DO  11/22/23

## 2023-11-22 NOTE — DISCHARGE INSTR - APPOINTMENTS
Dr Alissa Tony PCP appointment made for: Tuesday Nov 28 at 1:15         Show up 30 minutes prior to visit  [address]  [phone]      [facility] follow up appointment    12/4/2023  at 1:30  At [facility] office  [phone]

## 2023-11-22 NOTE — PROGRESS NOTES
"General Surgery Post op Follow Up Note    Subjective:   Complains of soreness.  No flatus nor stoma function.    BP (!) 174/105 (BP Location: Right arm, Patient Position: Lying)   Pulse 65   Temp 98.7 °F (37.1 °C) (Axillary)   Resp 18   Ht 180.3 cm (71\")   Wt 88.5 kg (195 lb)   SpO2 93%   BMI 27.20 kg/m²     General Appearance: Mild distress  Abdomen: incision is dressed, DEBO benign    CBC  Results from last 7 days   Lab Units 11/22/23  0318   WBC 10*3/mm3 13.69*   HEMOGLOBIN g/dL 14.3   HEMATOCRIT % 42.6   PLATELETS 10*3/mm3 345       CMP/BMP  Results from last 7 days   Lab Units 11/22/23  0318   SODIUM mmol/L 138   POTASSIUM mmol/L 3.9   CHLORIDE mmol/L 100   CO2 mmol/L 28.0   BUN mg/dL 14   CREATININE mg/dL 0.93   CALCIUM mg/dL 8.4*   BILIRUBIN mg/dL 0.6   ALK PHOS U/L 52   ALT (SGPT) U/L 16   AST (SGOT) U/L 21   GLUCOSE mg/dL 105*         ASSESSMENT/PLAN:    Status post sigmoid colectomy, abscess drainage, and colostomy creation, 9/20/2023    N.p.o., NG suction, volume resuscitation, IV antibiotics  Betadine to the incision and drain exit site daily.  Mobilize    Hitesh Vallecillo MD  11/22/2023  12:38 EST   "

## 2023-11-22 NOTE — NURSING NOTE
[facility] follow up for Stoma care and assessment     Surgery date: 11/20        Surgeon:  Ruth Ann     [facility] Care Outcome: Patient seen for ostomy care, assessment and education. Patient awake. Patient seen in the Medical/Surgical Floor .spouse at beside.     Stoma Type: End Colostomy  Diameter/shape: round, 42 mm  Location: LLQ  Protrusion: Budded  Stoma Characteristics: Edematous, round and Red  Peristomal skin: Clean dry intact  Effluent Characteristics: none at this time  Effluent volume emptied:      Current pouching system: Budge new image, flat, drainable  Accessory products used: Barrier ring  Goal wear time: 5-7 days  Emptying frequency of appliance per day: Recommend emptying when 1/3-1/2 full.   Last appliance change: 11/22/2023     Follow up visit summary:  -Stoma viable, quiet.  Abdomen distended.  NG to low wall suction  -Ostomy education folder contents reviewed with wife.  -Needs to ambulate multiple times a day  --Demonstrated how to change ostomy appliance with spouse and patient.  -Appliance change lesson #1.  -We will follow-up on Monday.     Rigo Hair RN, BSN, CCRN, CWOCN  Wound, Ostomy and Continence (WOC) Department  [facility]

## 2023-11-23 LAB
ALBUMIN SERPL-MCNC: 3.1 G/DL (ref 3.5–5.2)
ALBUMIN/GLOB SERPL: 1.1 G/DL
ALP SERPL-CCNC: 44 U/L (ref 39–117)
ALT SERPL W P-5'-P-CCNC: 15 U/L (ref 1–41)
ANION GAP SERPL CALCULATED.3IONS-SCNC: 11 MMOL/L (ref 5–15)
AST SERPL-CCNC: 18 U/L (ref 1–40)
BILIRUB SERPL-MCNC: 0.6 MG/DL (ref 0–1.2)
BUN SERPL-MCNC: 13 MG/DL (ref 6–20)
BUN/CREAT SERPL: 16 (ref 7–25)
CALCIUM SPEC-SCNC: 8.4 MG/DL (ref 8.6–10.5)
CHLORIDE SERPL-SCNC: 103 MMOL/L (ref 98–107)
CO2 SERPL-SCNC: 26 MMOL/L (ref 22–29)
CREAT SERPL-MCNC: 0.81 MG/DL (ref 0.76–1.27)
DEPRECATED RDW RBC AUTO: 42.8 FL (ref 37–54)
EGFRCR SERPLBLD CKD-EPI 2021: 102.8 ML/MIN/1.73
ERYTHROCYTE [DISTWIDTH] IN BLOOD BY AUTOMATED COUNT: 12.7 % (ref 12.3–15.4)
GLOBULIN UR ELPH-MCNC: 2.8 GM/DL
GLUCOSE SERPL-MCNC: 97 MG/DL (ref 65–99)
HCT VFR BLD AUTO: 42.6 % (ref 37.5–51)
HGB BLD-MCNC: 14.5 G/DL (ref 13–17.7)
MCH RBC QN AUTO: 31.4 PG (ref 26.6–33)
MCHC RBC AUTO-ENTMCNC: 34 G/DL (ref 31.5–35.7)
MCV RBC AUTO: 92.2 FL (ref 79–97)
PLATELET # BLD AUTO: 327 10*3/MM3 (ref 140–450)
PMV BLD AUTO: 9.4 FL (ref 6–12)
POTASSIUM SERPL-SCNC: 4.1 MMOL/L (ref 3.5–5.2)
PROT SERPL-MCNC: 5.9 G/DL (ref 6–8.5)
RBC # BLD AUTO: 4.62 10*6/MM3 (ref 4.14–5.8)
SODIUM SERPL-SCNC: 140 MMOL/L (ref 136–145)
WBC NRBC COR # BLD AUTO: 10.38 10*3/MM3 (ref 3.4–10.8)

## 2023-11-23 PROCEDURE — 99231 SBSQ HOSP IP/OBS SF/LOW 25: CPT | Performed by: HOSPITALIST

## 2023-11-23 PROCEDURE — 80053 COMPREHEN METABOLIC PANEL: CPT | Performed by: HOSPITALIST

## 2023-11-23 PROCEDURE — 85027 COMPLETE CBC AUTOMATED: CPT | Performed by: HOSPITALIST

## 2023-11-23 PROCEDURE — 25810000003 SODIUM CHLORIDE 0.9 % SOLUTION: Performed by: FAMILY MEDICINE

## 2023-11-23 PROCEDURE — 25010000002 FLUCONAZOLE PER 200 MG: Performed by: INTERNAL MEDICINE

## 2023-11-23 PROCEDURE — 25010000002 HEPARIN (PORCINE) PER 1000 UNITS: Performed by: SURGERY

## 2023-11-23 PROCEDURE — 25010000002 ERTAPENEM PER 500 MG: Performed by: FAMILY MEDICINE

## 2023-11-23 PROCEDURE — 25010000002 HYDRALAZINE PER 20 MG: Performed by: HOSPITALIST

## 2023-11-23 RX ADMIN — HEPARIN SODIUM 5000 UNITS: 5000 INJECTION INTRAVENOUS; SUBCUTANEOUS at 21:20

## 2023-11-23 RX ADMIN — HEPARIN SODIUM 5000 UNITS: 5000 INJECTION INTRAVENOUS; SUBCUTANEOUS at 05:28

## 2023-11-23 RX ADMIN — FLUCONAZOLE 400 MG: 400 INJECTION, SOLUTION INTRAVENOUS at 11:13

## 2023-11-23 RX ADMIN — ATENOLOL 50 MG: 50 TABLET ORAL at 08:32

## 2023-11-23 RX ADMIN — TRIAMTERENE AND HYDROCHLOROTHIAZIDE 1 TABLET: 37.5; 25 TABLET ORAL at 08:27

## 2023-11-23 RX ADMIN — ASPIRIN 81 MG: 81 TABLET, COATED ORAL at 08:31

## 2023-11-23 RX ADMIN — Medication 10 ML: at 08:27

## 2023-11-23 RX ADMIN — HYDRALAZINE HYDROCHLORIDE 10 MG: 20 INJECTION INTRAMUSCULAR; INTRAVENOUS at 14:00

## 2023-11-23 RX ADMIN — POVIDONE-IODINE 1 APPLICATION: 100 OINTMENT TOPICAL at 11:12

## 2023-11-23 RX ADMIN — LANSOPRAZOLE 30 MG: 15 TABLET, ORALLY DISINTEGRATING, DELAYED RELEASE ORAL at 08:27

## 2023-11-23 RX ADMIN — FAMOTIDINE 40 MG: 20 TABLET, FILM COATED ORAL at 08:27

## 2023-11-23 RX ADMIN — ERTAPENEM 1000 MG: 1 INJECTION INTRAMUSCULAR; INTRAVENOUS at 21:18

## 2023-11-23 RX ADMIN — HEPARIN SODIUM 5000 UNITS: 5000 INJECTION INTRAVENOUS; SUBCUTANEOUS at 13:07

## 2023-11-23 RX ADMIN — SODIUM CHLORIDE 100 ML/HR: 9 INJECTION, SOLUTION INTRAVENOUS at 04:01

## 2023-11-23 NOTE — PLAN OF CARE
Goal Outcome Evaluation:     Problem: Adult Inpatient Plan of Care  Goal: Plan of Care Review  Outcome: Ongoing, Progressing  Goal: Patient-Specific Goal (Individualized)  Outcome: Ongoing, Progressing  Goal: Absence of Hospital-Acquired Illness or Injury  Outcome: Ongoing, Progressing  Intervention: Identify and Manage Fall Risk  Recent Flowsheet Documentation  Taken 11/23/2023 0200 by Gordon Eason RN  Safety Promotion/Fall Prevention:   nonskid shoes/slippers when out of bed   activity supervised  Taken 11/23/2023 0000 by Gordon Eason RN  Safety Promotion/Fall Prevention: safety round/check completed  Taken 11/22/2023 2200 by Gordon Eason RN  Safety Promotion/Fall Prevention:   activity supervised   assistive device/personal items within reach   clutter free environment maintained   nonskid shoes/slippers when out of bed  Taken 11/22/2023 2100 by Gordon Eason RN  Safety Promotion/Fall Prevention:   activity supervised   assistive device/personal items within reach   clutter free environment maintained   nonskid shoes/slippers when out of bed  Intervention: Prevent Skin Injury  Recent Flowsheet Documentation  Taken 11/23/2023 0200 by Gordon Eason RN  Body Position: position changed independently  Taken 11/23/2023 0000 by Gordon Eason RN  Body Position: position changed independently  Taken 11/22/2023 2200 by Gordon Eason RN  Body Position: position changed independently  Taken 11/22/2023 2100 by Gordon Eason RN  Body Position: position changed independently  Intervention: Prevent and Manage VTE (Venous Thromboembolism) Risk  Recent Flowsheet Documentation  Taken 11/22/2023 2100 by Gordon Eason RN  Activity Management: ambulated in room  Goal: Optimal Comfort and Wellbeing  Outcome: Ongoing, Progressing  Goal: Readiness for Transition of Care  Outcome: Ongoing, Progressing     Problem: Fall Injury Risk  Goal: Absence of Fall and Fall-Related Injury  Outcome: Ongoing,  Progressing  Intervention: Promote Injury-Free Environment  Recent Flowsheet Documentation  Taken 11/23/2023 0200 by Gordon Eason RN  Safety Promotion/Fall Prevention:   nonskid shoes/slippers when out of bed   activity supervised  Taken 11/23/2023 0000 by Gordon Eason RN  Safety Promotion/Fall Prevention: safety round/check completed  Taken 11/22/2023 2200 by Gordon Eason RN  Safety Promotion/Fall Prevention:   activity supervised   assistive device/personal items within reach   clutter free environment maintained   nonskid shoes/slippers when out of bed  Taken 11/22/2023 2100 by Gordon Eason RN  Safety Promotion/Fall Prevention:   activity supervised   assistive device/personal items within reach   clutter free environment maintained   nonskid shoes/slippers when out of bed     Problem: Skin Injury Risk Increased  Goal: Skin Health and Integrity  Outcome: Ongoing, Progressing  Intervention: Optimize Skin Protection  Recent Flowsheet Documentation  Taken 11/23/2023 0200 by Gordon Eason RN  Head of Bed (HOB) Positioning: HOB elevated  Taken 11/23/2023 0000 by Gordon Eason RN  Head of Bed (HOB) Positioning: HOB elevated  Taken 11/22/2023 2200 by Gordon Eason RN  Head of Bed (HOB) Positioning: HOB elevated  Taken 11/22/2023 2100 by Gordon Eason RN  Head of Bed (HOB) Positioning: HOB at 30 degrees

## 2023-11-23 NOTE — PROGRESS NOTES
[facility]  PROGRESS NOTE    Patient Name: Lobo Yu  : 1965  MRN: [id number]    Date of Admission: 2023  Primary Care Physician: Provider, No Known    Subjective   Subjective     CC:  Abdominal Pain    HPI:  Patient resting in bed, daughter at bedside. Patient still in a significant amount of pain. States he has a sore throat this morning. Hiccups still present but improved.      Objective   Objective     Vital Signs:   Temp:  [97.4 °F (36.3 °C)-98.7 °F (37.1 °C)] 97.9 °F (36.6 °C)  Heart Rate:  [65-93] 76  Resp:  [16-18] 18  BP: (150-174)/() 166/109     Physical Exam:  Constitutional: in pain, awake, alert  HENT: NCAT, mucous membranes moist  Respiratory: Clear to auscultation bilaterally, respiratory effort normal   Cardiovascular: RRR, no murmurs, rubs, or gallops  Gastrointestinal: negative bowel sounds, soft, nondistended, DEBO stable  Musculoskeletal: No bilateral ankle edema  Psychiatric: Appropriate affect, cooperative  Neurologic: Oriented x 3, strength symmetric in all extremities, Cranial Nerves grossly intact to confrontation, speech clear  Skin: No rashes, incision c/d/i    Results Reviewed:  LAB RESULTS:      Lab 23  1145   WBC 10.38 13.69* 12.63* 10.11   HEMOGLOBIN 14.5 14.3 13.0 14.0   HEMATOCRIT 42.6 42.6 38.7 41.5   PLATELETS 327 345 293 285   NEUTROS ABS  --   --  10.63* 7.41*   IMMATURE GRANS (ABS)  --   --  0.07* 0.06*   LYMPHS ABS  --   --  0.68* 1.30   MONOS ABS  --   --  1.24* 1.18*   EOS ABS  --   --  0.00 0.14   MCV 92.2 93.6 92.8 93.7   PROCALCITONIN  --   --   --  0.34*   LACTATE  --   --   --  1.0         Lab 11/23/23  0337 11/22/23  0318 11/21/23  0528 11/20/23  1145   SODIUM 140 138 140 140   POTASSIUM 4.1 3.9 4.3 3.6   CHLORIDE 103 100 101 102   CO2 26.0 28.0 28.0 27.0   ANION GAP 11.0 10.0 11.0 11.0   BUN 13 14 10 10   CREATININE 0.81 0.93 0.89 0.99   EGFR 102.8  95.8 100.0 88.9   GLUCOSE 97 105* 133* 122*   CALCIUM 8.4* 8.4* 8.5* 9.1         Lab 11/23/23  0337 11/22/23  0318 11/21/23  0528 11/20/23  1145   TOTAL PROTEIN 5.9* 6.7 6.1 7.4   ALBUMIN 3.1* 3.5 3.6 4.0   GLOBULIN 2.8 3.2 2.5 3.4   ALT (SGPT) 15 16 18 19   AST (SGOT) 18 21 20 16   BILIRUBIN 0.6 0.6 0.6 0.8   ALK PHOS 44 52 47 71   LIPASE  --   --   --  49                 Lab 11/20/23  1525   ABO TYPING O   RH TYPING Negative   ANTIBODY SCREEN Negative         Brief Urine Lab Results  (Last result in the past 365 days)        Color   Clarity   Blood   Leuk Est   Nitrite   Protein   CREAT   Urine HCG        11/20/23 1147 Yellow   Clear   Negative   Small (1+)   Negative   Negative                   Microbiology Results Abnormal       Procedure Component Value - Date/Time    Blood Culture - Blood, Arm, Right [[id number]]  (Normal) Collected: 11/20/23 1501    Lab Status: Preliminary result Specimen: Blood from Arm, Right Updated: 11/22/23 1531     Blood Culture No growth at 2 days    Blood Culture - Blood, Arm, Right [[id number]]  (Normal) Collected: 11/20/23 1430    Lab Status: Preliminary result Specimen: Blood from Arm, Right Updated: 11/22/23 1500     Blood Culture No growth at 2 days            Peripheral Block    Result Date: 11/21/2023  Monroe Segura CRNA     11/21/2023  8:28 AM Peripheral Block Pre-sedation assessment completed: 11/20/2023 3:08 PM Patient reassessed immediately prior to procedure Patient location during procedure: OR Start time: 11/21/2023 3:57 PM Reason for block: at surgeon's request and post-op pain management Performed by CRNA/CAA: Monroe Segura CRNASRNA: Trenton Arana, [facility] Preanesthetic Checklist Completed: patient identified, IV checked, site marked, risks and benefits discussed, surgical consent, monitors and equipment checked, pre-op evaluation and timeout performed Prep: Pt Position: supine Sterile barriers:cap, gloves, mask and washed/disinfected hands Prep: ChloraPrep  "Patient monitoring: blood pressure monitoring, continuous pulse oximetry and EKG Procedure Sedation: yes Performed under: general Guidance:ultrasound guided Images:still images obtained, printed/placed on chart Laterality:Bilateral Block Type:TAP Injection Technique:single-shot Needle Type:short-bevel and echogenic Needle Gauge:20 G Resistance on Injection: none Medications Used: dexamethasone sodium phosphate injection - Injection  4 mg - 11/20/2023 3:57:00 PM bupivacaine PF (MARCAINE) 0.25 % injection - Injection  30 mL - 11/20/2023 3:57:00 PM Medications Preservative Free Saline:10ml Comment:Block Injection:  LA dose divided between Right and Left block Post Assessment Injection Assessment: negative aspiration for heme, incremental injection and no paresthesia on injection Patient Tolerance:comfortable throughout block Complications:no Additional Notes Subcostal TAPs A high-frequency linear transducer, with sterile cover, was placed sub-xiphoid to identify [place], right and left Rectus Abdominus Muscles (RK). The transducer was moved either right or left subcostally to identify the RK and the Transverse Abdominus Muscle (BRAR). The insertion site was prepped in sterile fashion and then localized with 2-5 ml of 1% Lidocaine. Using ultrasound-guidance, a 20-gauge B-Monge 4\" Ultraplex 360 non-stimulating echogenic needle was advanced in plane, from medial to lateral, until the tip of the needle was in the fascial plane between the RK and BRAR. 1-3ml of preservative free normal saline was used to hydro-dissect the fascial planes. After the fascial plane was verified, the local anesthetic (LA) was injected. The procedure was repeated on the opposite side for bilateral coverage. Aspiration every 5 ml to prevent intravascular injection. Injection was completed with negative aspiration of blood and negative intravascular injection. Injection pressures were normal with minimal resistance. The subcostal approach to " "the TAP nerve block ideally anesthetizes the intercostal nerves T6-T9. Mid-Axillary/Lateral TAPs A high-frequency linear transducer, with sterile cover, was placed in the midaxillary line between the ASIS and costal margin. The External Oblique Muscle (EOM), Internal Oblique Muscle (IOM), Transverse Abdominus Muscle (BRAR), and Peritoneum were identified. The insertion site was prepped in sterile fashion and then localized with 2-5 ml of 1% Lidocaine. Using ultrasound-guidance, a 20-gauge B-Monge 4\" Ultraplex 360 non-stimulating echogenic needle was advanced in plane, from medial to lateral, until the tip of the needle was in the fascial plane between the IOM and BRAR. 1-3ml of preservative free normal saline was used to hydro-dissect the fascial planes. After the fascial plane was verified, the local anesthetic (LA) was injected. The procedure was repeated on the opposite side for bilateral coverage. Aspiration every 5 ml to prevent intravascular injection. Injection was completed with negative aspiration of blood and negative intravascular injection. Injection pressures were normal with minimal resistance. Midaxillary TAPs should reach intercostal nerves T10- T11 and the subcostal nerve T12.           Current medications:  Scheduled Meds:aspirin, 81 mg, Oral, Daily  atenolol, 50 mg, Oral, Daily  ertapenem, 1,000 mg, Intravenous, Q24H  famotidine, 40 mg, Oral, Daily  fluconazole, 400 mg, Intravenous, Q24H  heparin (porcine), 5,000 Units, Subcutaneous, Q8H  lansoprazole, 30 mg, Oral, QAM AC  povidone-iodine, 1 application , Topical, Q24H  sodium chloride, 10 mL, Intravenous, Q12H  triamterene-hydrochlorothiazide, 1 tablet, Oral, Daily      Continuous Infusions:HYDROmorphone HCl-NaCl,   Pharmacy Consult,   sodium chloride, 100 mL/hr, Last Rate: 100 mL/hr (11/23/23 0401)  sodium chloride, 30 mL/hr      PRN Meds:.  acetaminophen **OR** acetaminophen **OR** [DISCONTINUED] acetaminophen    Calcium Replacement - Follow " Nurse / BPA Driven Protocol    carisoprodol    hydrALAZINE    labetalol    Magnesium Standard Dose Replacement - Follow Nurse / BPA Driven Protocol    naloxone    nitroglycerin    ondansetron **OR** ondansetron    Pharmacy Consult    Phosphorus Replacement - Follow Nurse / BPA Driven Protocol    Potassium Replacement - Follow Nurse / BPA Driven Protocol    prochlorperazine    Sodium Chloride (PF)    [COMPLETED] Insert Peripheral IV **AND** sodium chloride    sodium chloride    sodium chloride    sodium chloride    Assessment & Plan   Assessment & Plan     Active Hospital Problems    Diagnosis  POA    **Perforated diverticulum [K57.80]  Yes    Essential hypertension [I10]  Unknown    Pulmonary hypertension [I27.20]  Unknown      Resolved Hospital Problems   No resolved problems to display.        Brief Hospital Course to date:  Lobo Yu is a 58 y.o. male with PMHx HTN and colon polyps who presents with increased abdominal pain. In ED, CT imaging with acute sigmoid diverticulitis with renato perforation with pneumoperitoneum. General Surgery, Dr Vallecillo, evaluated in ED, and took the patient urgently to the OR on 11/20/23.     This patient's problems and plans were partially entered by my partner and updated as appropriate by me 11/23/23.    Acute Sigmoid Diverticulitis with perforation & Pneumoperitoneum   Hx Diverticulosis   S/P Colonoscopy with polyp removal x3 11/6/23  -POD #3 s/p sigmoid colectomy with end colostomy and abscess drainage per Dr. Vallecillo  -IV ABX- Invanz 1gm IV Q24H/Fluconazole 400mg IV Q24H  - ID following  - NG in place  -IVF  - ok to ambulate  - awaiting stoma function   -Pain Control- Dilaudid PCA  -Blood Cx negative to date  - prn Thorazine for hiccups     HTN  -Resume home meds   -PRN IV Labetalol     Expected Discharge Location and Transportation: home  Expected Discharge   Expected Discharge Date: 11/27/2023; Expected Discharge Time:      DVT prophylaxis:  Medical and mechanical DVT  prophylaxis orders are present.     AM-PAC 6 Clicks Score (PT): 21 (11/23/23 0300)    CODE STATUS:   Code Status and Medical Interventions:   Ordered at: 11/20/23 2017     Level Of Support Discussed With:    Patient     Code Status (Patient has no pulse and is not breathing):    CPR (Attempt to Resuscitate)     Medical Interventions (Patient has pulse or is breathing):    Full Support       Leena Florentino DO  11/23/23

## 2023-11-23 NOTE — PROGRESS NOTES
INFECTIOUS DISEASE CONSULT/INITIAL HOSPITAL VISIT    Lobo Yu  1965  [id number]    Date of consult: 11/21/23    Admit date: 11/20/2023    Requesting Provider: Dr. Vallecillo  Evaluating physician: Eric Sidhu MD  Reason for Consultation: Perforated sigmoid diverticulitis   Chief Complaint: abdominal pain      Subjective   Lobo Yu is a  58 y.o.  Yr old male With a past medical history of hypertension, and colon polyps who presented On 11/20 due to worsening abdominal pain.  He had a colonoscopy with Dr. Avlaos on 11/9 for follow-up for colon polyps there were noted 5 years ago.  He had 3 polyps removed.  He was also noted to have diverticulosis.  He started having abdominal pain the day after his colonoscopy and he notified his GI physician he was initially prescribed PO flagyl with some initial improvement after taking this for about 1 week. After some initial improvement he subsequently went to [place] for vacation but started feeling worse and went to a clinic and was prescribed the same antibiotics there (ciprofloxacin and flagyl per his report).  He still continued to feel poorly when he arrived back to the [place] with abdominal pain.  He is not having any nausea or vomiting.  No renato diarrhea.    Since arrival, the patient is remained afebrile. White blood cell count was initially 10.11 but has trended up to 12.63 today. Pro-calcitonin was 0.34.  Creatinine and LFTs were within normal limits.  Urinalysis showed 1+ leukocyte esterase but was otherwise unremarkable.  Blood cultures are in progress. CT abdomen and pelvis that was done yesterday showed acute sigmoid diverticulitis with renato perforation with some pneumoperitoneum noted that was distant from the sigmoid source.  He did have extensive surrounding inflammatory change without a drainable fluid collection. He was taken to the OR yesterday by Dr. Vallecillo and underwent an exploratory laparotomy with sigmoid colectomy with  colostomy.  He underwent intra-abdominal abscess drainage with a 15 Persian Livan drain placed and appendectomy as well. He did have significant signs of inflammation consistent with perforated diverticulitis. No cultures were sent from the procedure. The patient was initially on Zosyn but has been switched to Invanz.  ID has been consulted for antibiotic recs.    Subjective:    11/22/23: Patient is still having some abdominal distention and not feeling well today.  NG tube remains in place.  Not having any severe nausea.  No ostomy output yet.  No fevers.  He denies any pruritus or rash. Leukocytosis is slightly worse today with a white blood cell count of 13.69.    11/23/23: The patient is still having some abdominal distention and No output from his ostomy yet.  NG tube remains in place.  He denies any severe nausea or vomiting.  No fevers.  Leukocytosis improved today.  No new rashes reported.  Still feeling frail/week.    Past Medical History:   Diagnosis Date    GERD (gastroesophageal reflux disease)     Hypertension        Past Surgical History:   Procedure Laterality Date    EXPLORATORY LAPAROTOMY N/A 11/20/2023    Procedure: EXPLORATORY LAPAROTOMY, SIGMOID COLECTOMY WITH COLOSTOMY CREAION, INTRA-ABDOMINAL ABSCESS DRAINAGE, APPENDECTOMY;  Surgeon: Hitesh Vallecillo MD;  Location: [facility];  Service: General;  Laterality: N/A;    NEPHROLITHOTOMY      TONSILLECTOMY         Pediatric History   Patient Parents    Not on file     Other Topics Concern    Not on file   Social History Narrative    Not on file       family history is not on file.    No Known Allergies      There is no immunization history on file for this patient.    Medication:    Current Facility-Administered Medications:     acetaminophen (TYLENOL) tablet 650 mg, 650 mg, Oral, Q4H PRN **OR** acetaminophen (TYLENOL) 160 MG/5ML oral solution 650 mg, 650 mg, Oral, Q4H PRN **OR** [DISCONTINUED] acetaminophen (TYLENOL) suppository 650 mg, 650 mg,  Rectal, Q4H PRN, Grace Markham DO    aspirin EC tablet 81 mg, 81 mg, Oral, Daily, Hitesh Vallecillo MD, 81 mg at 11/23/23 0831    atenolol (TENORMIN) tablet 50 mg, 50 mg, Oral, Daily, Hitesh Vallecillo MD, 50 mg at 11/23/23 0832    Calcium Replacement - Follow Nurse / BPA Driven Protocol, , Does not apply, PRN, Grace Markham DO    carisoprodol (SOMA) tablet 350 mg, 350 mg, Oral, Q8H PRN, Hitesh Vallecillo MD    ertapenem (INVanz) 1,000 mg in sodium chloride 0.9 % 100 mL IVPB, 1,000 mg, Intravenous, Q24H, Grace Markham DO, Last Rate: 200 mL/hr at 11/22/23 2117, 1,000 mg at 11/22/23 2117    famotidine (PEPCID) tablet 40 mg, 40 mg, Oral, Daily, Hitesh Vallecillo MD, 40 mg at 11/23/23 0827    fluconazole (DIFLUCAN) IVPB 400 mg, 400 mg, Intravenous, Q24H, Eric Sidhu MD, Last Rate: 100 mL/hr at 11/23/23 1113, 400 mg at 11/23/23 1113    heparin (porcine) 5000 UNIT/ML injection 5,000 Units, 5,000 Units, Subcutaneous, Q8H, Hitesh Vallecillo MD, 5,000 Units at 11/23/23 0528    hydrALAZINE (APRESOLINE) injection 10 mg, 10 mg, Intravenous, Q6H PRN, Leena Florentino DO, 10 mg at 11/22/23 1420    HYDROmorphone (DILAUDID) PCA 0.2 mg/ml 50 mL syringe, , Intravenous, Continuous, Maximiliano Jimenez MD, Currently Infusing at 11/22/23 1400    labetalol (NORMODYNE,TRANDATE) injection 10 mg, 10 mg, Intravenous, Q4H PRN, Grace Markham DO    lansoprazole (PREVACID SOLUTAB) disintegrating tablet Tablet Delayed Release Dispersible 30 mg, 30 mg, Oral, QAM AC, Hitesh Vallecillo MD, 30 mg at 11/23/23 0827    Magnesium Standard Dose Replacement - Follow Nurse / BPA Driven Protocol, , Does not apply, PRN, Grace Markham,     naloxone (NARCAN) injection 0.1 mg, 0.1 mg, Intravenous, Q5 Min PRN, Maximiliano Jimenez MD    nitroglycerin (NITROSTAT) SL tablet 0.4 mg, 0.4 mg, Sublingual, Q5 Min PRN, Grace Markham,     ondansetron (ZOFRAN) tablet 4 mg, 4 mg, Oral, Q6H PRN  **OR** ondansetron (ZOFRAN) injection 4 mg, 4 mg, Intravenous, Q6H PRN, Grace Markham DO, 4 mg at 11/21/23 2002    pharmacy to dc prn opiods when PCA is hooked up, , Does not apply, Continuous PRN, Maximiliano Jimenez MD    phenol (CHLORASEPTIC) 1.4 % liquid 1 spray, 1 spray, Mouth/Throat, Q2H PRN, Leena Florentino DO    Phosphorus Replacement - Follow Nurse / BPA Driven Protocol, , Does not apply, PRN, Grace Markham DO    Potassium Replacement - Follow Nurse / BPA Driven Protocol, , Does not apply, PRN, Grace Markham DO    povidone-iodine (BETADINE) ointment 1 application , 1 application , Topical, Q24H, Hitesh Vallecillo MD, 1 application  at 11/23/23 1112    prochlorperazine (COMPAZINE) injection 10 mg, 10 mg, Intravenous, Q6H PRN, Richard Flores PA-C, 10 mg at 11/22/23 0324    Sodium Chloride (PF) 0.9 % 10 mL, 10 mL, Intravenous, PRN, Hitesh Vallecillo MD    [COMPLETED] Insert Peripheral IV, , , Once **AND** sodium chloride 0.9 % flush 10 mL, 10 mL, Intravenous, PRN, Hitesh Vallecillo MD    sodium chloride 0.9 % flush 10 mL, 10 mL, Intravenous, Q12H, Grace Markham DO, 10 mL at 11/23/23 0827    sodium chloride 0.9 % flush 10 mL, 10 mL, Intravenous, PRN, Grace Markham DO, 10 mL at 11/22/23 1420    sodium chloride 0.9 % infusion 40 mL, 40 mL, Intravenous, PRN, Grace Markham DO    sodium chloride 0.9 % infusion, 100 mL/hr, Intravenous, Continuous, Grace Markham DO, Last Rate: 100 mL/hr at 11/23/23 0401, 100 mL/hr at 11/23/23 0401    sodium chloride 0.9 % infusion, 30 mL/hr, Intravenous, Continuous PRN, Maximiliano Jimenez MD    triamterene-hydrochlorothiazide (MAXZIDE-25) 37.5-25 MG per tablet 1 tablet, 1 tablet, Oral, Daily, Leena Florentino DO, 1 tablet at 11/23/23 0890    Please refer to the medical record for a full medication list    Review of Systems:    As above    Physical Exam:   Vital Signs   Temp:  [97.4 °F (36.3 °C)-98.1 °F (36.7 °C)] 97.9 °F  "(36.6 °C)  Heart Rate:  [76-93] 76  Resp:  [16-18] 18  BP: (150-172)/() 166/109    Temp  Min: 97.4 °F (36.3 °C)  Max: 98.1 °F (36.7 °C)  BP  Min: 150/105  Max: 172/102  Pulse  Min: 76  Max: 93  Resp  Min: 16  Max: 18  SpO2  Min: 94 %  Max: 96 %    Blood pressure (!) 166/109, pulse 76, temperature 97.9 °F (36.6 °C), temperature source Axillary, resp. rate 18, height 180.3 cm (71\"), weight 88.5 kg (195 lb), SpO2 96%.  GENERAL: Somnolent but wakes up easily.  No acute distress. Appears stated age.    HEENT:  Normocephalic, atraumatic.  No external oral lesions noted.   EYES:  No conjunctival injection. No icterus.   HEART: RRR, no murmur  LUNGS: Clear to auscultation and percussion. No respiratory distress, no use of accessory muscles.  ABDOMEN: Soft, Moderately distended, colostomy in place in the left upper quadrant. Slightly less tentative outpatient. Right upper quadrant drain in place with a significant amount of serosanguineous drainage. No appreciable HSM.  No bowel sounds heard  GENITAL: no Pitt catheter  SKIN: no generalized rashes.  No peripheral stigmata of infective endocarditis noted.  PSYCHIATRIC: cooperative.  Appropriate mood and affect  EXT:  No cellulitic change.  NEURO: On the limbal wakes up easily.  Oriented ×4.  Normal speech and cognition    Results Review:   I reviewed the patient's new clinical results.  I reviewed the patient's new imaging results and agree with the interpretation.  I reviewed the patient's other test results and agree with the interpretation    Results from last 7 days   Lab Units 11/23/23  0337 11/22/23  0318 11/21/23  0528   WBC 10*3/mm3 10.38 13.69* 12.63*   HEMOGLOBIN g/dL 14.5 14.3 13.0   HEMATOCRIT % 42.6 42.6 38.7   PLATELETS 10*3/mm3 327 345 293     Results from last 7 days   Lab Units 11/23/23  0337   SODIUM mmol/L 140   POTASSIUM mmol/L 4.1   CHLORIDE mmol/L 103   CO2 mmol/L 26.0   BUN mg/dL 13   CREATININE mg/dL 0.81   GLUCOSE mg/dL 97   CALCIUM mg/dL 8.4* " "    Results from last 7 days   Lab Units 11/23/23  0337   ALK PHOS U/L 44   BILIRUBIN mg/dL 0.6   ALT (SGPT) U/L 15   AST (SGOT) U/L 18                 Results from last 7 days   Lab Units 11/20/23  1145   LACTATE mmol/L 1.0     Estimated Creatinine Clearance: 126 mL/min (by C-G formula based on SCr of 0.81 mg/dL).     Procalitonin Results:      Lab 11/20/23  1145   PROCALCITONIN 0.34*      Brief Urine Lab Results  (Last result in the past 365 days)        Color   Clarity   Blood   Leuk Est   Nitrite   Protein   CREAT   Urine HCG        11/20/23 1147 Yellow   Clear   Negative   Small (1+)   Negative   Negative                  No results found for: \"SITE\", \"ALLENTEST\", \"PHART\", \"YJS2XYX\", \"PO2ART\", \"WUT9LEL\", \"BASEEXCESS\", \"A2LWFFVL\", \"HGBBG\", \"HCTABG\", \"OXYHEMOGLOBI\", \"METHHGBN\", \"CARBOXYHGB\", \"CO2CT\", \"BAROMETRIC\", \"MODALITY\", \"FIO2\"     Microbiology:  Blood culture ×2: No growth to date      Radiology:  Imaging Results (Last 72 Hours)       Procedure Component Value Units Date/Time    CT Abdomen Pelvis With Contrast [[id number]] Collected: 11/20/23 1348     Updated: 11/20/23 1409    Narrative:      CT ABDOMEN PELVIS W CONTRAST    Date of Exam: 11/20/2023 1:30 PM EST    Indication: LLQ pain.    Comparison: CT abdomen pelvis 11/25/2022    Technique: Axial CT images were obtained of the abdomen and pelvis following the uneventful intravenous administration of 85 mL Isovue-300. Reconstructed coronal and sagittal images were also obtained. Automated exposure control and iterative   construction methods were used.    Findings:    Lower thorax: Lung bases are clear.    Liver: No focal hepatic lesion. Suspected hepatic steatosis.    Gallbladder and bile ducts: Gallbladder is unremarkable. No biliary ductal dilatation.    Pancreas: No pancreatic duct dilation. No surrounding inflammation.    Spleen: Normal in size. Calcified granulomata.    Adrenal glands: No discrete adrenal nodule.    Kidneys: No hydronephrosis. Right " renal cysts. Normal, symmetric excretion of contrast on delayed phase.    Urinary bladder: Unremarkable.    Reproductive organs: Prostatomegaly.    Stomach and bowel: Extensive fat stranding and free gas surrounding sigmoid diverticular disease with regional wall thickening. Secondary enteritis involving adjacent small bowel loops. No evidence of bowel obstruction. No evidence of appendicitis. Small   hiatal hernia.    Lymph nodes: No pathologically enlarged lymph nodes.    Vessels: No abdominal aortic aneurysm. Atherosclerosis.    Peritoneum and retroperitoneum: Pneumoperitoneum, some of which is distant from the perforated sigmoid diverticulitis, including antidependent gas in the anterior peritoneum. No drainable fluid collection at this time.    Soft tissues: Tiny fat-containing umbilical hernia.    Osseous structures: No suspicious osseous lesions.      Impression:      Impression:    Acute sigmoid diverticulitis with renato perforation, with some pneumoperitoneum noted to be distant from the sigmoid/source. There is extensive surrounding inflammatory change without drainable fluid collection at this time. Recommend surgery consult.    Electronically Signed: Asher Garcia MD    11/20/2023 2:06 PM EST    Workstation ID: [id number]            IMPRESSION:     Problems:  Perforated sigmoid diverticulitis with intra-abdominal abscess-status post exploratory laparotomy, sigmoid colectomy with colostomy creation, and drainage of intra-abdominal abscess with drain placement and incidental appendectomy on 11/20/23 by Dr. Vallecillo  Leukocytosis-Likely secondary to postoperative leukemoid reaction.  Now improved  Hypertension    RECOMMENDATIONS:    -Continue to monitor CBC and CMP closely for now  -Follow pending blood cultures-No growth to date  -Surgery is following.  Awaiting stoma function.  Plan to start mobilizing the patient today  -Continue Invanz 1 g IV every 24 hours  -Continue fluconazole 400 mg IV every 24 hours.  Can be changed to by mouth when he is eating/tolerating by mouth meds  -Plan for PICC line placement Tomorrow morning      He is not ready for discharge yet.  Need to start mobilizing him and make sure that he does not require physical therapy.  Additionally awaiting stoma function.  Still has an NG tube in place and not eating yet.    Tentative Antibiotic plan at discharge    UM/ROMA:  Ertapenem 1g IV q24h. Plan to continue this antibiotic at least until 12/4/23.  Fluconazole 400 mg by mouth daily.  Plan to continue this antifungal at least until 12/4/23.  Weekly CBC, CMP, CRP  Change the PICC line dressing weekly with a Biopatch or Tegaderm CHG gel dressing   Follow-up in my clinic on 12/4/23.  Please fax a copy of my orders to [facility] at [phone] and call [phone] with final discharge plans     I discussed in length with the patient and multiple family members at bedside today    Thank you for asking me to see Lobo Yu.     Complex MDM    Eric Sidhu MD  11/23/2023

## 2023-11-23 NOTE — PROGRESS NOTES
"General Surgery Post op Follow Up Note    Subjective:   Resting comfortably.  Spoke to his daughters today.    BP (!) 166/109 (BP Location: Right arm, Patient Position: Lying)   Pulse 76   Temp 97.9 °F (36.6 °C) (Axillary)   Resp 18   Ht 180.3 cm (71\")   Wt 88.5 kg (195 lb)   SpO2 96%   BMI 27.20 kg/m²     General Appearance: Mild distress  Abdomen: incision is clean and dry, stoma viable    CBC  Results from last 7 days   Lab Units 11/23/23  0337   WBC 10*3/mm3 10.38   HEMOGLOBIN g/dL 14.5   HEMATOCRIT % 42.6   PLATELETS 10*3/mm3 327       CMP/BMP  Results from last 7 days   Lab Units 11/23/23  0337   SODIUM mmol/L 140   POTASSIUM mmol/L 4.1   CHLORIDE mmol/L 103   CO2 mmol/L 26.0   BUN mg/dL 13   CREATININE mg/dL 0.81   CALCIUM mg/dL 8.4*   BILIRUBIN mg/dL 0.6   ALK PHOS U/L 44   ALT (SGPT) U/L 15   AST (SGOT) U/L 18   GLUCOSE mg/dL 97         ASSESSMENT/PLAN:    Postoperative day 3 - partial colectomy and colostomy creation with abscess drainage    Awaiting GI function.  Continue volume resuscitation and IV antibiotics.  Mobilize.    Hitesh Vallecillo MD  11/23/2023  10:58 EST   "

## 2023-11-23 NOTE — PROGRESS NOTES
INFECTIOUS DISEASE CONSULT/INITIAL HOSPITAL VISIT    Lobo Yu  1965  [id number]    Date of consult: 11/21/23    Admit date: 11/20/2023    Requesting Provider: Dr. Vallecillo  Evaluating physician: Eric Sidhu MD  Reason for Consultation: Perforated sigmoid diverticulitis   Chief Complaint: abdominal pain      Subjective   Lobo Yu is a  58 y.o.  Yr old male With a past medical history of hypertension, and colon polyps who presented On 11/20 due to worsening abdominal pain.  He had a colonoscopy with Dr. Avalos on 11/9 for follow-up for colon polyps there were noted 5 years ago.  He had 3 polyps removed.  He was also noted to have diverticulosis.  He started having abdominal pain the day after his colonoscopy and he notified his GI physician he was initially prescribed PO flagyl with some initial improvement after taking this for about 1 week. After some initial improvement he subsequently went to [place] for vacation but started feeling worse and went to a clinic and was prescribed the same antibiotics there (ciprofloxacin and flagyl per his report).  He still continued to feel poorly when he arrived back to the [place] with abdominal pain.  He is not having any nausea or vomiting.  No renato diarrhea.    Since arrival, the patient is remained afebrile. White blood cell count was initially 10.11 but has trended up to 12.63 today. Pro-calcitonin was 0.34.  Creatinine and LFTs were within normal limits.  Urinalysis showed 1+ leukocyte esterase but was otherwise unremarkable.  Blood cultures are in progress. CT abdomen and pelvis that was done yesterday showed acute sigmoid diverticulitis with renato perforation with some pneumoperitoneum noted that was distant from the sigmoid source.  He did have extensive surrounding inflammatory change without a drainable fluid collection. He was taken to the OR yesterday by Dr. Vallecillo and underwent an exploratory laparotomy with sigmoid colectomy with  colostomy.  He underwent intra-abdominal abscess drainage with a 15 Australian Livan drain placed and appendectomy as well. He did have significant signs of inflammation consistent with perforated diverticulitis. No cultures were sent from the procedure. The patient was initially on Zosyn but has been switched to Invanz.  ID has been consulted for antibiotic recs.    Subjective:    11/22/23: Patient is still having some abdominal distention and not feeling well today.  NG tube remains in place.  Not having any severe nausea.  No ostomy output yet.  No fevers.  He denies any pruritus or rash. Leukocytosis is slightly worse today with a white blood cell count of 13.69.    Past Medical History:   Diagnosis Date    GERD (gastroesophageal reflux disease)     Hypertension        Past Surgical History:   Procedure Laterality Date    EXPLORATORY LAPAROTOMY N/A 11/20/2023    Procedure: EXPLORATORY LAPAROTOMY, SIGMOID COLECTOMY WITH COLOSTOMY CREAION, INTRA-ABDOMINAL ABSCESS DRAINAGE, APPENDECTOMY;  Surgeon: Hitesh Vallecillo MD;  Location: [facility];  Service: General;  Laterality: N/A;    NEPHROLITHOTOMY      TONSILLECTOMY         Pediatric History   Patient Parents    Not on file     Other Topics Concern    Not on file   Social History Narrative    Not on file       family history is not on file.    No Known Allergies      There is no immunization history on file for this patient.    Medication:    Current Facility-Administered Medications:     acetaminophen (TYLENOL) tablet 650 mg, 650 mg, Oral, Q4H PRN **OR** acetaminophen (TYLENOL) 160 MG/5ML oral solution 650 mg, 650 mg, Oral, Q4H PRN **OR** [DISCONTINUED] acetaminophen (TYLENOL) suppository 650 mg, 650 mg, Rectal, Q4H PRN, Grace Markham DO    aspirin EC tablet 81 mg, 81 mg, Oral, Daily, Hitesh Vallecillo MD, 81 mg at 11/22/23 1003    atenolol (TENORMIN) tablet 50 mg, 50 mg, Oral, Daily, Hitesh Vallecillo MD, 50 mg at 11/22/23 0954    Calcium  Replacement - Follow Nurse / BPA Driven Protocol, , Does not apply, PRN, Grace Markham, DO    carisoprodol (SOMA) tablet 350 mg, 350 mg, Oral, Q8H PRN, Hitesh Vallecillo MD    ertapenem (INVanz) 1,000 mg in sodium chloride 0.9 % 100 mL IVPB, 1,000 mg, Intravenous, Q24H, Grace Markham DO, Last Rate: 200 mL/hr at 11/22/23 0047, 1,000 mg at 11/22/23 0047    famotidine (PEPCID) tablet 40 mg, 40 mg, Oral, Daily, Hitesh Vallecillo MD, 40 mg at 11/22/23 0954    fluconazole (DIFLUCAN) IVPB 400 mg, 400 mg, Intravenous, Q24H, Eric Sidhu MD, Last Rate: 100 mL/hr at 11/22/23 1107, 400 mg at 11/22/23 1107    heparin (porcine) 5000 UNIT/ML injection 5,000 Units, 5,000 Units, Subcutaneous, Q8H, Hitesh Vallecillo MD, 5,000 Units at 11/22/23 1309    hydrALAZINE (APRESOLINE) injection 10 mg, 10 mg, Intravenous, Q6H PRN, Leena Florentino DO, 10 mg at 11/22/23 1420    HYDROmorphone (DILAUDID) PCA 0.2 mg/ml 50 mL syringe, , Intravenous, Continuous, Maximiliano Jimenez MD, Currently Infusing at 11/22/23 1400    labetalol (NORMODYNE,TRANDATE) injection 10 mg, 10 mg, Intravenous, Q4H PRN, Grace Markham DO    lansoprazole (PREVACID SOLUTAB) disintegrating tablet Tablet Delayed Release Dispersible 30 mg, 30 mg, Oral, QAM AC, Hitesh Vallecillo MD, 30 mg at 11/22/23 0953    Magnesium Standard Dose Replacement - Follow Nurse / BPA Driven Protocol, , Does not apply, PRN, Grace Markham DO    naloxone (NARCAN) injection 0.1 mg, 0.1 mg, Intravenous, Q5 Min PRN, Maximiliano Jimenez MD    nitroglycerin (NITROSTAT) SL tablet 0.4 mg, 0.4 mg, Sublingual, Q5 Min PRN, Grace Markham,     ondansetron (ZOFRAN) tablet 4 mg, 4 mg, Oral, Q6H PRN **OR** ondansetron (ZOFRAN) injection 4 mg, 4 mg, Intravenous, Q6H PRN, Grace Markham DO, 4 mg at 11/21/23 2002    pharmacy to [place] prn opiods when PCA is hooked up, , Does not apply, Continuous PRN, Maximiliano Jimenez MD    Phosphorus Replacement - Follow  Nurse / BPA Driven Protocol, , Does not apply, PRNRashi Olivia D, DO    Potassium Replacement - Follow Nurse / BPA Driven Protocol, , Does not apply, Rashi VEGAS Olivia D, DO    povidone-iodine (BETADINE) ointment 1 application , 1 application , Topical, Q24H, Hitesh Vallecillo MD, 1 application  at 11/22/23 1333    prochlorperazine (COMPAZINE) injection 10 mg, 10 mg, Intravenous, Q6H PRN, Richard Flores PA-C, 10 mg at 11/22/23 0324    Sodium Chloride (PF) 0.9 % 10 mL, 10 mL, Intravenous, PRN, Hitesh Vallecillo MD    [COMPLETED] Insert Peripheral IV, , , Once **AND** sodium chloride 0.9 % flush 10 mL, 10 mL, Intravenous, PRN, Hitesh Vallecillo MD    sodium chloride 0.9 % flush 10 mL, 10 mL, Intravenous, Q12H, Grace Markham, , 10 mL at 11/21/23 2002    sodium chloride 0.9 % flush 10 mL, 10 mL, Intravenous, PRN, Grace Markham DO, 10 mL at 11/22/23 1420    sodium chloride 0.9 % infusion 40 mL, 40 mL, Intravenous, PRN, Grace Markham,     sodium chloride 0.9 % infusion, 100 mL/hr, Intravenous, Continuous, Grace Markham DO, Last Rate: 100 mL/hr at 11/21/23 0656, 100 mL/hr at 11/21/23 0656    sodium chloride 0.9 % infusion, 30 mL/hr, Intravenous, Continuous PRN, Maximiliano Jimenez MD    triamterene-hydrochlorothiazide (MAXZIDE-25) 37.5-25 MG per tablet 1 tablet, 1 tablet, Oral, Daily, Leena Florentino DO, 1 tablet at 11/22/23 0953    Please refer to the medical record for a full medication list    Review of Systems:    As above    Physical Exam:   Vital Signs   Temp:  [97.6 °F (36.4 °C)-98.7 °F (37.1 °C)] 98.7 °F (37.1 °C)  Heart Rate:  [65-83] 65  Resp:  [16-18] 18  BP: (144-174)/() 172/102    Temp  Min: 97.6 °F (36.4 °C)  Max: 98.7 °F (37.1 °C)  BP  Min: 144/97  Max: 174/105  Pulse  Min: 65  Max: 83  Resp  Min: 16  Max: 18  SpO2  Min: 93 %  Max: 95 %    Blood pressure (!) 172/102, pulse 65, temperature 98.7 °F (37.1 °C), temperature source Axillary, resp.  "rate 18, height 180.3 cm (71\"), weight 88.5 kg (195 lb), SpO2 93%.  GENERAL: Awake and alert, in Mild distress. Appears stated age.    HEENT:  Normocephalic, atraumatic.  No external oral lesions noted.   EYES:  No conjunctival injection. No icterus.   HEART: RRR, no murmur  LUNGS: Clear to auscultation and percussion. No respiratory distress, no use of accessory muscles.  ABDOMEN: Soft, Moderately distended, colostomy in place in the left upper quadrant. Diffusely tender to palpation. Right upper quadrant drain in place with serosanguineous drainage. No appreciable HSM.  No bowel sounds heard  GENITAL: no Pitt catheter  SKIN: no generalized rashes.  No peripheral stigmata of infective endocarditis noted.  PSYCHIATRIC: cooperative.  Appropriate mood and affect  EXT:  No cellulitic change.  NEURO: awake alert and oriented ×4.  Normal speech and cognition    Results Review:   I reviewed the patient's new clinical results.  I reviewed the patient's new imaging results and agree with the interpretation.  I reviewed the patient's other test results and agree with the interpretation    Results from last 7 days   Lab Units 11/22/23  0318 11/21/23  0528 11/20/23  1145   WBC 10*3/mm3 13.69* 12.63* 10.11   HEMOGLOBIN g/dL 14.3 13.0 14.0   HEMATOCRIT % 42.6 38.7 41.5   PLATELETS 10*3/mm3 345 293 285     Results from last 7 days   Lab Units 11/22/23  0318   SODIUM mmol/L 138   POTASSIUM mmol/L 3.9   CHLORIDE mmol/L 100   CO2 mmol/L 28.0   BUN mg/dL 14   CREATININE mg/dL 0.93   GLUCOSE mg/dL 105*   CALCIUM mg/dL 8.4*     Results from last 7 days   Lab Units 11/22/23  0318   ALK PHOS U/L 52   BILIRUBIN mg/dL 0.6   ALT (SGPT) U/L 16   AST (SGOT) U/L 21                 Results from last 7 days   Lab Units 11/20/23  1145   LACTATE mmol/L 1.0     Estimated Creatinine Clearance: 109.7 mL/min (by C-G formula based on SCr of 0.93 mg/dL).     Procalitonin Results:      Lab 11/20/23  1145   PROCALCITONIN 0.34*      Brief Urine Lab " "Results  (Last result in the past 365 days)        Color   Clarity   Blood   Leuk Est   Nitrite   Protein   CREAT   Urine HCG        11/20/23 1147 Yellow   Clear   Negative   Small (1+)   Negative   Negative                  No results found for: \"SITE\", \"ALLENTEST\", \"PHART\", \"MNM4TNV\", \"PO2ART\", \"WXL9CDR\", \"BASEEXCESS\", \"D1MMVZGA\", \"HGBBG\", \"HCTABG\", \"OXYHEMOGLOBI\", \"METHHGBN\", \"CARBOXYHGB\", \"CO2CT\", \"BAROMETRIC\", \"MODALITY\", \"FIO2\"     Microbiology:  Blood culture ×2: In progress      Radiology:  Imaging Results (Last 72 Hours)       Procedure Component Value Units Date/Time    CT Abdomen Pelvis With Contrast [[id number]] Collected: 11/20/23 1348     Updated: 11/20/23 1409    Narrative:      CT ABDOMEN PELVIS W CONTRAST    Date of Exam: 11/20/2023 1:30 PM EST    Indication: LLQ pain.    Comparison: CT abdomen pelvis 11/25/2022    Technique: Axial CT images were obtained of the abdomen and pelvis following the uneventful intravenous administration of 85 mL Isovue-300. Reconstructed coronal and sagittal images were also obtained. Automated exposure control and iterative   construction methods were used.    Findings:    Lower thorax: Lung bases are clear.    Liver: No focal hepatic lesion. Suspected hepatic steatosis.    Gallbladder and bile ducts: Gallbladder is unremarkable. No biliary ductal dilatation.    Pancreas: No pancreatic duct dilation. No surrounding inflammation.    Spleen: Normal in size. Calcified granulomata.    Adrenal glands: No discrete adrenal nodule.    Kidneys: No hydronephrosis. Right renal cysts. Normal, symmetric excretion of contrast on delayed phase.    Urinary bladder: Unremarkable.    Reproductive organs: Prostatomegaly.    Stomach and bowel: Extensive fat stranding and free gas surrounding sigmoid diverticular disease with regional wall thickening. Secondary enteritis involving adjacent small bowel loops. No evidence of bowel obstruction. No evidence of appendicitis. Small   hiatal " hernia.    Lymph nodes: No pathologically enlarged lymph nodes.    Vessels: No abdominal aortic aneurysm. Atherosclerosis.    Peritoneum and retroperitoneum: Pneumoperitoneum, some of which is distant from the perforated sigmoid diverticulitis, including antidependent gas in the anterior peritoneum. No drainable fluid collection at this time.    Soft tissues: Tiny fat-containing umbilical hernia.    Osseous structures: No suspicious osseous lesions.      Impression:      Impression:    Acute sigmoid diverticulitis with renato perforation, with some pneumoperitoneum noted to be distant from the sigmoid/source. There is extensive surrounding inflammatory change without drainable fluid collection at this time. Recommend surgery consult.    Electronically Signed: Asher Garcia MD    11/20/2023 2:06 PM EST    Workstation ID: [id number]            IMPRESSION:     Problems:  Perforated sigmoid diverticulitis with intra-abdominal abscess-status post exploratory laparotomy, sigmoid colectomy with colostomy creation, and drainage of intra-abdominal abscess with drain placement and incidental appendectomy on 11/20/23 by Dr. Vallecillo  Leukocytosis-Could be secondary to postoperative leukemoid reaction.  Intra-abdominal infection could be playing a role as well.  Slightly worse today.  We will continue to monitor closely.  Hypertension    RECOMMENDATIONS:    -Continue to monitor CBC and CMP closely for now  -Follow pending blood cultures-No growth to date  -Surgery is following.  Awaiting stoma function.  Plan to start mobilizing the patient today  -Continue Invanz 1 g IV every 24 hours  -Continue fluconazole 400 mg IV every 24 hours. Can be changed to by mouth when he is eating/tolerating by mouth meds  -Consider PICC line placement soon      He is not ready for discharge yet.  Need to start mobilizing him and make sure that he does not require physical therapy.  Additionally awaiting stoma function.  Still has an NG tube in place  and not eating yet.    Tentative Antibiotic plan at discharge    UM/ROMA:  Ertapenem 1g IV q24h. Plan to continue this antibiotic at least until 12/4/23.  Fluconazole 400 mg by mouth daily.  Plan to continue this antifungal at least until 12/4/23.  Weekly CBC, CMP, CRP  Change the PICC line dressing weekly with a Biopatch or Tegaderm CHG gel dressing   Follow-up in my clinic on 12/4/23.  Please fax a copy of my orders to [facility] at [phone] and call [phone] with final discharge plans     I discussed in length with the patient and his wife at bedside today.    Thank you for asking me to see Lobo Yu.     Complex MDM    Eric Sidhu MD  11/22/2023

## 2023-11-24 LAB
ALBUMIN SERPL-MCNC: 3.1 G/DL (ref 3.5–5.2)
ALBUMIN/GLOB SERPL: 1.6 G/DL
ALP SERPL-CCNC: 37 U/L (ref 39–117)
ALT SERPL W P-5'-P-CCNC: 15 U/L (ref 1–41)
ANION GAP SERPL CALCULATED.3IONS-SCNC: 10 MMOL/L (ref 5–15)
AST SERPL-CCNC: 16 U/L (ref 1–40)
BILIRUB SERPL-MCNC: 0.6 MG/DL (ref 0–1.2)
BUN SERPL-MCNC: 14 MG/DL (ref 6–20)
BUN/CREAT SERPL: 20.3 (ref 7–25)
CALCIUM SPEC-SCNC: 8 MG/DL (ref 8.6–10.5)
CHLORIDE SERPL-SCNC: 105 MMOL/L (ref 98–107)
CO2 SERPL-SCNC: 25 MMOL/L (ref 22–29)
CREAT SERPL-MCNC: 0.69 MG/DL (ref 0.76–1.27)
DEPRECATED RDW RBC AUTO: 41.9 FL (ref 37–54)
EGFRCR SERPLBLD CKD-EPI 2021: 107.9 ML/MIN/1.73
ERYTHROCYTE [DISTWIDTH] IN BLOOD BY AUTOMATED COUNT: 12.5 % (ref 12.3–15.4)
GLOBULIN UR ELPH-MCNC: 2 GM/DL
GLUCOSE SERPL-MCNC: 107 MG/DL (ref 65–99)
HCT VFR BLD AUTO: 41.1 % (ref 37.5–51)
HGB BLD-MCNC: 13.9 G/DL (ref 13–17.7)
MCH RBC QN AUTO: 31 PG (ref 26.6–33)
MCHC RBC AUTO-ENTMCNC: 33.8 G/DL (ref 31.5–35.7)
MCV RBC AUTO: 91.7 FL (ref 79–97)
PLATELET # BLD AUTO: 342 10*3/MM3 (ref 140–450)
PMV BLD AUTO: 8.9 FL (ref 6–12)
POTASSIUM SERPL-SCNC: 3.8 MMOL/L (ref 3.5–5.2)
PROT SERPL-MCNC: 5.1 G/DL (ref 6–8.5)
RBC # BLD AUTO: 4.48 10*6/MM3 (ref 4.14–5.8)
SODIUM SERPL-SCNC: 140 MMOL/L (ref 136–145)
WBC NRBC COR # BLD AUTO: 8.89 10*3/MM3 (ref 3.4–10.8)

## 2023-11-24 PROCEDURE — 25810000003 SODIUM CHLORIDE 0.9 % SOLUTION: Performed by: FAMILY MEDICINE

## 2023-11-24 PROCEDURE — 85027 COMPLETE CBC AUTOMATED: CPT | Performed by: HOSPITALIST

## 2023-11-24 PROCEDURE — 25810000003 SODIUM CHLORIDE 0.9 % SOLUTION: Performed by: SURGERY

## 2023-11-24 PROCEDURE — 25010000002 ERTAPENEM PER 500 MG: Performed by: FAMILY MEDICINE

## 2023-11-24 PROCEDURE — 99231 SBSQ HOSP IP/OBS SF/LOW 25: CPT | Performed by: HOSPITALIST

## 2023-11-24 PROCEDURE — 25010000002 FLUCONAZOLE PER 200 MG: Performed by: INTERNAL MEDICINE

## 2023-11-24 PROCEDURE — 80053 COMPREHEN METABOLIC PANEL: CPT | Performed by: HOSPITALIST

## 2023-11-24 RX ORDER — HYDROMORPHONE HCL IN 0.9% NACL 10 MG/50ML
PATIENT CONTROLLED ANALGESIA SYRINGE INTRAVENOUS CONTINUOUS
Status: DISCONTINUED | OUTPATIENT
Start: 2023-11-24 | End: 2023-11-25

## 2023-11-24 RX ADMIN — LANSOPRAZOLE 30 MG: 15 TABLET, ORALLY DISINTEGRATING, DELAYED RELEASE ORAL at 08:31

## 2023-11-24 RX ADMIN — SODIUM CHLORIDE 100 ML/HR: 9 INJECTION, SOLUTION INTRAVENOUS at 08:27

## 2023-11-24 RX ADMIN — ATENOLOL 50 MG: 50 TABLET ORAL at 08:32

## 2023-11-24 RX ADMIN — Medication 10 ML: at 21:09

## 2023-11-24 RX ADMIN — ERTAPENEM 1000 MG: 1 INJECTION INTRAMUSCULAR; INTRAVENOUS at 21:06

## 2023-11-24 RX ADMIN — TRIAMTERENE AND HYDROCHLOROTHIAZIDE 1 TABLET: 37.5; 25 TABLET ORAL at 08:33

## 2023-11-24 RX ADMIN — FAMOTIDINE 40 MG: 20 TABLET, FILM COATED ORAL at 08:32

## 2023-11-24 RX ADMIN — ASPIRIN 81 MG: 81 TABLET, COATED ORAL at 08:32

## 2023-11-24 RX ADMIN — FLUCONAZOLE 400 MG: 400 INJECTION, SOLUTION INTRAVENOUS at 11:50

## 2023-11-24 RX ADMIN — SODIUM CHLORIDE 75 ML/HR: 9 INJECTION, SOLUTION INTRAVENOUS at 23:30

## 2023-11-24 NOTE — PROGRESS NOTES
Nutrition Services    Patient Name:  Lobo Yu  YOB: 1965  MRN: [id number]  Admit Date:  11/20/2023      Patient now NPO/Clear liquids x 3 days.    EMR reviewed. Perforated sigmoid diverticulitis with intra-abdominal free air and abscess s/p ex lap, sigmoid colectomy with colostomy, abscess drainage, appendectomy (11/20).    NGT output x 24 hours = 1500 ml. No colostomy stool output x 24 hours.    RD will continue to monitor PO diet progression. Please consult RD if nutrition support indicated.      Electronically signed by:  Keren Hernandez RD  11/24/23 10:30 EST

## 2023-11-24 NOTE — PROGRESS NOTES
[facility] Medicine Services  PROGRESS NOTE    Patient Name: Lobo Yu  : 1965  MRN: [id number]    Date of Admission: 2023  Primary Care Physician: Alissa Tony DO    Subjective   Subjective     CC:  Abdominal Pain    HPI:  Patient resting in bed, no family at bedside today. Patient states he has been using his Dilaudid pca more and is more comfortable. He states pain is better controlled. He has been walking in the hallways a few times/day and tolerating that well.      Objective   Objective     Vital Signs:   Temp:  [95.2 °F (35.1 °C)-98 °F (36.7 °C)] 96.2 °F (35.7 °C)  Heart Rate:  [] 85  Resp:  [16-18] 18  BP: (136-153)/() 153/101     Physical Exam:  Constitutional: in pain, awake, alert  HENT: NCAT, mucous membranes moist, NG in place  Respiratory: Clear to auscultation bilaterally, respiratory effort normal   Cardiovascular: RRR, no murmurs, rubs, or gallops  Gastrointestinal: negative bowel sounds, soft, nondistended, DEBO stable  Musculoskeletal: No bilateral ankle edema  Psychiatric: Appropriate affect, cooperative  Neurologic: Oriented x 3, strength symmetric in all extremities, Cranial Nerves grossly intact to confrontation, speech clear  Skin: No rashes, incision c/d/i    Results Reviewed:  LAB RESULTS:      Lab 23  0428 23  0337 23  0318 23  0528 23  1145   WBC 8.89 10.38 13.69* 12.63* 10.11   HEMOGLOBIN 13.9 14.5 14.3 13.0 14.0   HEMATOCRIT 41.1 42.6 42.6 38.7 41.5   PLATELETS 342 327 345 293 285   NEUTROS ABS  --   --   --  10.63* 7.41*   IMMATURE GRANS (ABS)  --   --   --  0.07* 0.06*   LYMPHS ABS  --   --   --  0.68* 1.30   MONOS ABS  --   --   --  1.24* 1.18*   EOS ABS  --   --   --  0.00 0.14   MCV 91.7 92.2 93.6 92.8 93.7   PROCALCITONIN  --   --   --   --  0.34*   LACTATE  --   --   --   --  1.0         Lab 23  0428 23  0337 23  0318 23  0528 23  1145   SODIUM 140 140 138 140  140   POTASSIUM 3.8 4.1 3.9 4.3 3.6   CHLORIDE 105 103 100 101 102   CO2 25.0 26.0 28.0 28.0 27.0   ANION GAP 10.0 11.0 10.0 11.0 11.0   BUN 14 13 14 10 10   CREATININE 0.69* 0.81 0.93 0.89 0.99   EGFR 107.9 102.8 95.8 100.0 88.9   GLUCOSE 107* 97 105* 133* 122*   CALCIUM 8.0* 8.4* 8.4* 8.5* 9.1         Lab 11/24/23  0428 11/23/23  0337 11/22/23  0318 11/21/23  0528 11/20/23  1145   TOTAL PROTEIN 5.1* 5.9* 6.7 6.1 7.4   ALBUMIN 3.1* 3.1* 3.5 3.6 4.0   GLOBULIN 2.0 2.8 3.2 2.5 3.4   ALT (SGPT) 15 15 16 18 19   AST (SGOT) 16 18 21 20 16   BILIRUBIN 0.6 0.6 0.6 0.6 0.8   ALK PHOS 37* 44 52 47 71   LIPASE  --   --   --   --  49                 Lab 11/20/23  1525   ABO TYPING O   RH TYPING Negative   ANTIBODY SCREEN Negative         Brief Urine Lab Results  (Last result in the past 365 days)        Color   Clarity   Blood   Leuk Est   Nitrite   Protein   CREAT   Urine HCG        11/20/23 1147 Yellow   Clear   Negative   Small (1+)   Negative   Negative                   Microbiology Results Abnormal       Procedure Component Value - Date/Time    Blood Culture - Blood, Arm, Right [[id number]]  (Normal) Collected: 11/20/23 1501    Lab Status: Preliminary result Specimen: Blood from Arm, Right Updated: 11/23/23 1531     Blood Culture No growth at 3 days    Blood Culture - Blood, Arm, Right [[id number]]  (Normal) Collected: 11/20/23 1430    Lab Status: Preliminary result Specimen: Blood from Arm, Right Updated: 11/23/23 1501     Blood Culture No growth at 3 days            No radiology results from the last 24 hrs        Current medications:  Scheduled Meds:aspirin, 81 mg, Oral, Daily  atenolol, 50 mg, Oral, Daily  ertapenem, 1,000 mg, Intravenous, Q24H  famotidine, 40 mg, Oral, Daily  fluconazole, 400 mg, Intravenous, Q24H  heparin (porcine), 5,000 Units, Subcutaneous, Q8H  lansoprazole, 30 mg, Oral, QAM AC  povidone-iodine, 1 application , Topical, Q24H  sodium chloride, 10 mL, Intravenous,  Q12H  triamterene-hydrochlorothiazide, 1 tablet, Oral, Daily      Continuous Infusions:HYDROmorphone HCl-NaCl,   Pharmacy Consult,   sodium chloride, 75 mL/hr, Last Rate: 100 mL/hr (11/24/23 0827)  sodium chloride, 30 mL/hr      PRN Meds:.  acetaminophen **OR** [DISCONTINUED] acetaminophen **OR** [DISCONTINUED] acetaminophen    Calcium Replacement - Follow Nurse / BPA Driven Protocol    carisoprodol    hydrALAZINE    labetalol    Magnesium Standard Dose Replacement - Follow Nurse / BPA Driven Protocol    naloxone    nitroglycerin    ondansetron **OR** ondansetron    Pharmacy Consult    phenol    Phosphorus Replacement - Follow Nurse / BPA Driven Protocol    Potassium Replacement - Follow Nurse / BPA Driven Protocol    prochlorperazine    Sodium Chloride (PF)    [COMPLETED] Insert Peripheral IV **AND** sodium chloride    sodium chloride    sodium chloride    sodium chloride    Assessment & Plan   Assessment & Plan     Active Hospital Problems    Diagnosis  POA    **Perforated diverticulum [K57.80]  Yes    Essential hypertension [I10]  Unknown    Pulmonary hypertension [I27.20]  Unknown      Resolved Hospital Problems   No resolved problems to display.        Brief Hospital Course to date:  Lobo Yu is a 58 y.o. male with PMHx HTN and colon polyps who presents with increased abdominal pain. In ED, CT imaging with acute sigmoid diverticulitis with renato perforation with pneumoperitoneum. General Surgery, Dr Vallecillo, evaluated in ED, and took the patient urgently to the OR on 11/20/23.     This patient's problems and plans were partially entered by my partner and updated as appropriate by me 11/24/23.    Acute Sigmoid Diverticulitis with perforation & Pneumoperitoneum   Hx Diverticulosis   S/P Colonoscopy with polyp removal x3 11/6/23  -POD #4 s/p sigmoid colectomy with end colostomy and abscess drainage per Dr. Vallecillo  -IV ABX- Invanz 1gm IV Q24H/Fluconazole 400mg IV Q24H  - ID following  - d/c NG today  -  clear liquid diet  - ok to ambulate  - awaiting stoma function   -Pain Control- Dilaudid PCA  -Blood Cx negative to date  - prn Thorazine for hiccups     HTN  -Resume home meds   -PRN IV Labetalol     Expected Discharge Location and Transportation: home  Expected Discharge   Expected Discharge Date: 11/27/2023; Expected Discharge Time:      DVT prophylaxis:  Medical and mechanical DVT prophylaxis orders are present.     AM-PAC 6 Clicks Score (PT): 23 (11/24/23 0800)    CODE STATUS:   Code Status and Medical Interventions:   Ordered at: 11/20/23 2017     Level Of Support Discussed With:    Patient     Code Status (Patient has no pulse and is not breathing):    CPR (Attempt to Resuscitate)     Medical Interventions (Patient has pulse or is breathing):    Full Support       Leena Florentino DO  11/24/23

## 2023-11-24 NOTE — PROGRESS NOTES
"General Surgery Post op Follow Up Note    Subjective:   No new complaints.  Feeling better.    BP (!) 153/101 (BP Location: Left arm, Patient Position: Lying)   Pulse 85   Temp 96.2 °F (35.7 °C) (Oral)   Resp 18   Ht 180.3 cm (71\")   Wt 88.5 kg (195 lb)   SpO2 94%   BMI 27.20 kg/m²     General Appearance: Mild distress  Abdomen: incision is clean and dry, stoma viable, DEBO noted    CBC  Results from last 7 days   Lab Units 11/24/23  0428   WBC 10*3/mm3 8.89   HEMOGLOBIN g/dL 13.9   HEMATOCRIT % 41.1   PLATELETS 10*3/mm3 342       CMP/BMP  Results from last 7 days   Lab Units 11/24/23  0428   SODIUM mmol/L 140   POTASSIUM mmol/L 3.8   CHLORIDE mmol/L 105   CO2 mmol/L 25.0   BUN mg/dL 14   CREATININE mg/dL 0.69*   CALCIUM mg/dL 8.0*   BILIRUBIN mg/dL 0.6   ALK PHOS U/L 37*   ALT (SGPT) U/L 15   AST (SGOT) U/L 16   GLUCOSE mg/dL 107*         ASSESSMENT/PLAN:  Postop day 4 partial colectomy and colostomy creation with abscess drainage    DC nasogastric tube.  Continue IV antibiotics.  Clear liquid diet, supplements.  Mobilize.    Hitesh Vallecillo MD  11/24/2023  11:15 EST   " rolling walker

## 2023-11-24 NOTE — PLAN OF CARE
Goal Outcome Evaluation:     Problem: Adult Inpatient Plan of Care  Goal: Plan of Care Review  Outcome: Ongoing, Progressing  Goal: Patient-Specific Goal (Individualized)  Outcome: Ongoing, Progressing  Goal: Absence of Hospital-Acquired Illness or Injury  Outcome: Ongoing, Progressing  Intervention: Identify and Manage Fall Risk  Recent Flowsheet Documentation  Taken 11/24/2023 0400 by Gordon Eason RN  Safety Promotion/Fall Prevention:   activity supervised   assistive device/personal items within reach   clutter free environment maintained   nonskid shoes/slippers when out of bed  Taken 11/24/2023 0116 by Gordon Eason RN  Safety Promotion/Fall Prevention:   activity supervised   assistive device/personal items within reach   clutter free environment maintained   nonskid shoes/slippers when out of bed  Taken 11/24/2023 0000 by Gordon Eason RN  Safety Promotion/Fall Prevention:   activity supervised   assistive device/personal items within reach   clutter free environment maintained   nonskid shoes/slippers when out of bed  Taken 11/23/2023 2200 by Gordon Eason RN  Safety Promotion/Fall Prevention:   activity supervised   assistive device/personal items within reach   clutter free environment maintained   nonskid shoes/slippers when out of bed  Taken 11/23/2023 2000 by Gordon Eason RN  Safety Promotion/Fall Prevention:   activity supervised   assistive device/personal items within reach   clutter free environment maintained   nonskid shoes/slippers when out of bed  Intervention: Prevent Skin Injury  Recent Flowsheet Documentation  Taken 11/24/2023 0400 by Gordon Eason RN  Body Position: position changed independently  Taken 11/24/2023 0116 by Gordon Eason RN  Body Position: position changed independently  Taken 11/23/2023 2000 by Gordon Eason RN  Body Position: position changed independently  Intervention: Prevent and Manage VTE (Venous Thromboembolism) Risk  Recent Flowsheet  Documentation  Taken 11/24/2023 0400 by Gordon Eason RN  Activity Management: ambulated outside room  Taken 11/24/2023 0116 by Gordon Eason RN  Activity Management: ambulated outside room  Taken 11/24/2023 0000 by Gordon Esaon RN  Activity Management: activity encouraged  Taken 11/23/2023 2200 by Gordon Eason RN  Activity Management: ambulated outside room  Taken 11/23/2023 2000 by Gordon Eason RN  Activity Management: ambulated outside room  Goal: Optimal Comfort and Wellbeing  Outcome: Ongoing, Progressing  Goal: Readiness for Transition of Care  Outcome: Ongoing, Progressing     Problem: Fall Injury Risk  Goal: Absence of Fall and Fall-Related Injury  Outcome: Ongoing, Progressing  Intervention: Promote Injury-Free Environment  Recent Flowsheet Documentation  Taken 11/24/2023 0400 by Gordon Eason RN  Safety Promotion/Fall Prevention:   activity supervised   assistive device/personal items within reach   clutter free environment maintained   nonskid shoes/slippers when out of bed  Taken 11/24/2023 0116 by Gordon Eason RN  Safety Promotion/Fall Prevention:   activity supervised   assistive device/personal items within reach   clutter free environment maintained   nonskid shoes/slippers when out of bed  Taken 11/24/2023 0000 by Gordon Eason RN  Safety Promotion/Fall Prevention:   activity supervised   assistive device/personal items within reach   clutter free environment maintained   nonskid shoes/slippers when out of bed  Taken 11/23/2023 2200 by Gordon Eason RN  Safety Promotion/Fall Prevention:   activity supervised   assistive device/personal items within reach   clutter free environment maintained   nonskid shoes/slippers when out of bed  Taken 11/23/2023 2000 by Gordon Eason RN  Safety Promotion/Fall Prevention:   activity supervised   assistive device/personal items within reach   clutter free environment maintained   nonskid shoes/slippers when out of bed     Problem:  Skin Injury Risk Increased  Goal: Skin Health and Integrity  Outcome: Ongoing, Progressing  Intervention: Optimize Skin Protection  Recent Flowsheet Documentation  Taken 11/24/2023 0400 by Gordon Eason RN  Head of Bed (HOB) Positioning: HOB elevated  Taken 11/24/2023 0116 by Gordon Eason RN  Head of Bed (HOB) Positioning: HOB elevated  Taken 11/23/2023 2000 by Gordon Eason RN  Head of Bed (HOB) Positioning: HOB elevated

## 2023-11-25 LAB
ALBUMIN SERPL-MCNC: 2.9 G/DL (ref 3.5–5.2)
ALBUMIN/GLOB SERPL: 1.2 G/DL
ALP SERPL-CCNC: 39 U/L (ref 39–117)
ALT SERPL W P-5'-P-CCNC: 22 U/L (ref 1–41)
ANION GAP SERPL CALCULATED.3IONS-SCNC: 9 MMOL/L (ref 5–15)
AST SERPL-CCNC: 28 U/L (ref 1–40)
BACTERIA SPEC AEROBE CULT: NORMAL
BACTERIA SPEC AEROBE CULT: NORMAL
BILIRUB SERPL-MCNC: 0.6 MG/DL (ref 0–1.2)
BUN SERPL-MCNC: 10 MG/DL (ref 6–20)
BUN/CREAT SERPL: 13.9 (ref 7–25)
CALCIUM SPEC-SCNC: 8.1 MG/DL (ref 8.6–10.5)
CHLORIDE SERPL-SCNC: 104 MMOL/L (ref 98–107)
CO2 SERPL-SCNC: 27 MMOL/L (ref 22–29)
CREAT SERPL-MCNC: 0.72 MG/DL (ref 0.76–1.27)
DEPRECATED RDW RBC AUTO: 40.9 FL (ref 37–54)
EGFRCR SERPLBLD CKD-EPI 2021: 106.6 ML/MIN/1.73
ERYTHROCYTE [DISTWIDTH] IN BLOOD BY AUTOMATED COUNT: 12.4 % (ref 12.3–15.4)
GLOBULIN UR ELPH-MCNC: 2.4 GM/DL
GLUCOSE SERPL-MCNC: 87 MG/DL (ref 65–99)
HCT VFR BLD AUTO: 37.7 % (ref 37.5–51)
HGB BLD-MCNC: 12.9 G/DL (ref 13–17.7)
MCH RBC QN AUTO: 31.2 PG (ref 26.6–33)
MCHC RBC AUTO-ENTMCNC: 34.2 G/DL (ref 31.5–35.7)
MCV RBC AUTO: 91.3 FL (ref 79–97)
PLATELET # BLD AUTO: 327 10*3/MM3 (ref 140–450)
PMV BLD AUTO: 8.9 FL (ref 6–12)
POTASSIUM SERPL-SCNC: 4.1 MMOL/L (ref 3.5–5.2)
PROT SERPL-MCNC: 5.3 G/DL (ref 6–8.5)
RBC # BLD AUTO: 4.13 10*6/MM3 (ref 4.14–5.8)
SODIUM SERPL-SCNC: 140 MMOL/L (ref 136–145)
WBC NRBC COR # BLD AUTO: 7.48 10*3/MM3 (ref 3.4–10.8)

## 2023-11-25 PROCEDURE — C1894 INTRO/SHEATH, NON-LASER: HCPCS

## 2023-11-25 PROCEDURE — 80053 COMPREHEN METABOLIC PANEL: CPT | Performed by: HOSPITALIST

## 2023-11-25 PROCEDURE — 99232 SBSQ HOSP IP/OBS MODERATE 35: CPT | Performed by: INTERNAL MEDICINE

## 2023-11-25 PROCEDURE — 25810000003 SODIUM CHLORIDE 0.9 % SOLUTION: Performed by: SURGERY

## 2023-11-25 PROCEDURE — 25010000002 HEPARIN (PORCINE) PER 1000 UNITS: Performed by: SURGERY

## 2023-11-25 PROCEDURE — 85027 COMPLETE CBC AUTOMATED: CPT | Performed by: HOSPITALIST

## 2023-11-25 PROCEDURE — 25010000002 FLUCONAZOLE PER 200 MG: Performed by: INTERNAL MEDICINE

## 2023-11-25 PROCEDURE — 25010000002 ERTAPENEM PER 500 MG: Performed by: INTERNAL MEDICINE

## 2023-11-25 PROCEDURE — C1751 CATH, INF, PER/CENT/MIDLINE: HCPCS

## 2023-11-25 RX ORDER — SODIUM CHLORIDE 0.9 % (FLUSH) 0.9 %
10 SYRINGE (ML) INJECTION EVERY 12 HOURS SCHEDULED
Status: DISCONTINUED | OUTPATIENT
Start: 2023-11-25 | End: 2023-11-27 | Stop reason: HOSPADM

## 2023-11-25 RX ORDER — DOCUSATE SODIUM 100 MG/1
100 CAPSULE, LIQUID FILLED ORAL 2 TIMES DAILY
Status: DISCONTINUED | OUTPATIENT
Start: 2023-11-25 | End: 2023-11-27 | Stop reason: HOSPADM

## 2023-11-25 RX ORDER — BISACODYL 5 MG/1
10 TABLET, DELAYED RELEASE ORAL DAILY
Status: DISCONTINUED | OUTPATIENT
Start: 2023-11-25 | End: 2023-11-27

## 2023-11-25 RX ORDER — HYDROMORPHONE HYDROCHLORIDE 1 MG/ML
0.2 INJECTION, SOLUTION INTRAMUSCULAR; INTRAVENOUS; SUBCUTANEOUS
Status: DISCONTINUED | OUTPATIENT
Start: 2023-11-25 | End: 2023-11-27 | Stop reason: HOSPADM

## 2023-11-25 RX ORDER — SODIUM CHLORIDE 0.9 % (FLUSH) 0.9 %
10 SYRINGE (ML) INJECTION AS NEEDED
Status: DISCONTINUED | OUTPATIENT
Start: 2023-11-25 | End: 2023-11-27 | Stop reason: HOSPADM

## 2023-11-25 RX ORDER — ACETAMINOPHEN 325 MG/1
650 TABLET ORAL EVERY 6 HOURS
Status: DISCONTINUED | OUTPATIENT
Start: 2023-11-25 | End: 2023-11-25

## 2023-11-25 RX ORDER — OXYCODONE HYDROCHLORIDE AND ACETAMINOPHEN 5; 325 MG/1; MG/1
2 TABLET ORAL EVERY 4 HOURS PRN
Status: DISCONTINUED | OUTPATIENT
Start: 2023-11-25 | End: 2023-11-27 | Stop reason: HOSPADM

## 2023-11-25 RX ORDER — SODIUM CHLORIDE 0.9 % (FLUSH) 0.9 %
20 SYRINGE (ML) INJECTION AS NEEDED
Status: DISCONTINUED | OUTPATIENT
Start: 2023-11-25 | End: 2023-11-27 | Stop reason: HOSPADM

## 2023-11-25 RX ADMIN — DOCUSATE SODIUM 100 MG: 100 CAPSULE, LIQUID FILLED ORAL at 12:18

## 2023-11-25 RX ADMIN — ATENOLOL 50 MG: 50 TABLET ORAL at 07:41

## 2023-11-25 RX ADMIN — ERTAPENEM 1000 MG: 1 INJECTION INTRAMUSCULAR; INTRAVENOUS at 21:16

## 2023-11-25 RX ADMIN — DOCUSATE SODIUM 100 MG: 100 CAPSULE, LIQUID FILLED ORAL at 21:16

## 2023-11-25 RX ADMIN — Medication 10 ML: at 21:17

## 2023-11-25 RX ADMIN — FLUCONAZOLE 400 MG: 400 INJECTION, SOLUTION INTRAVENOUS at 12:37

## 2023-11-25 RX ADMIN — Medication: at 07:31

## 2023-11-25 RX ADMIN — FAMOTIDINE 40 MG: 20 TABLET, FILM COATED ORAL at 07:41

## 2023-11-25 RX ADMIN — LANSOPRAZOLE 30 MG: 15 TABLET, ORALLY DISINTEGRATING, DELAYED RELEASE ORAL at 07:48

## 2023-11-25 RX ADMIN — SODIUM CHLORIDE 75 ML/HR: 9 INJECTION, SOLUTION INTRAVENOUS at 12:21

## 2023-11-25 RX ADMIN — OXYCODONE HYDROCHLORIDE AND ACETAMINOPHEN 2 TABLET: 5; 325 TABLET ORAL at 18:17

## 2023-11-25 RX ADMIN — ASPIRIN 81 MG: 81 TABLET, COATED ORAL at 07:42

## 2023-11-25 RX ADMIN — BISACODYL 10 MG: 5 TABLET, COATED ORAL at 12:19

## 2023-11-25 RX ADMIN — OXYCODONE HYDROCHLORIDE AND ACETAMINOPHEN 2 TABLET: 5; 325 TABLET ORAL at 12:19

## 2023-11-25 RX ADMIN — POVIDONE-IODINE 1 APPLICATION: 100 OINTMENT TOPICAL at 14:39

## 2023-11-25 NOTE — PROGRESS NOTES
"Patient Name:  Lobo Yu  YOB: 1965  [id number]    Surgery Progress Note    Date of visit: 11/25/2023      Subjective: No acute events.  Walked yesterday.  Denies nausea or vomiting.  Tolerated clear liquids.  Stoma with some gas out but no significant stool.  DEBO 160 mL of output.  Urine output 1 L yesterday.          Objective:     /94   Pulse 74   Temp 98 °F (36.7 °C) (Oral)   Resp 18   Ht 180.3 cm (71\")   Wt 88.5 kg (195 lb)   SpO2 95%   BMI 27.20 kg/m²     Intake/Output Summary (Last 24 hours) at 11/25/2023 0912  Last data filed at 11/25/2023 0553  Gross per 24 hour   Intake 3 ml   Output 1060 ml   Net -1057 ml       GEN:   Awake, alert, in no acute distress, resting comfortably in bed   CV:   Regular rate and rhythm  L:  Symmetric expansion, not labored on oxygen by nasal cannula  Abd:  Soft, appropriately tender palpation on midline incision, midline incision is clean dry and intact, colostomy in the left side is pink and patent above the skin level without significant stool output in the bag, DEBO drain is serosanguineous  Ext:  No cyanosis, clubbing, or edema    Recent labs that are back at this time have been reviewed.           Assessment/ Plan:    Mr. Yu is a 58-year-old gentleman who was admitted following laparotomy with sigmoid colectomy and end colostomy creation on November 20 by Dr. Vallecillo for perforated diverticulitis    #Perforated diverticulitis  -Postop day 5 following Granados's  -Vital signs are stable.  White blood cell count is 7.  Hemoglobin 12.9.  Creatinine 0.9  -Advance to full liquid diet  -Discontinue PCA today  -Continue antibiotics  -Awaiting return of bowel function to the stoma  -Mobilize  -We will add on a scheduled bowel regimen today      Patrick Kurtz MD  11/25/2023  09:12 EST      "

## 2023-11-25 NOTE — CONSULTS
4FR DOUBLE LUMEN PICC placed by DONALDO RAMOS, tip verified by 3cg. SEE lda, NO COMPLICATIONS NOTED

## 2023-11-25 NOTE — PROGRESS NOTES
[facility] Medicine Services  PROGRESS NOTE    Patient Name: Lobo Yu  : 1965  MRN: [id number]    Date of Admission: 2023  Primary Care Physician: Alissa Tony DO    Subjective   Subjective     CC:  Abdominal Pain    HPI:  Doing okay this am, no BM yet.       Objective   Objective     Vital Signs:   Temp:  [95.5 °F (35.3 °C)-98.1 °F (36.7 °C)] 98 °F (36.7 °C)  Heart Rate:  [66-94] 74  Resp:  [16-18] 18  BP: (115-151)/(78-98) 141/94     Physical Exam:  Constitutional: in pain, awake, alert  HENT: NCAT, mucous membranes moist  Respiratory: Clear to auscultation bilaterally, respiratory effort normal   Cardiovascular: RRR, no murmurs, rubs, or gallops  Gastrointestinal: negative bowel sounds, soft, nondistended, DEBO stable,ostomy bag in place with serous fluid  Musculoskeletal: No bilateral ankle edema  Psychiatric: Appropriate affect, cooperative  Neurologic: Oriented x 3, strength symmetric in all extremities, Cranial Nerves grossly intact to confrontation, speech clear  Skin: No rashes, incision c/d/i    Results Reviewed:  LAB RESULTS:      Lab 23  0359 23  0428 23  0337 23  0318 23  0528 23  1145   WBC 7.48 8.89 10.38 13.69* 12.63* 10.11   HEMOGLOBIN 12.9* 13.9 14.5 14.3 13.0 14.0   HEMATOCRIT 37.7 41.1 42.6 42.6 38.7 41.5   PLATELETS 327 342 327 345 293 285   NEUTROS ABS  --   --   --   --  10.63* 7.41*   IMMATURE GRANS (ABS)  --   --   --   --  0.07* 0.06*   LYMPHS ABS  --   --   --   --  0.68* 1.30   MONOS ABS  --   --   --   --  1.24* 1.18*   EOS ABS  --   --   --   --  0.00 0.14   MCV 91.3 91.7 92.2 93.6 92.8 93.7   PROCALCITONIN  --   --   --   --   --  0.34*   LACTATE  --   --   --   --   --  1.0         Lab 23  0359 23  0428 23  0337 23  0318 23  0528   SODIUM 140 140 140 138 140   POTASSIUM 4.1 3.8 4.1 3.9 4.3   CHLORIDE 104 105 103 100 101   CO2 27.0 25.0 26.0 28.0 28.0   ANION GAP 9.0 10.0  11.0 10.0 11.0   BUN 10 14 13 14 10   CREATININE 0.72* 0.69* 0.81 0.93 0.89   EGFR 106.6 107.9 102.8 95.8 100.0   GLUCOSE 87 107* 97 105* 133*   CALCIUM 8.1* 8.0* 8.4* 8.4* 8.5*         Lab 11/25/23  0359 11/24/23  0428 11/23/23  0337 11/22/23  0318 11/21/23  0528 11/20/23  1145   TOTAL PROTEIN 5.3* 5.1* 5.9* 6.7 6.1 7.4   ALBUMIN 2.9* 3.1* 3.1* 3.5 3.6 4.0   GLOBULIN 2.4 2.0 2.8 3.2 2.5 3.4   ALT (SGPT) 22 15 15 16 18 19   AST (SGOT) 28 16 18 21 20 16   BILIRUBIN 0.6 0.6 0.6 0.6 0.6 0.8   ALK PHOS 39 37* 44 52 47 71   LIPASE  --   --   --   --   --  49                 Lab 11/20/23  1525   ABO TYPING O   RH TYPING Negative   ANTIBODY SCREEN Negative         Brief Urine Lab Results  (Last result in the past 365 days)        Color   Clarity   Blood   Leuk Est   Nitrite   Protein   CREAT   Urine HCG        11/20/23 1147 Yellow   Clear   Negative   Small (1+)   Negative   Negative                   Microbiology Results Abnormal       Procedure Component Value - Date/Time    Blood Culture - Blood, Arm, Right [[id number]]  (Normal) Collected: 11/20/23 1501    Lab Status: Preliminary result Specimen: Blood from Arm, Right Updated: 11/24/23 1531     Blood Culture No growth at 4 days    Blood Culture - Blood, Arm, Right [[id number]]  (Normal) Collected: 11/20/23 1430    Lab Status: Preliminary result Specimen: Blood from Arm, Right Updated: 11/24/23 1500     Blood Culture No growth at 4 days            No radiology results from the last 24 hrs        Current medications:  Scheduled Meds:aspirin, 81 mg, Oral, Daily  atenolol, 50 mg, Oral, Daily  bisacodyl, 10 mg, Oral, Daily  docusate sodium, 100 mg, Oral, BID  ertapenem, 1,000 mg, Intravenous, Q24H  famotidine, 40 mg, Oral, Daily  fluconazole, 400 mg, Intravenous, Q24H  heparin (porcine), 5,000 Units, Subcutaneous, Q8H  lansoprazole, 30 mg, Oral, QAM AC  povidone-iodine, 1 application , Topical, Q24H  sodium chloride, 10 mL, Intravenous, Q12H  triamterene-hydrochlorothiazide,  1 tablet, Oral, Daily      Continuous Infusions:Pharmacy Consult,   sodium chloride, 75 mL/hr, Last Rate: 75 mL/hr (11/24/23 2330)  sodium chloride, 30 mL/hr      PRN Meds:.  acetaminophen **OR** [DISCONTINUED] acetaminophen **OR** [DISCONTINUED] acetaminophen    Calcium Replacement - Follow Nurse / BPA Driven Protocol    carisoprodol    hydrALAZINE    HYDROmorphone    labetalol    Magnesium Standard Dose Replacement - Follow Nurse / BPA Driven Protocol    naloxone    nitroglycerin    ondansetron **OR** ondansetron    oxyCODONE-acetaminophen    Pharmacy Consult    phenol    Phosphorus Replacement - Follow Nurse / BPA Driven Protocol    Potassium Replacement - Follow Nurse / BPA Driven Protocol    prochlorperazine    Sodium Chloride (PF)    [COMPLETED] Insert Peripheral IV **AND** sodium chloride    sodium chloride    sodium chloride    sodium chloride    Assessment & Plan   Assessment & Plan     Active Hospital Problems    Diagnosis  POA    **Perforated diverticulum [K57.80]  Yes    Essential hypertension [I10]  Unknown    Pulmonary hypertension [I27.20]  Unknown      Resolved Hospital Problems   No resolved problems to display.        Brief Hospital Course to date:  Lobo Yu is a 58 y.o. male with PMHx HTN and colon polyps who presented with increased abdominal pain. In ED, CT imaging with acute sigmoid diverticulitis with renato perforation with pneumoperitoneum. General Surgery, Dr Vallecillo, evaluated in ED, and took the patient urgently to the OR on 11/20/23.     This patient's problems and plans were partially entered by my partner and updated as appropriate by me 11/25/23.    Acute Sigmoid Diverticulitis with perforation & Pneumoperitoneum   Hx Diverticulosis   S/P Colonoscopy with polyp removal x3 11/6/23  -POD 5 s/p sigmoid colectomy with end colostomy and abscess drainage per Dr. Vallecillo  -IV ABX- Invanz 1gm IV Q24H/Fluconazole 400mg IV Q24H  - ID following  - d/c'd NG 11/24  - advance to full liquid  diet today  - ok to ambulate  - awaiting stoma function - bowel regimen per surgery   -Pain Control- d/c'ing PCA today, continue with Oral pain meds with IV dilaudid for breakthrough   -Blood Cx negative to date  - prn Thorazine for hiccups     HTN  -Resumed home meds   -PRN IV Labetalol     Expected Discharge Location and Transportation: home  Expected Discharge   Expected Discharge Date: 11/27/2023; Expected Discharge Time:      DVT prophylaxis:  Medical and mechanical DVT prophylaxis orders are present.     AM-PAC 6 Clicks Score (PT): 23 (11/25/23 0354)    CODE STATUS:   Code Status and Medical Interventions:   Ordered at: 11/20/23 2017     Level Of Support Discussed With:    Patient     Code Status (Patient has no pulse and is not breathing):    CPR (Attempt to Resuscitate)     Medical Interventions (Patient has pulse or is breathing):    Full Support       Nazia Avalos MD  11/25/23

## 2023-11-25 NOTE — PROGRESS NOTES
INFECTIOUS DISEASE CONSULT/INITIAL HOSPITAL VISIT    Lobo Yu  1965  [id number]    Date of consult: 11/21/23    Admit date: 11/20/2023    Requesting Provider: Dr. Vallecillo  Evaluating physician: Eric Sidhu MD  Reason for Consultation: Perforated sigmoid diverticulitis   Chief Complaint: abdominal pain      Subjective   Lobo Yu is a  58 y.o.  Yr old male With a past medical history of hypertension, and colon polyps who presented On 11/20 due to worsening abdominal pain.  He had a colonoscopy with Dr. Avalos on 11/9 for follow-up for colon polyps there were noted 5 years ago.  He had 3 polyps removed.  He was also noted to have diverticulosis.  He started having abdominal pain the day after his colonoscopy and he notified his GI physician he was initially prescribed PO flagyl with some initial improvement after taking this for about 1 week. After some initial improvement he subsequently went to [place] for vacation but started feeling worse and went to a clinic and was prescribed the same antibiotics there (ciprofloxacin and flagyl per his report).  He still continued to feel poorly when he arrived back to the [place] with abdominal pain.  He is not having any nausea or vomiting.  No renato diarrhea.    Since arrival, the patient is remained afebrile. White blood cell count was initially 10.11 but has trended up to 12.63 today. Pro-calcitonin was 0.34.  Creatinine and LFTs were within normal limits.  Urinalysis showed 1+ leukocyte esterase but was otherwise unremarkable.  Blood cultures are in progress. CT abdomen and pelvis that was done yesterday showed acute sigmoid diverticulitis with renato perforation with some pneumoperitoneum noted that was distant from the sigmoid source.  He did have extensive surrounding inflammatory change without a drainable fluid collection. He was taken to the OR yesterday by Dr. Vallecillo and underwent an exploratory laparotomy with sigmoid colectomy with  colostomy.  He underwent intra-abdominal abscess drainage with a 15 Swedish Livan drain placed and appendectomy as well. He did have significant signs of inflammation consistent with perforated diverticulitis. No cultures were sent from the procedure. The patient was initially on Zosyn but has been switched to Invanz.  ID has been consulted for antibiotic recs.    Subjective:    11/22/23: Patient is still having some abdominal distention and not feeling well today.  NG tube remains in place.  Not having any severe nausea.  No ostomy output yet.  No fevers.  He denies any pruritus or rash. Leukocytosis is slightly worse today with a white blood cell count of 13.69.    11/23/23: The patient is still having some abdominal distention and No output from his ostomy yet.  NG tube remains in place.  He denies any severe nausea or vomiting.  No fevers.  Leukocytosis improved today.  No new rashes reported.  Still feeling frail/week.    11/25/23: Clinically improving. NG tube removed. Still has 1 DEBO drain in place. No fevers. Abdominal pain improving. No fevers. Leukocytosis improved. No rashes reported.    Past Medical History:   Diagnosis Date    GERD (gastroesophageal reflux disease)     Hypertension        Past Surgical History:   Procedure Laterality Date    EXPLORATORY LAPAROTOMY N/A 11/20/2023    Procedure: EXPLORATORY LAPAROTOMY, SIGMOID COLECTOMY WITH COLOSTOMY CREAION, INTRA-ABDOMINAL ABSCESS DRAINAGE, APPENDECTOMY;  Surgeon: Hitesh Vallecillo MD;  Location: [facility];  Service: General;  Laterality: N/A;    NEPHROLITHOTOMY      TONSILLECTOMY         Pediatric History   Patient Parents    Not on file     Other Topics Concern    Not on file   Social History Narrative    Not on file       family history is not on file.    No Known Allergies      There is no immunization history on file for this patient.    Medication:    Current Facility-Administered Medications:     acetaminophen (TYLENOL) tablet 650 mg, 650 mg,  Oral, Q4H PRN **OR** [DISCONTINUED] acetaminophen (TYLENOL) 160 MG/5ML oral solution 650 mg, 650 mg, Oral, Q4H PRN **OR** [DISCONTINUED] acetaminophen (TYLENOL) suppository 650 mg, 650 mg, Rectal, Q4H PRN, Grace Markham DO    aspirin EC tablet 81 mg, 81 mg, Oral, Daily, Hitesh Vallecillo MD, 81 mg at 11/25/23 0742    atenolol (TENORMIN) tablet 50 mg, 50 mg, Oral, Daily, Hitesh Vallecillo MD, 50 mg at 11/25/23 0741    bisacodyl (DULCOLAX) EC tablet 10 mg, 10 mg, Oral, Daily, Patrick Kurtz MD, 10 mg at 11/25/23 1219    Calcium Replacement - Follow Nurse / BPA Driven Protocol, , Does not apply, PRN, Grace Markham DO    carisoprodol (SOMA) tablet 350 mg, 350 mg, Oral, Q8H PRN, Hitesh Vallecillo MD    docusate sodium (COLACE) capsule 100 mg, 100 mg, Oral, BID, Patrick Kurtz MD, 100 mg at 11/25/23 1218    ertapenem (INVanz) 1,000 mg in sodium chloride 0.9 % 100 mL IVPB, 1,000 mg, Intravenous, Q24H, Eric Sidhu MD, Last Rate: 200 mL/hr at 11/24/23 2106, 1,000 mg at 11/24/23 2106    famotidine (PEPCID) tablet 40 mg, 40 mg, Oral, Daily, Hitesh Vallecillo MD, 40 mg at 11/25/23 0741    fluconazole (DIFLUCAN) IVPB 400 mg, 400 mg, Intravenous, Q24H, Eric Sidhu MD, Last Rate: 100 mL/hr at 11/24/23 1150, 400 mg at 11/24/23 1150    heparin (porcine) 5000 UNIT/ML injection 5,000 Units, 5,000 Units, Subcutaneous, Q8H, Hitesh Vallecillo MD, 5,000 Units at 11/23/23 2120    hydrALAZINE (APRESOLINE) injection 10 mg, 10 mg, Intravenous, Q6H PRN, Leena Florentino DO, 10 mg at 11/23/23 1400    HYDROmorphone (DILAUDID) injection 0.2 mg, 0.2 mg, Intravenous, Q2H PRN, Patrick Kurtz MD    labetalol (NORMODYNE,TRANDATE) injection 10 mg, 10 mg, Intravenous, Q4H PRN, Grace Markham DO    lansoprazole (PREVACID SOLUTAB) disintegrating tablet Tablet Delayed Release Dispersible 30 mg, 30 mg, Oral, Ruth Ann SALAZAR Richard David, MD, 30 mg at 11/25/23 0748    Magnesium  Standard Dose Replacement - Follow Nurse / BPA Driven Protocol, , Does not apply, PRN, Grace Markham DO    naloxone (NARCAN) injection 0.1 mg, 0.1 mg, Intravenous, Q5 Min PRN, Maximiliano Jimenez MD    nitroglycerin (NITROSTAT) SL tablet 0.4 mg, 0.4 mg, Sublingual, Q5 Min PRN, Grace Markham DO    ondansetron (ZOFRAN) tablet 4 mg, 4 mg, Oral, Q6H PRN **OR** ondansetron (ZOFRAN) injection 4 mg, 4 mg, Intravenous, Q6H PRN, Grace Markham DO, 4 mg at 11/21/23 2002    oxyCODONE-acetaminophen (PERCOCET) 5-325 MG per tablet 2 tablet, 2 tablet, Oral, Q4H PRN, Patrick Kurtz MD, 2 tablet at 11/25/23 1219    phenol (CHLORASEPTIC) 1.4 % liquid 1 spray, 1 spray, Mouth/Throat, Q2H PRN, Leena Florentino DO    Phosphorus Replacement - Follow Nurse / BPA Driven Protocol, , Does not apply, PRN, Grace Markham DO    Potassium Replacement - Follow Nurse / BPA Driven Protocol, , Does not apply, PRN, Grace Markham DO    povidone-iodine (BETADINE) ointment 1 application , 1 application , Topical, Q24H, Hitesh Vallecillo MD, 1 application  at 11/23/23 1112    prochlorperazine (COMPAZINE) injection 10 mg, 10 mg, Intravenous, Q6H PRN, Richard Flores PA-C, 10 mg at 11/22/23 0324    Sodium Chloride (PF) 0.9 % 10 mL, 10 mL, Intravenous, PRN, Hitesh Vallecillo MD    [COMPLETED] Insert Peripheral IV, , , Once **AND** sodium chloride 0.9 % flush 10 mL, 10 mL, Intravenous, PRN, Hitesh Vallecillo MD    sodium chloride 0.9 % flush 10 mL, 10 mL, Intravenous, Q12H, Grace Markham DO, 10 mL at 11/24/23 2109    sodium chloride 0.9 % flush 10 mL, 10 mL, Intravenous, PRN, Grace Markham, , 10 mL at 11/22/23 1420    sodium chloride 0.9 % infusion 40 mL, 40 mL, Intravenous, PRN, Grace Markham,     sodium chloride 0.9 % infusion, 75 mL/hr, Intravenous, Continuous, Hitesh Vallecillo MD, Last Rate: 75 mL/hr at 11/25/23 1221, 75 mL/hr at 11/25/23 1221    sodium chloride 0.9 % infusion,  "30 mL/hr, Intravenous, Continuous PRN, Maximiliano Jimenez MD    triamterene-hydrochlorothiazide (MAXZIDE-25) 37.5-25 MG per tablet 1 tablet, 1 tablet, Oral, Daily, Leena Florentino DO, 1 tablet at 11/24/23 0833    Please refer to the medical record for a full medication list    Review of Systems:    As above    Physical Exam:   Vital Signs   Temp:  [97.1 °F (36.2 °C)-98.1 °F (36.7 °C)] 97.2 °F (36.2 °C)  Heart Rate:  [66-94] 74  Resp:  [16-18] 18  BP: (115-151)/(78-98) 145/93    Temp  Min: 97.1 °F (36.2 °C)  Max: 98.1 °F (36.7 °C)  BP  Min: 115/78  Max: 151/87  Pulse  Min: 66  Max: 94  Resp  Min: 16  Max: 18  SpO2  Min: 94 %  Max: 96 %    Blood pressure 145/93, pulse 74, temperature 97.2 °F (36.2 °C), temperature source Oral, resp. rate 18, height 180.3 cm (71\"), weight 88.5 kg (195 lb), SpO2 95%.  GENERAL: Somnolent but wakes up easily.  No acute distress. Appears stated age.    HEENT:  Normocephalic, atraumatic.  No external oral lesions noted. NG tube removed  EYES:  No conjunctival injection. No icterus.   HEART: RRR, no murmur  LUNGS: Clear to auscultation and percussion. No respiratory distress, no use of accessory muscles.  ABDOMEN: Soft, Moderately distended, colostomy in place in the left upper quadrant. Abdominal tenderness is improving. Right upper quadrant drain in place with a significant amount of serous drainage. No appreciable HSM.    GENITAL: no Pitt catheter  SKIN: no generalized rashes.    PSYCHIATRIC: cooperative.  Appropriate mood and affect  EXT:  No cellulitic change.  NEURO: awake and alert.  Oriented ×4.  Normal speech and cognition    Results Review:   I reviewed the patient's new clinical results.  I reviewed the patient's new imaging results and agree with the interpretation.  I reviewed the patient's other test results and agree with the interpretation    Results from last 7 days   Lab Units 11/25/23  0359 11/24/23  0428 11/23/23  0337   WBC 10*3/mm3 7.48 8.89 10.38   HEMOGLOBIN g/dL " "12.9* 13.9 14.5   HEMATOCRIT % 37.7 41.1 42.6   PLATELETS 10*3/mm3 327 342 327     Results from last 7 days   Lab Units 11/25/23  0359   SODIUM mmol/L 140   POTASSIUM mmol/L 4.1   CHLORIDE mmol/L 104   CO2 mmol/L 27.0   BUN mg/dL 10   CREATININE mg/dL 0.72*   GLUCOSE mg/dL 87   CALCIUM mg/dL 8.1*     Results from last 7 days   Lab Units 11/25/23  0359   ALK PHOS U/L 39   BILIRUBIN mg/dL 0.6   ALT (SGPT) U/L 22   AST (SGOT) U/L 28                 Results from last 7 days   Lab Units 11/20/23  1145   LACTATE mmol/L 1.0     Estimated Creatinine Clearance: 141.7 mL/min (A) (by C-G formula based on SCr of 0.72 mg/dL (L)).     Procalitonin Results:      Lab 11/20/23  1145   PROCALCITONIN 0.34*      Brief Urine Lab Results  (Last result in the past 365 days)        Color   Clarity   Blood   Leuk Est   Nitrite   Protein   CREAT   Urine HCG        11/20/23 1147 Yellow   Clear   Negative   Small (1+)   Negative   Negative                  No results found for: \"SITE\", \"ALLENTEST\", \"PHART\", \"DWF7FQW\", \"PO2ART\", \"INB4YNJ\", \"BASEEXCESS\", \"D9WFSUNG\", \"HGBBG\", \"HCTABG\", \"OXYHEMOGLOBI\", \"METHHGBN\", \"CARBOXYHGB\", \"CO2CT\", \"BAROMETRIC\", \"MODALITY\", \"FIO2\"     Microbiology:  Blood culture ×2: No growth to date      Radiology:  Imaging Results (Last 72 Hours)       ** No results found for the last 72 hours. **            IMPRESSION:     Problems:  Perforated sigmoid diverticulitis with intra-abdominal abscess-status post exploratory laparotomy, sigmoid colectomy with colostomy creation, and drainage of intra-abdominal abscess with drain placement and incidental appendectomy on 11/20/23 by Dr. Vallecillo  Leukocytosis-Likely secondary to postoperative leukemoid reaction.  Now improved  Hypertension    RECOMMENDATIONS:    -Continue to monitor CBC and CMP closely for now  -Follow pending blood cultures-No growth   -diet is being advanced by surgery  -Continue Invanz 1 g IV every 24 hours  -Continue fluconazole 400 mg IV every 24 hours. Can " be changed to by mouth when he is eating/tolerating by mouth meds  -Plan for PICC line placement Tomorrow morning    Clinically improving. NG tube removed. Still has 1 DEBO drain in place. No fevers. Abdominal pain improving. No fevers. Leukocytosis improved.    Tentative Antibiotic plan at discharge    UM/ROMA:  Ertapenem 1g IV q24h. Plan to continue this antibiotic at least until 12/4/23.  Fluconazole 400 mg by mouth daily.  Plan to continue this antifungal at least until 12/4/23.  Weekly CBC, CMP, CRP  Change the PICC line dressing weekly with a Biopatch or Tegaderm CHG gel dressing   Follow-up in my clinic on 12/4/23.  Please fax a copy of my orders to [facility] at [phone] and call [phone] with final discharge plans       Thank you for asking me to see Lobo Yu.     Complex MDM    Eric Sidhu MD  11/25/2023

## 2023-11-26 LAB
ANION GAP SERPL CALCULATED.3IONS-SCNC: 8 MMOL/L (ref 5–15)
BASOPHILS # BLD AUTO: 0.07 10*3/MM3 (ref 0–0.2)
BASOPHILS NFR BLD AUTO: 0.9 % (ref 0–1.5)
BUN SERPL-MCNC: 8 MG/DL (ref 6–20)
BUN/CREAT SERPL: 10.7 (ref 7–25)
CALCIUM SPEC-SCNC: 8.8 MG/DL (ref 8.6–10.5)
CHLORIDE SERPL-SCNC: 103 MMOL/L (ref 98–107)
CO2 SERPL-SCNC: 29 MMOL/L (ref 22–29)
CREAT SERPL-MCNC: 0.75 MG/DL (ref 0.76–1.27)
DEPRECATED RDW RBC AUTO: 42.7 FL (ref 37–54)
EGFRCR SERPLBLD CKD-EPI 2021: 105.3 ML/MIN/1.73
EOSINOPHIL # BLD AUTO: 0.23 10*3/MM3 (ref 0–0.4)
EOSINOPHIL NFR BLD AUTO: 3 % (ref 0.3–6.2)
ERYTHROCYTE [DISTWIDTH] IN BLOOD BY AUTOMATED COUNT: 12.5 % (ref 12.3–15.4)
GLUCOSE SERPL-MCNC: 94 MG/DL (ref 65–99)
HCT VFR BLD AUTO: 42.3 % (ref 37.5–51)
HGB BLD-MCNC: 14.3 G/DL (ref 13–17.7)
IMM GRANULOCYTES # BLD AUTO: 0.07 10*3/MM3 (ref 0–0.05)
IMM GRANULOCYTES NFR BLD AUTO: 0.9 % (ref 0–0.5)
LYMPHOCYTES # BLD AUTO: 2.95 10*3/MM3 (ref 0.7–3.1)
LYMPHOCYTES NFR BLD AUTO: 38.9 % (ref 19.6–45.3)
MCH RBC QN AUTO: 31.4 PG (ref 26.6–33)
MCHC RBC AUTO-ENTMCNC: 33.8 G/DL (ref 31.5–35.7)
MCV RBC AUTO: 92.8 FL (ref 79–97)
MONOCYTES # BLD AUTO: 0.76 10*3/MM3 (ref 0.1–0.9)
MONOCYTES NFR BLD AUTO: 10 % (ref 5–12)
NEUTROPHILS NFR BLD AUTO: 3.51 10*3/MM3 (ref 1.7–7)
NEUTROPHILS NFR BLD AUTO: 46.3 % (ref 42.7–76)
NRBC BLD AUTO-RTO: 0 /100 WBC (ref 0–0.2)
PLATELET # BLD AUTO: 347 10*3/MM3 (ref 140–450)
PMV BLD AUTO: 9.2 FL (ref 6–12)
POTASSIUM SERPL-SCNC: 3.9 MMOL/L (ref 3.5–5.2)
RBC # BLD AUTO: 4.56 10*6/MM3 (ref 4.14–5.8)
SODIUM SERPL-SCNC: 140 MMOL/L (ref 136–145)
WBC NRBC COR # BLD AUTO: 7.59 10*3/MM3 (ref 3.4–10.8)

## 2023-11-26 PROCEDURE — 80048 BASIC METABOLIC PNL TOTAL CA: CPT | Performed by: INTERNAL MEDICINE

## 2023-11-26 PROCEDURE — 25010000002 ERTAPENEM PER 500 MG: Performed by: INTERNAL MEDICINE

## 2023-11-26 PROCEDURE — 25010000002 FLUCONAZOLE PER 200 MG: Performed by: INTERNAL MEDICINE

## 2023-11-26 PROCEDURE — 99232 SBSQ HOSP IP/OBS MODERATE 35: CPT | Performed by: INTERNAL MEDICINE

## 2023-11-26 PROCEDURE — 25010000002 HEPARIN (PORCINE) PER 1000 UNITS: Performed by: SURGERY

## 2023-11-26 PROCEDURE — 85025 COMPLETE CBC W/AUTO DIFF WBC: CPT | Performed by: INTERNAL MEDICINE

## 2023-11-26 PROCEDURE — 25810000003 SODIUM CHLORIDE 0.9 % SOLUTION: Performed by: SURGERY

## 2023-11-26 RX ADMIN — OXYCODONE HYDROCHLORIDE AND ACETAMINOPHEN 2 TABLET: 5; 325 TABLET ORAL at 05:52

## 2023-11-26 RX ADMIN — DOCUSATE SODIUM 100 MG: 100 CAPSULE, LIQUID FILLED ORAL at 09:25

## 2023-11-26 RX ADMIN — Medication 10 ML: at 09:26

## 2023-11-26 RX ADMIN — TRIAMTERENE AND HYDROCHLOROTHIAZIDE 1 TABLET: 37.5; 25 TABLET ORAL at 09:24

## 2023-11-26 RX ADMIN — HEPARIN SODIUM 5000 UNITS: 5000 INJECTION INTRAVENOUS; SUBCUTANEOUS at 21:56

## 2023-11-26 RX ADMIN — OXYCODONE HYDROCHLORIDE AND ACETAMINOPHEN 2 TABLET: 5; 325 TABLET ORAL at 17:24

## 2023-11-26 RX ADMIN — SODIUM CHLORIDE 75 ML/HR: 9 INJECTION, SOLUTION INTRAVENOUS at 04:43

## 2023-11-26 RX ADMIN — ASPIRIN 81 MG: 81 TABLET, COATED ORAL at 09:24

## 2023-11-26 RX ADMIN — POVIDONE-IODINE 1 APPLICATION: 100 OINTMENT TOPICAL at 12:00

## 2023-11-26 RX ADMIN — Medication 10 ML: at 20:00

## 2023-11-26 RX ADMIN — FAMOTIDINE 40 MG: 20 TABLET, FILM COATED ORAL at 09:24

## 2023-11-26 RX ADMIN — ATENOLOL 50 MG: 50 TABLET ORAL at 09:24

## 2023-11-26 RX ADMIN — LANSOPRAZOLE 30 MG: 15 TABLET, ORALLY DISINTEGRATING, DELAYED RELEASE ORAL at 05:52

## 2023-11-26 RX ADMIN — ERTAPENEM 1000 MG: 1 INJECTION INTRAMUSCULAR; INTRAVENOUS at 21:57

## 2023-11-26 RX ADMIN — FLUCONAZOLE 400 MG: 400 INJECTION, SOLUTION INTRAVENOUS at 11:22

## 2023-11-26 RX ADMIN — DOCUSATE SODIUM 100 MG: 100 CAPSULE, LIQUID FILLED ORAL at 21:56

## 2023-11-26 RX ADMIN — OXYCODONE HYDROCHLORIDE AND ACETAMINOPHEN 2 TABLET: 5; 325 TABLET ORAL at 21:56

## 2023-11-26 RX ADMIN — OXYCODONE HYDROCHLORIDE AND ACETAMINOPHEN 2 TABLET: 5; 325 TABLET ORAL at 09:56

## 2023-11-26 NOTE — PROGRESS NOTES
"Patient Name:  Lobo Yu  YOB: 1965  [id number]    Surgery Progress Note    Date of visit: 11/26/2023      Subjective: No acute events overnight.  Pain has been well controlled.  Tolerated fulls without difficulty.  No nausea or vomiting.  Having good colostomy output at this point.  Ambulated          Objective:     /93 (BP Location: Left arm, Patient Position: Lying)   Pulse 57   Temp 96.5 °F (35.8 °C) (Oral)   Resp 18   Ht 180.3 cm (71\")   Wt 88.5 kg (195 lb)   SpO2 99%   BMI 27.20 kg/m²     Intake/Output Summary (Last 24 hours) at 11/26/2023 0943  Last data filed at 11/26/2023 0442  Gross per 24 hour   Intake --   Output 160 ml   Net -160 ml       GEN:   Awake, alert, in no acute distress, resting comfortably in bed   CV:   Regular rate and rhythm  L:  Symmetric expansion, not labored on room air  Abd:  Soft, appropriately tender palpation along incision, incision is clean dry intact, colostomy left side is pink and patent with stool in the bag, DEBO in the right lower quadrant is serosanguineous  Ext:  No cyanosis, clubbing, or edema    Recent labs that are back at this time have been reviewed.           Assessment/ Plan:    Mr. Yu is a 58-year-old gentleman who was admitted following laparotomy with sigmoid colectomy and end colostomy creation on November 20 by Dr. Vallecillo for perforated diverticulitis     #Perforated diverticulitis  -Postop day 6 following Granados's  -Vital signs are stable.  Labs without acute findings  -Now having colostomy output  -Advance to regular diet  -Continue antibiotics  -Mobilize         Patrick Kurtz MD  11/26/2023  09:43 EST      "

## 2023-11-26 NOTE — PROGRESS NOTES
[facility] Medicine Services  PROGRESS NOTE    Patient Name: Lobo uY  : 1965  MRN: [id number]    Date of Admission: 2023  Primary Care Physician: Alissa Tony DO    Subjective   Subjective     CC:  Abdominal Pain    HPI:  Pt doing well this am, had stool in ostomy starting last night.  Denies any issues overnight.       Objective   Objective     Vital Signs:   Temp:  [96.5 °F (35.8 °C)-98 °F (36.7 °C)] 96.5 °F (35.8 °C)  Heart Rate:  [57-84] 57  Resp:  [18] 18  BP: (128-153)/(82-93) 133/93     Physical Exam:  Constitutional: in pain, awake, alert  HENT: NCAT, mucous membranes moist  Respiratory: Clear to auscultation bilaterally, respiratory effort normal   Cardiovascular: RRR, no murmurs, rubs, or gallops  Gastrointestinal: negative bowel sounds, soft, nondistended, DEBO stable,ostomy bag in place with serous fluid  Musculoskeletal: No bilateral ankle edema  Psychiatric: Appropriate affect, cooperative  Neurologic: Oriented x 3, strength symmetric in all extremities, Cranial Nerves grossly intact to confrontation, speech clear  Skin: No rashes, incision c/d/I, RUE with PICC line in place    Results Reviewed:  LAB RESULTS:      Lab 23  0458 23  0359 23  0428 23  0337 23  0318 23  0528 23  1145   WBC 7.59 7.48 8.89 10.38 13.69* 12.63* 10.11   HEMOGLOBIN 14.3 12.9* 13.9 14.5 14.3 13.0 14.0   HEMATOCRIT 42.3 37.7 41.1 42.6 42.6 38.7 41.5   PLATELETS 347 327 342 327 345 293 285   NEUTROS ABS 3.51  --   --   --   --  10.63* 7.41*   IMMATURE GRANS (ABS) 0.07*  --   --   --   --  0.07* 0.06*   LYMPHS ABS 2.95  --   --   --   --  0.68* 1.30   MONOS ABS 0.76  --   --   --   --  1.24* 1.18*   EOS ABS 0.23  --   --   --   --  0.00 0.14   MCV 92.8 91.3 91.7 92.2 93.6 92.8 93.7   PROCALCITONIN  --   --   --   --   --   --  0.34*   LACTATE  --   --   --   --   --   --  1.0         Lab 23  0458 23  0359 23  0428  11/23/23  0337 11/22/23  0318   SODIUM 140 140 140 140 138   POTASSIUM 3.9 4.1 3.8 4.1 3.9   CHLORIDE 103 104 105 103 100   CO2 29.0 27.0 25.0 26.0 28.0   ANION GAP 8.0 9.0 10.0 11.0 10.0   BUN 8 10 14 13 14   CREATININE 0.75* 0.72* 0.69* 0.81 0.93   EGFR 105.3 106.6 107.9 102.8 95.8   GLUCOSE 94 87 107* 97 105*   CALCIUM 8.8 8.1* 8.0* 8.4* 8.4*         Lab 11/25/23  0359 11/24/23  0428 11/23/23  0337 11/22/23  0318 11/21/23  0528 11/20/23  1145   TOTAL PROTEIN 5.3* 5.1* 5.9* 6.7 6.1 7.4   ALBUMIN 2.9* 3.1* 3.1* 3.5 3.6 4.0   GLOBULIN 2.4 2.0 2.8 3.2 2.5 3.4   ALT (SGPT) 22 15 15 16 18 19   AST (SGOT) 28 16 18 21 20 16   BILIRUBIN 0.6 0.6 0.6 0.6 0.6 0.8   ALK PHOS 39 37* 44 52 47 71   LIPASE  --   --   --   --   --  49                 Lab 11/20/23  1525   ABO TYPING O   RH TYPING Negative   ANTIBODY SCREEN Negative         Brief Urine Lab Results  (Last result in the past 365 days)        Color   Clarity   Blood   Leuk Est   Nitrite   Protein   CREAT   Urine HCG        11/20/23 1147 Yellow   Clear   Negative   Small (1+)   Negative   Negative                   Microbiology Results Abnormal       Procedure Component Value - Date/Time    Blood Culture - Blood, Arm, Right [[id number]]  (Normal) Collected: 11/20/23 1501    Lab Status: Final result Specimen: Blood from Arm, Right Updated: 11/25/23 1532     Blood Culture No growth at 5 days    Blood Culture - Blood, Arm, Right [[id number]]  (Normal) Collected: 11/20/23 1430    Lab Status: Final result Specimen: Blood from Arm, Right Updated: 11/25/23 1502     Blood Culture No growth at 5 days            No radiology results from the last 24 hrs        Current medications:  Scheduled Meds:aspirin, 81 mg, Oral, Daily  atenolol, 50 mg, Oral, Daily  bisacodyl, 10 mg, Oral, Daily  docusate sodium, 100 mg, Oral, BID  ertapenem, 1,000 mg, Intravenous, Q24H  famotidine, 40 mg, Oral, Daily  fluconazole, 400 mg, Intravenous, Q24H  heparin (porcine), 5,000 Units, Subcutaneous,  Q8H  lansoprazole, 30 mg, Oral, QAM AC  povidone-iodine, 1 application , Topical, Q24H  sodium chloride, 10 mL, Intravenous, Q12H  sodium chloride, 10 mL, Intravenous, Q12H  triamterene-hydrochlorothiazide, 1 tablet, Oral, Daily      Continuous Infusions:sodium chloride, 30 mL/hr      PRN Meds:.  acetaminophen **OR** [DISCONTINUED] acetaminophen **OR** [DISCONTINUED] acetaminophen    Calcium Replacement - Follow Nurse / BPA Driven Protocol    carisoprodol    hydrALAZINE    HYDROmorphone    labetalol    Magnesium Standard Dose Replacement - Follow Nurse / BPA Driven Protocol    naloxone    nitroglycerin    ondansetron **OR** ondansetron    oxyCODONE-acetaminophen    phenol    Phosphorus Replacement - Follow Nurse / BPA Driven Protocol    Potassium Replacement - Follow Nurse / BPA Driven Protocol    prochlorperazine    Sodium Chloride (PF)    [COMPLETED] Insert Peripheral IV **AND** sodium chloride    sodium chloride    sodium chloride    sodium chloride    sodium chloride    sodium chloride    Assessment & Plan   Assessment & Plan     Active Hospital Problems    Diagnosis  POA    **Perforated diverticulum [K57.80]  Yes    Essential hypertension [I10]  Unknown    Pulmonary hypertension [I27.20]  Unknown      Resolved Hospital Problems   No resolved problems to display.        Brief Hospital Course to date:  Lobo Yu is a 58 y.o. male with PMHx HTN and colon polyps who presented with increased abdominal pain. In ED, CT imaging with acute sigmoid diverticulitis with renato perforation with pneumoperitoneum. General Surgery, Dr Vallecillo, evaluated in ED, and took the patient urgently to the OR on 11/20/23.     This patient's problems and plans were partially entered by my partner and updated as appropriate by me 11/26/23.    Acute Sigmoid Diverticulitis with perforation & Pneumoperitoneum   Hx Diverticulosis   S/P Colonoscopy with polyp removal x3 11/6/23  -POD 5 s/p sigmoid colectomy with end colostomy and  abscess drainage per Dr. Vallecillo  -IV ABX- Invanz 1gm IV Q24H/Fluconazole 400mg IV Q24H. PICC in place for IV Invanz upon d/c, plan for PO Fluconazole upon d/c. IV ABX through 12/4 and follow up with Dr. VASILIY Sidhu on 12/4.   - ID following  - d/c'd NG 11/24  - advance to regular diet today  - ok to ambulate  -now having ostomy output  -Pain Control-  continue with Oral pain meds with IV dilaudid for breakthrough   -Blood Cx negative to date  - prn Thorazine for hiccups     HTN  -Resumed home meds   -PRN IV Labetalol     Expected Discharge Location and Transportation: home if tolerates regular diet  Expected Discharge   Expected Discharge Date: 11/27/2023; Expected Discharge Time:      DVT prophylaxis:  Medical and mechanical DVT prophylaxis orders are present.     AM-PAC 6 Clicks Score (PT): 23 (11/25/23 2000)    CODE STATUS:   Code Status and Medical Interventions:   Ordered at: 11/20/23 2017     Level Of Support Discussed With:    Patient     Code Status (Patient has no pulse and is not breathing):    CPR (Attempt to Resuscitate)     Medical Interventions (Patient has pulse or is breathing):    Full Support       Nazia Avalos MD  11/26/23

## 2023-11-26 NOTE — PLAN OF CARE
Goal Outcome Evaluation:      No issues overnight. PRN percocet given once for abdominal pain. Liquid stool noted in colostomy this morning. DEBO 70mls out. VSS. Will continue to monitor.

## 2023-11-27 ENCOUNTER — READMISSION MANAGEMENT (OUTPATIENT)
Dept: CALL CENTER | Facility: HOSPITAL | Age: 58
End: 2023-11-27
Payer: COMMERCIAL

## 2023-11-27 ENCOUNTER — DOCUMENTATION (OUTPATIENT)
Dept: HOME HEALTH SERVICES | Facility: HOME HEALTHCARE | Age: 58
End: 2023-11-27
Payer: COMMERCIAL

## 2023-11-27 ENCOUNTER — HOME HEALTH ADMISSION (OUTPATIENT)
Dept: HOME HEALTH SERVICES | Facility: HOME HEALTHCARE | Age: 58
End: 2023-11-27
Payer: COMMERCIAL

## 2023-11-27 VITALS
OXYGEN SATURATION: 99 % | BODY MASS INDEX: 27.3 KG/M2 | HEIGHT: 71 IN | HEART RATE: 89 BPM | DIASTOLIC BLOOD PRESSURE: 84 MMHG | RESPIRATION RATE: 18 BRPM | WEIGHT: 195 LBS | SYSTOLIC BLOOD PRESSURE: 118 MMHG | TEMPERATURE: 98.4 F

## 2023-11-27 LAB
ANION GAP SERPL CALCULATED.3IONS-SCNC: 7 MMOL/L (ref 5–15)
BASOPHILS # BLD AUTO: 0.05 10*3/MM3 (ref 0–0.2)
BASOPHILS NFR BLD AUTO: 0.6 % (ref 0–1.5)
BUN SERPL-MCNC: 9 MG/DL (ref 6–20)
BUN/CREAT SERPL: 9.6 (ref 7–25)
CALCIUM SPEC-SCNC: 8.7 MG/DL (ref 8.6–10.5)
CHLORIDE SERPL-SCNC: 101 MMOL/L (ref 98–107)
CO2 SERPL-SCNC: 30 MMOL/L (ref 22–29)
CREAT SERPL-MCNC: 0.94 MG/DL (ref 0.76–1.27)
DEPRECATED RDW RBC AUTO: 41.1 FL (ref 37–54)
EGFRCR SERPLBLD CKD-EPI 2021: 94.6 ML/MIN/1.73
EOSINOPHIL # BLD AUTO: 0.22 10*3/MM3 (ref 0–0.4)
EOSINOPHIL NFR BLD AUTO: 2.7 % (ref 0.3–6.2)
ERYTHROCYTE [DISTWIDTH] IN BLOOD BY AUTOMATED COUNT: 12.3 % (ref 12.3–15.4)
GLUCOSE SERPL-MCNC: 99 MG/DL (ref 65–99)
HCT VFR BLD AUTO: 39.6 % (ref 37.5–51)
HGB BLD-MCNC: 13.6 G/DL (ref 13–17.7)
IMM GRANULOCYTES # BLD AUTO: 0.06 10*3/MM3 (ref 0–0.05)
IMM GRANULOCYTES NFR BLD AUTO: 0.7 % (ref 0–0.5)
LYMPHOCYTES # BLD AUTO: 2.67 10*3/MM3 (ref 0.7–3.1)
LYMPHOCYTES NFR BLD AUTO: 32.9 % (ref 19.6–45.3)
MCH RBC QN AUTO: 31.3 PG (ref 26.6–33)
MCHC RBC AUTO-ENTMCNC: 34.3 G/DL (ref 31.5–35.7)
MCV RBC AUTO: 91.2 FL (ref 79–97)
MONOCYTES # BLD AUTO: 0.77 10*3/MM3 (ref 0.1–0.9)
MONOCYTES NFR BLD AUTO: 9.5 % (ref 5–12)
NEUTROPHILS NFR BLD AUTO: 4.34 10*3/MM3 (ref 1.7–7)
NEUTROPHILS NFR BLD AUTO: 53.6 % (ref 42.7–76)
NRBC BLD AUTO-RTO: 0 /100 WBC (ref 0–0.2)
PLATELET # BLD AUTO: 327 10*3/MM3 (ref 140–450)
PMV BLD AUTO: 9 FL (ref 6–12)
POTASSIUM SERPL-SCNC: 4.3 MMOL/L (ref 3.5–5.2)
RBC # BLD AUTO: 4.34 10*6/MM3 (ref 4.14–5.8)
SODIUM SERPL-SCNC: 138 MMOL/L (ref 136–145)
WBC NRBC COR # BLD AUTO: 8.11 10*3/MM3 (ref 3.4–10.8)

## 2023-11-27 PROCEDURE — 85025 COMPLETE CBC W/AUTO DIFF WBC: CPT | Performed by: INTERNAL MEDICINE

## 2023-11-27 PROCEDURE — 25010000002 HYDROMORPHONE PER 4 MG: Performed by: SURGERY

## 2023-11-27 PROCEDURE — 25010000002 FLUCONAZOLE PER 200 MG: Performed by: INTERNAL MEDICINE

## 2023-11-27 PROCEDURE — 99239 HOSP IP/OBS DSCHRG MGMT >30: CPT | Performed by: INTERNAL MEDICINE

## 2023-11-27 PROCEDURE — 25010000002 ERTAPENEM PER 500 MG: Performed by: INTERNAL MEDICINE

## 2023-11-27 PROCEDURE — 80048 BASIC METABOLIC PNL TOTAL CA: CPT | Performed by: INTERNAL MEDICINE

## 2023-11-27 RX ORDER — NALOXONE HYDROCHLORIDE 4 MG/.1ML
SPRAY NASAL
Qty: 2 EACH | Refills: 0 | Status: SHIPPED | OUTPATIENT
Start: 2023-11-27

## 2023-11-27 RX ORDER — OXYCODONE HYDROCHLORIDE AND ACETAMINOPHEN 5; 325 MG/1; MG/1
2 TABLET ORAL EVERY 4 HOURS PRN
Qty: 36 TABLET | Refills: 0 | Status: SHIPPED | OUTPATIENT
Start: 2023-11-27 | End: 2023-12-05

## 2023-11-27 RX ORDER — POLYETHYLENE GLYCOL 3350 17 G/17G
17 POWDER, FOR SOLUTION ORAL DAILY
Qty: 30 EACH | Refills: 0 | Status: SHIPPED | OUTPATIENT
Start: 2023-11-27 | End: 2023-11-28

## 2023-11-27 RX ORDER — ONDANSETRON 4 MG/1
4 TABLET, FILM COATED ORAL EVERY 6 HOURS PRN
Qty: 30 TABLET | Refills: 0 | Status: SHIPPED | OUTPATIENT
Start: 2023-11-27

## 2023-11-27 RX ORDER — POLYETHYLENE GLYCOL 3350 17 G/17G
17 POWDER, FOR SOLUTION ORAL DAILY
Status: DISCONTINUED | OUTPATIENT
Start: 2023-11-27 | End: 2023-11-27 | Stop reason: HOSPADM

## 2023-11-27 RX ORDER — DOCUSATE SODIUM 100 MG/1
100 CAPSULE, LIQUID FILLED ORAL NIGHTLY
Qty: 30 CAPSULE | Refills: 0 | Status: SHIPPED | OUTPATIENT
Start: 2023-11-27 | End: 2023-11-28

## 2023-11-27 RX ORDER — FLUCONAZOLE 200 MG/1
400 TABLET ORAL DAILY
Qty: 14 TABLET | Refills: 0 | Status: SHIPPED | OUTPATIENT
Start: 2023-11-27 | End: 2023-12-04

## 2023-11-27 RX ADMIN — POLYETHYLENE GLYCOL 3350 17 G: 17 POWDER, FOR SOLUTION ORAL at 11:46

## 2023-11-27 RX ADMIN — ASPIRIN 81 MG: 81 TABLET, COATED ORAL at 09:04

## 2023-11-27 RX ADMIN — OXYCODONE HYDROCHLORIDE AND ACETAMINOPHEN 2 TABLET: 5; 325 TABLET ORAL at 06:03

## 2023-11-27 RX ADMIN — FAMOTIDINE 40 MG: 20 TABLET, FILM COATED ORAL at 09:04

## 2023-11-27 RX ADMIN — FLUCONAZOLE 400 MG: 400 INJECTION, SOLUTION INTRAVENOUS at 11:46

## 2023-11-27 RX ADMIN — ATENOLOL 50 MG: 50 TABLET ORAL at 09:05

## 2023-11-27 RX ADMIN — DOCUSATE SODIUM 100 MG: 100 CAPSULE, LIQUID FILLED ORAL at 09:04

## 2023-11-27 RX ADMIN — POVIDONE-IODINE 1 APPLICATION: 100 OINTMENT TOPICAL at 11:47

## 2023-11-27 RX ADMIN — HYDROMORPHONE HYDROCHLORIDE 0.2 MG: 1 INJECTION, SOLUTION INTRAMUSCULAR; INTRAVENOUS; SUBCUTANEOUS at 11:45

## 2023-11-27 RX ADMIN — TRIAMTERENE AND HYDROCHLOROTHIAZIDE 1 TABLET: 37.5; 25 TABLET ORAL at 09:04

## 2023-11-27 RX ADMIN — ERTAPENEM 1000 MG: 1 INJECTION INTRAMUSCULAR; INTRAVENOUS at 13:19

## 2023-11-27 RX ADMIN — OXYCODONE HYDROCHLORIDE AND ACETAMINOPHEN 2 TABLET: 5; 325 TABLET ORAL at 14:13

## 2023-11-27 RX ADMIN — BISACODYL 10 MG: 5 TABLET, COATED ORAL at 09:04

## 2023-11-27 NOTE — CASE MANAGEMENT/SOCIAL WORK
Case Management Discharge Note      Final Note: I spoke with this patient regarding his discharge home today with [facility] for  for his ostomy care. He is in agreement with this plan. He understands that an [facility] will come to the bedside today for teach and dispense of his antibiotic and he is to go to the [facility] office once weekly for PICC dressing change and labs. His follow up appointment at [facility] office information was given to him and placed in the AVS. He also has a new patient appointment with his new PCP for tomorrow and needs to make the appointment to receive home health care. His family can transport. He denies having any further discharge planning needs at this time.         Selected Continued Care - Admitted Since 11/20/2023       Destination    No services have been selected for the patient.                Durable Medical Equipment    No services have been selected for the patient.                Dialysis/Infusion Coordination complete.      Service Provider Selected Services Address Phone Fax Patient Preferred    [facility] [address], [facility] [address] [phone] --              Home Medical Care Coordination complete.      Service Provider Selected Services Address Phone Fax Patient Preferred    [facility] [address], [facility] [address] [phone] --              Therapy    No services have been selected for the patient.                [facility]    No services have been selected for the patient.                [facility]    No services have been selected for the patient.                         Final Discharge Disposition Code: 06 - home with home health care

## 2023-11-27 NOTE — OUTREACH NOTE
Prep Survey      Flowsheet Row Responses   [facility] facility patient discharged from? [place]   Is LACE score < 7 ? No   Eligibility [facility]   Date of Admission 11/20/23   Date of Discharge 11/27/23   Discharge Disposition Home-Health Care Svc   Discharge diagnosis perforated sigmoid diverticulitis, EXPLORATORY LAPAROTOMY, SIGMOID COLECTOMY WITH COLOSTOMY CREAION, INTRA-ABDOMINAL ABSCESS DRAINAGE, APPENDECTOMY   Does the patient have one of the following disease processes/diagnoses(primary or secondary)? General Surgery   Does the patient have Home health ordered? Yes   What is the Home health agency?  [facility], IV abx   Is there a DME ordered? No   Comments regarding appointments [facility] weekly for PICC dressing change & labs   Prep survey completed? Yes            Afia BEANVIDES - Registered Nurse

## 2023-11-27 NOTE — PROGRESS NOTES
Met with patient to review home health. He will be getting IV abx via [facility] and [facility] will be following patient for ostomy instruction. He has a new PCP appt tomorrow with Alissa Tony and Dr Hitesh Vallecillo will follow for home care until patient can be seen by PCP per p/c to Amelia at MD office. Rigo RODRIGUEZ([facility])Hospital Liaison

## 2023-11-27 NOTE — PROGRESS NOTES
"General Surgery Post op Follow Up Note    Subjective:   No new complaints.  Tolerating p.o. with stoma function.    /84 (BP Location: Left arm, Patient Position: Lying)   Pulse 89   Temp 98.4 °F (36.9 °C) (Oral)   Resp 18   Ht 180.3 cm (71\")   Wt 88.5 kg (195 lb)   SpO2 99%   BMI 27.20 kg/m²     General Appearance: Minimal distress  Abdomen: incision is clean and dry, Telfa jenifer removed.  DEBO drain benign.    CBC  Results from last 7 days   Lab Units 11/27/23  0538   WBC 10*3/mm3 8.11   HEMOGLOBIN g/dL 13.6   HEMATOCRIT % 39.6   PLATELETS 10*3/mm3 327       CMP/BMP  Results from last 7 days   Lab Units 11/27/23  0538 11/26/23  0458 11/25/23  0359   SODIUM mmol/L 138   < > 140   POTASSIUM mmol/L 4.3   < > 4.1   CHLORIDE mmol/L 101   < > 104   CO2 mmol/L 30.0*   < > 27.0   BUN mg/dL 9   < > 10   CREATININE mg/dL 0.94   < > 0.72*   CALCIUM mg/dL 8.7   < > 8.1*   BILIRUBIN mg/dL  --   --  0.6   ALK PHOS U/L  --   --  39   ALT (SGPT) U/L  --   --  22   AST (SGOT) U/L  --   --  28   GLUCOSE mg/dL 99   < > 87    < > = values in this interval not displayed.         ASSESSMENT/PLAN:    S/p sigmoid resection with colostomy creation and intra-abdominal abscess drainage      Appropriate diet as tolerated.  High-fiber.  IV antibiotics per infectious disease, Dr. Sidhu.  [place] the midline incision with Betadine daily.  MiraLAX daily every morning with Colace nightly.    Okay with discharge home.    You may not drive within 24 hour of receiving narcotic pain medication.  You may return to work/school in 2 weeks. Follow restrictions.  Restrictions of no excessive twisting/bending/lifting greater than 15 lbs for 6 weeks.  You may shower after 24 hours DEBO drain and incisional wick removal.   No tub baths, do not submerge the incision underwater in a tub bath etc.    Paint abdominal wound with Betadine daily.  Dress daily to keep clean and dry.    Follow-up with me in 1 to 2 weeks, [phone].    Hitesh Reyes " MD Ruth Ann  11/27/2023  09:38 EST

## 2023-11-27 NOTE — NURSING NOTE
Instructed patient/wife on self administration of IV antibiotics via PICC line for home.  Wife returned demonstration with proficiency.  Reviewed side effects, probiotics, line care, and 24hr. RN availability.  [facility] to provide all IV antibiotics and supplies.  Weekly line care and labs to be done at [facility] office.  Patient to f/u with Dr. LEONOR Sidhu on 12/4/23 @ 1:30pm with labs prior.  [facility] [phone],

## 2023-11-27 NOTE — NURSING NOTE
[facility] follow up for Stoma care and assessment     Surgery date: 11/20        Surgeon:  Ruth Ann     [facility] Care Outcome: Patient seen for ostomy care, assessment and education. Patient awake. Patient seen in the Medical/Surgical Floor .spouse at beside.     Stoma Type: End Colostomy  Diameter/shape: round, 42 mm  Location: LLQ  Protrusion: Budded  Stoma Characteristics: Edematous, round and Red  Peristomal skin: Clean dry intact  Effluent Characteristics: thickening, brown light       Current pouching system: [place] new image, flat, drainable  Accessory products used: Barrier ring  Goal wear time: 5-7 days  Emptying frequency of appliance per day: Recommend emptying when 1/3-1/2 full.   Last appliance change: 11/27/23     Follow up visit summary:  -Stoma viable,good output  Abdomen distended, but improving.  -Protein intake encouraged. Midline incision with staples. Packing Dc'd per MD. Watch for dehiscence   -Ostomy education folder contents reviewed with wife. Including how to order supplies  -Continue to walk  --Wife assisted with appliance change today.  -DC supplies provided. Reviewed how to order supplies from Thomas Hair RN, BSN, CCRN, CWOCN  Wound, Ostomy and Continence (WOC) Department  [facility]

## 2023-11-27 NOTE — DISCHARGE SUMMARY
[facility] Medicine Services  DISCHARGE SUMMARY    Patient Name: Lobo Yu  : 1965  MRN: [id number]    Date of Admission: 2023  1:36 PM  Date of Discharge:  2023  Primary Care Physician: Alissa Tony DO    Consults       Date and Time Order Name Status Description    2023  8:59 AM Inpatient Infectious Diseases Consult Completed     2023  8:17 PM Inpatient Infectious Diseases Consult Completed             Hospital Course     Presenting Problem: perforated diverticulitis    Active Hospital Problems    Diagnosis  POA    **Perforated diverticulum [K57.80]  Yes    Essential hypertension [I10]  Unknown    Pulmonary hypertension [I27.20]  Unknown      Resolved Hospital Problems   No resolved problems to display.          Hospital Course:  Lobo Yu is a 58 y.o. male with PMHx HTN and colon polyps who presented with increased abdominal pain. In ED, CT imaging with acute sigmoid diverticulitis with renato perforation with pneumoperitoneum. General Surgery, Dr Vallecillo, evaluated in ED, and took the patient urgently to the OR on 23.       Acute Sigmoid Diverticulitis with perforation & Pneumoperitoneum   Hx Diverticulosis   S/P Colonoscopy with polyp removal x3 23  -POD 5 s/p sigmoid colectomy with end colostomy and abscess drainage per Dr. Vallecillo  -IV ABX- Invanz 1gm IV Q24H/Fluconazole 400mg IV Q24H. PICC in place for IV Invanz upon d/c, plan for PO Fluconazole upon d/c. IV ABX through  and follow up with Dr. VASILIY Sidhu on .   - ID followed while inpatient   - d/c'd NG   - tolerating regular diet  -now having ostomy output  -Pain Control-  continue with Oral pain meds, prescribed for use upon d/c  -Blood Cx negative to date     HTN  -Resumed home meds       Discharge Follow Up Recommendations for outpatient labs/diagnostics:       Day of Discharge     HPI:   Doing well this am, getting ostomy teaching with WOC at bedside.    No new issues overnight.     Review of Systems  Gen- No fevers, chills  CV- No chest pain, palpitations  Resp- No cough, dyspnea  GI- No N/V/D, abd pain      Vital Signs:   Temp:  [96.7 °F (35.9 °C)-98.6 °F (37 °C)] 98.4 °F (36.9 °C)  Heart Rate:  [62-89] 89  Resp:  [18-20] 18  BP: (118-133)/(81-93) 118/84      Physical Exam:  Constitutional: in pain, awake, alert  HENT: NCAT, mucous membranes moist  Respiratory: Clear to auscultation bilaterally, respiratory effort normal   Cardiovascular: RRR, no murmurs, rubs, or gallops  Gastrointestinal: negative bowel sounds, soft, nondistended, DEBO stable,ostomy   Musculoskeletal: No bilateral ankle edema  Psychiatric: Appropriate affect, cooperative  Neurologic: Oriented x 3, strength symmetric in all extremities, Cranial Nerves grossly intact to confrontation, speech clear  Skin: No rashes, incision c/d/I, RUE with PICC line in place    Pertinent  and/or Most Recent Results     LAB RESULTS:      Lab 11/27/23  0538 11/26/23  0458 11/25/23  0359 11/24/23  0428 11/23/23  0337 11/22/23  0318 11/21/23  0528 11/20/23  1145   WBC 8.11 7.59 7.48 8.89 10.38   < > 12.63* 10.11   HEMOGLOBIN 13.6 14.3 12.9* 13.9 14.5   < > 13.0 14.0   HEMATOCRIT 39.6 42.3 37.7 41.1 42.6   < > 38.7 41.5   PLATELETS 327 347 327 342 327   < > 293 285   NEUTROS ABS 4.34 3.51  --   --   --   --  10.63* 7.41*   IMMATURE GRANS (ABS) 0.06* 0.07*  --   --   --   --  0.07* 0.06*   LYMPHS ABS 2.67 2.95  --   --   --   --  0.68* 1.30   MONOS ABS 0.77 0.76  --   --   --   --  1.24* 1.18*   EOS ABS 0.22 0.23  --   --   --   --  0.00 0.14   MCV 91.2 92.8 91.3 91.7 92.2   < > 92.8 93.7   PROCALCITONIN  --   --   --   --   --   --   --  0.34*   LACTATE  --   --   --   --   --   --   --  1.0    < > = values in this interval not displayed.         Lab 11/27/23  0538 11/26/23  0458 11/25/23  0359 11/24/23  0428 11/23/23  0337   SODIUM 138 140 140 140 140   POTASSIUM 4.3 3.9 4.1 3.8 4.1   CHLORIDE 101 103 104 105 103   CO2  30.0* 29.0 27.0 25.0 26.0   ANION GAP 7.0 8.0 9.0 10.0 11.0   BUN 9 8 10 14 13   CREATININE 0.94 0.75* 0.72* 0.69* 0.81   EGFR 94.6 105.3 106.6 107.9 102.8   GLUCOSE 99 94 87 107* 97   CALCIUM 8.7 8.8 8.1* 8.0* 8.4*         Lab 11/25/23  0359 11/24/23  0428 11/23/23  0337 11/22/23  0318 11/21/23  0528 11/20/23  1145   TOTAL PROTEIN 5.3* 5.1* 5.9* 6.7 6.1 7.4   ALBUMIN 2.9* 3.1* 3.1* 3.5 3.6 4.0   GLOBULIN 2.4 2.0 2.8 3.2 2.5 3.4   ALT (SGPT) 22 15 15 16 18 19   AST (SGOT) 28 16 18 21 20 16   BILIRUBIN 0.6 0.6 0.6 0.6 0.6 0.8   ALK PHOS 39 37* 44 52 47 71   LIPASE  --   --   --   --   --  49                 Lab 11/20/23  1525   ABO TYPING O   RH TYPING Negative   ANTIBODY SCREEN Negative         Brief Urine Lab Results  (Last result in the past 365 days)        Color   Clarity   Blood   Leuk Est   Nitrite   Protein   CREAT   Urine HCG        11/20/23 1147 Yellow   Clear   Negative   Small (1+)   Negative   Negative                 Microbiology Results (last 10 days)       Procedure Component Value - Date/Time    Blood Culture - Blood, Arm, Right [[id number]]  (Normal) Collected: 11/20/23 1501    Lab Status: Final result Specimen: Blood from Arm, Right Updated: 11/25/23 1532     Blood Culture No growth at 5 days    Blood Culture - Blood, Arm, Right [[id number]]  (Normal) Collected: 11/20/23 1430    Lab Status: Final result Specimen: Blood from Arm, Right Updated: 11/25/23 1502     Blood Culture No growth at 5 days            Peripheral Block    Result Date: 11/21/2023  Monroe Segura CRNA     11/21/2023  8:28 AM Peripheral Block Pre-sedation assessment completed: 11/20/2023 3:08 PM Patient reassessed immediately prior to procedure Patient location during procedure: OR Start time: 11/21/2023 3:57 PM Reason for block: at surgeon's request and post-op pain management Performed by CRNA/RUY: Monroe Segura CRNASRNA: Trenton Arana, BANDAR Preanesthetic Checklist Completed: patient identified, IV checked, site  "marked, risks and benefits discussed, surgical consent, monitors and equipment checked, pre-op evaluation and timeout performed Prep: Pt Position: supine Sterile barriers:cap, gloves, mask and washed/disinfected hands Prep: ChloraPrep Patient monitoring: blood pressure monitoring, continuous pulse oximetry and EKG Procedure Sedation: yes Performed under: general Guidance:ultrasound guided Images:still images obtained, printed/placed on chart Laterality:Bilateral Block Type:TAP Injection Technique:single-shot Needle Type:short-bevel and echogenic Needle Gauge:20 G Resistance on Injection: none Medications Used: dexamethasone sodium phosphate injection - Injection  4 mg - 11/20/2023 3:57:00 PM bupivacaine PF (MARCAINE) 0.25 % injection - Injection  30 mL - 11/20/2023 3:57:00 PM Medications Preservative Free Saline:10ml Comment:Block Injection:  LA dose divided between Right and Left block Post Assessment Injection Assessment: negative aspiration for heme, incremental injection and no paresthesia on injection Patient Tolerance:comfortable throughout block Complications:no Additional Notes Subcostal TAPs A high-frequency linear transducer, with sterile cover, was placed sub-xiphoid to identify [place], right and left Rectus Abdominus Muscles (RK). The transducer was moved either right or left subcostally to identify the KR and the Transverse Abdominus Muscle (BRAR). The insertion site was prepped in sterile fashion and then localized with 2-5 ml of 1% Lidocaine. Using ultrasound-guidance, a 20-gauge B-[place] 4\" Ultraplex 360 non-stimulating echogenic needle was advanced in plane, from medial to lateral, until the tip of the needle was in the fascial plane between the RK and BRAR. 1-3ml of preservative free normal saline was used to hydro-dissect the fascial planes. After the fascial plane was verified, the local anesthetic (LA) was injected. The procedure was repeated on the opposite side for bilateral coverage. " "Aspiration every 5 ml to prevent intravascular injection. Injection was completed with negative aspiration of blood and negative intravascular injection. Injection pressures were normal with minimal resistance. The subcostal approach to the TAP nerve block ideally anesthetizes the intercostal nerves T6-T9. Mid-Axillary/Lateral TAPs A high-frequency linear transducer, with sterile cover, was placed in the midaxillary line between the ASIS and costal margin. The External Oblique Muscle (EOM), Internal Oblique Muscle (IOM), Transverse Abdominus Muscle (BRAR), and Peritoneum were identified. The insertion site was prepped in sterile fashion and then localized with 2-5 ml of 1% Lidocaine. Using ultrasound-guidance, a 20-gauge B-[place] 4\" Ultraplex 360 non-stimulating echogenic needle was advanced in plane, from medial to lateral, until the tip of the needle was in the fascial plane between the IOM and BRAR. 1-3ml of preservative free normal saline was used to hydro-dissect the fascial planes. After the fascial plane was verified, the local anesthetic (LA) was injected. The procedure was repeated on the opposite side for bilateral coverage. Aspiration every 5 ml to prevent intravascular injection. Injection was completed with negative aspiration of blood and negative intravascular injection. Injection pressures were normal with minimal resistance. Midaxillary TAPs should reach intercostal nerves T10- T11 and the subcostal nerve T12.      CT Abdomen Pelvis With Contrast    Result Date: 11/20/2023  CT ABDOMEN PELVIS W CONTRAST Date of Exam: 11/20/2023 1:30 PM EST Indication: LLQ pain. Comparison: CT abdomen pelvis 11/25/2022 Technique: Axial CT images were obtained of the abdomen and pelvis following the uneventful intravenous administration of 85 mL Isovue-300. Reconstructed coronal and sagittal images were also obtained. Automated exposure control and iterative construction methods were used. Findings: Lower thorax: Lung " bases are clear. Liver: No focal hepatic lesion. Suspected hepatic steatosis. Gallbladder and bile ducts: Gallbladder is unremarkable. No biliary ductal dilatation. Pancreas: No pancreatic duct dilation. No surrounding inflammation. Spleen: Normal in size. Calcified granulomata. Adrenal glands: No discrete adrenal nodule. Kidneys: No hydronephrosis. Right renal cysts. Normal, symmetric excretion of contrast on delayed phase. Urinary bladder: Unremarkable. Reproductive organs: Prostatomegaly. Stomach and bowel: Extensive fat stranding and free gas surrounding sigmoid diverticular disease with regional wall thickening. Secondary enteritis involving adjacent small bowel loops. No evidence of bowel obstruction. No evidence of appendicitis. Small  hiatal hernia. Lymph nodes: No pathologically enlarged lymph nodes. Vessels: No abdominal aortic aneurysm. Atherosclerosis. Peritoneum and retroperitoneum: Pneumoperitoneum, some of which is distant from the perforated sigmoid diverticulitis, including antidependent gas in the anterior peritoneum. No drainable fluid collection at this time. Soft tissues: Tiny fat-containing umbilical hernia. Osseous structures: No suspicious osseous lesions.     Impression: Acute sigmoid diverticulitis with renato perforation, with some pneumoperitoneum noted to be distant from the sigmoid/source. There is extensive surrounding inflammatory change without drainable fluid collection at this time. Recommend surgery consult. Electronically Signed: Asher Garcia MD  11/20/2023 2:06 PM EST  Workstation ID: [id number]                 Plan for Follow-up of Pending Labs/Results:     Discharge Details        Discharge Medications        New Medications        Instructions Start Date   docusate sodium 100 MG capsule  Commonly known as: Colace   100 mg, Oral, Nightly      ertapenem 1,000 mg in sodium chloride 0.9 % 100 mL IVPB   1,000 mg, Intravenous, Every 24 Hours      fluconazole 200 MG tablet  Commonly  known as: Diflucan   400 mg, Oral, Daily      naloxone 4 MG/0.1ML nasal spray  Commonly known as: NARCAN   Call 911. Don't prime. [place] in 1 nostril for overdose. Repeat in 2-3 minutes in other nostril if no or minimal breathing/responsiveness.      ondansetron 4 MG tablet  Commonly known as: ZOFRAN   4 mg, Oral, Every 6 Hours PRN      oxyCODONE-acetaminophen 5-325 MG per tablet  Commonly known as: PERCOCET   2 tablets, Oral, Every 4 Hours PRN      polyethylene glycol 17 g packet  Commonly known as: MIRALAX   17 g, Oral, Daily             Continue These Medications        Instructions Start Date   aspirin 81 MG EC tablet   81 mg, Oral, Daily, OTC      atenolol 50 MG tablet  Commonly known as: TENORMIN   50 mg, Oral, Daily      dicyclomine 10 MG capsule  Commonly known as: BENTYL   20 mg, Oral, 3 Times Daily PRN      famotidine 40 MG tablet  Commonly known as: PEPCID   40 mg, Oral, Daily      fluticasone 50 MCG/ACT nasal spray  Commonly known as: FLONASE   2 sprays, Each Nare, Daily PRN, OTC      omeprazole 40 MG capsule  Commonly known as: priLOSEC   40 mg, Oral, 2 Times Weekly      sildenafil 20 MG tablet  Commonly known as: REVATIO   3 tablets, Oral, Daily PRN      triamterene-hydrochlorothiazide 37.5-25 MG per capsule  Commonly known as: DYAZIDE   1 capsule, Oral, Daily             Stop These Medications      ciprofloxacin 500 MG tablet  Commonly known as: Cipro     metroNIDAZOLE 500 MG tablet  Commonly known as: FLAGYL              No Known Allergies      Discharge Disposition:  Home or Self Care    Diet:  Hospital:  Diet Order   Procedures    Diet: Regular/House Diet, Gastrointestinal Diets; High Fiber, Low Irritant; Fluid Consistency: Thin (IDDSI 0)       Diet Instructions       Diet: Gastrointestinal Diets; High Fiber, Low Irritant; Regular Texture (IDDSI 7); Thin (IDDSI 0)      Discharge Diet: Gastrointestinal Diets    Gastrointestinal Diet:  High Fiber  Low Irritant       Texture: Regular Texture (IDDSI 7)     Fluid Consistency: Thin (IDDSI 0)             Activity:  Activity Instructions       Work Restrictions      Type of Restriction: Work    May Return to Work: Specific Date    Return To Work Date: 12/26/2023    With / Without Restrictions: Without Restrictions            Restrictions or Other Recommendations:  Return to work in 4 weeks ()  Restrictions of no excessive twisting/bending/lifting greater than 15 lbs for 6 weeks.  You may shower after 24 hours DEBO drain and incisional wick removal.   No tub baths, do not submerge the incision underwater in a tub bath etc.    Paint abdominal wound with Betadine daily.  Dress daily to keep clean and dry.         CODE STATUS:    Code Status and Medical Interventions:   Ordered at: 11/20/23 2017     Level Of Support Discussed With:    Patient     Code Status (Patient has no pulse and is not breathing):    CPR (Attempt to Resuscitate)     Medical Interventions (Patient has pulse or is breathing):    Full Support       Future Appointments   Date Time Provider Department Center   11/28/2023  1:15 PM Arianna Tony DO [facility]  [facility]       Additional Instructions for the Follow-ups that You Need to Schedule       Ambulatory Referral to Home Health   As directed      Face to Face Visit Date: 11/27/2023   Follow-up provider for Plan of Care?: I treated the patient in an acute care facility and will not continue treatment after discharge.   Follow-up provider: ARIANNA TONY [[id number]]   Reason/Clinical Findings: status post hospital stay   Describe mobility limitations that make leaving home difficult: impaired physical mobility   Nursing/Therapeutic Services Requested: Skilled Nursing   Skilled nursing orders: Medication education Ostomy instruction Cardiopulmonary assessments   Frequency: 1 Week 1        Discharge Follow-up with PCP   As directed       Currently Documented PCP:    Arianna Tony DO    PCP Phone Number:    [phone]     Follow Up Details: in one week  with PCP        Discharge Follow-up with Specified Provider: Dr. Vallecillo   As directed      To: Dr. Vallecillo   Follow Up Details: as per his instructions        Discharge Follow-up with Specified Provider: Dr. Eric Sidhu; 1 Week   As directed      To: Dr. Eric Sidhu   Follow Up: 1 Week                      Nazia Avalos MD  11/27/23      Time Spent on Discharge:  I spent  35 minutes on this discharge activity which included: face-to-face encounter with the patient, reviewing the data in the system, coordination of the care with the nursing staff as well as consultants, documentation, and entering orders.

## 2023-11-28 ENCOUNTER — TRANSITIONAL CARE MANAGEMENT TELEPHONE ENCOUNTER (OUTPATIENT)
Dept: CALL CENTER | Facility: HOSPITAL | Age: 58
End: 2023-11-28
Payer: COMMERCIAL

## 2023-11-28 ENCOUNTER — OFFICE VISIT (OUTPATIENT)
Dept: FAMILY MEDICINE CLINIC | Facility: CLINIC | Age: 58
End: 2023-11-28
Payer: COMMERCIAL

## 2023-11-28 ENCOUNTER — HOME CARE VISIT (OUTPATIENT)
Dept: HOME HEALTH SERVICES | Facility: HOME HEALTHCARE | Age: 58
End: 2023-11-28
Payer: COMMERCIAL

## 2023-11-28 VITALS
SYSTOLIC BLOOD PRESSURE: 122 MMHG | DIASTOLIC BLOOD PRESSURE: 68 MMHG | HEART RATE: 73 BPM | BODY MASS INDEX: 26.46 KG/M2 | HEIGHT: 71 IN | WEIGHT: 189 LBS | TEMPERATURE: 98 F

## 2023-11-28 VITALS
SYSTOLIC BLOOD PRESSURE: 127 MMHG | TEMPERATURE: 98.3 F | HEART RATE: 67 BPM | RESPIRATION RATE: 16 BRPM | DIASTOLIC BLOOD PRESSURE: 87 MMHG | OXYGEN SATURATION: 97 %

## 2023-11-28 DIAGNOSIS — I10 ESSENTIAL HYPERTENSION: ICD-10-CM

## 2023-11-28 DIAGNOSIS — K57.80 PERFORATED DIVERTICULUM: ICD-10-CM

## 2023-11-28 DIAGNOSIS — Z09 HOSPITAL DISCHARGE FOLLOW-UP: Primary | ICD-10-CM

## 2023-11-28 PROCEDURE — G0299 HHS/HOSPICE OF RN EA 15 MIN: HCPCS

## 2023-11-28 RX ORDER — VALACYCLOVIR HYDROCHLORIDE 1 G/1
TABLET, FILM COATED ORAL
COMMUNITY
Start: 2023-06-26

## 2023-11-28 NOTE — HOME HEALTH
"SOC Note:    Home Health ordered for: disciplines SN    Reason for Hosp/Primary Dx/Co-morbidities: PMHx HTN and colon polyps who presented with increased abdominal pain. In ED, CT imaging with acute sigmoid diverticulitis with renato perforation with pneumoperitoneum. General Surgery, Dr Vallecillo, evaluated in ED, and took the patient urgently to the OR on 11/20/23.     Focus of Care: Colostomy education/medication education/after surgical care of midline incision/Education w/ IV abx administration    Patient's goal(s): \"To go on my cruise in February\"    Current Functional status/mobility/DME: Pt able to ambulate w/ no assistance.     HB status/Living Arrangements: pt lives at home w/ wife in two story home. Daughter is RN who checks in frequently.     Skin Integrity/wound status: Benji = 18 Has midline incision w/ 17 staples. New colostmy. Old DEBO drain site on right up abdomen.    Code Status: CPR    Fall Risk/Safety concerns: [facility] 10 = 3    Medication issues/Concerns:IV ABX- Invanz 1gm IV Q24H/Fluconazole 400mg IV Q24H. PICC in place for IV Invanz upon d/c, plan for PO Fluconazole upon d/c. IV ABX through 12/4 and follow up with Dr. VASILIY Sidhu on 12/4.     Additional Problems/Concerns: [facility] to manage PICC line care/abx/labs    SDOH Barriers (i.e. caregiver concerns, social isolation, transportation, food insecurity, environment, income etc.)/Need for MSW: n/a    Plan for next visit: Colostomy education/medication education/after surgical care of midline incision/Education w/ IV abx administration"

## 2023-11-28 NOTE — OUTREACH NOTE
Call Center TCM Note      Flowsheet Row Responses   [facility] patient discharged from? [place]   Does the patient have one of the following disease processes/diagnoses(primary or secondary)? General Surgery   TCM attempt successful? Yes   Call start time 1425   Call end time 1425   Discharge diagnosis perforated sigmoid diverticulitis, EXPLORATORY LAPAROTOMY, SIGMOID COLECTOMY WITH COLOSTOMY CREAION, INTRA-ABDOMINAL ABSCESS DRAINAGE, APPENDECTOMY   TCM call completed? Yes   Wrap up additional comments Has a documented hospital f/u appt with PCP.   Call end time 1425   Would this patient benefit from a Referral to [facility] Social Work? No   Is the patient interested in additional calls from an ambulatory ? No            Carmelita Bello RN    11/28/2023, 14:25 EST

## 2023-11-28 NOTE — CASE COMMUNICATION
"SOC Note:    Home Health ordered for: disciplines SN    Reason for Hosp/Primary Dx/Co-morbidities: PMHx HTN and colon polyps who presented with increased abdominal pain. In ED, CT imaging with acute sigmoid diverticulitis with renato perforation with pneumoperitoneum. General Surgery, Dr Vallecillo, evaluated in ED, and took the patient urgently to the OR on 11/20/23.     Focus of Care: Colostomy education/medication education/after surgica l care of midline incision/Education w/ IV abx administration    Patient's goal(s): \"To go on my cruise in February\"    Current Functional status/mobility/DME: Pt able to ambulate w/ no assistance.     HB status/Living Arrangements: pt lives at home w/ wife in two story home. Daughter is RN who checks in frequently.     Skin Integrity/wound status: Benji = 18 Has midline incision w/ 17 staples. New colostmy. Old DEBO drain site on right  up abdomen.    Code Status: CPR    Fall Risk/Safety concerns: [facility] 10 = 3    Medication issues/Concerns:IV ABX- Invanz 1gm IV Q24H/Fluconazole 400mg IV Q24H. PICC in place for IV Invanz upon d/c, plan for PO Fluconazole upon d/c. IV ABX through 12/4 and follow up with Dr. VASILIY Sidhu on 12/4.     Additional Problems/Concerns: [facility] to manage PICC line care/abx/labs    SDOH Barriers (i.e. caregiver concerns, social isolation, transportati on, food insecurity, environment, income etc.)/Need for MSW: n/a    Plan for next visit: Colostomy education/medication education/after surgical care of midline incision/Education w/ IV abx administration"

## 2023-11-28 NOTE — PROGRESS NOTES
Transitional Care Follow Up Visit  Subjective     Lobo Yu is a 58 y.o. male who presents for a transitional care management visit.    Within 48 business hours after discharge our office contacted him via telephone to coordinate his care and needs.      I reviewed and discussed the details of that call along with the discharge summary, hospital problems, inpatient lab results, inpatient diagnostic studies, and consultation reports with Lobo.     Current outpatient and discharge medications have been reconciled for the patient.  Reviewed by: Alissa Tony DO          11/27/2023     4:42 PM   Date of TCM Phone Call   [facility]   Date of Admission 11/20/2023   Date of Discharge 11/27/2023   Discharge Disposition Home-Health Care [facility]     Risk for Readmission (LACE) Score: 10 (11/27/2023  6:00 AM)      History of Present Illness   Course During Hospital Stay:      Mr. Goetz is a very pleasant 58-year-old male who with past medical history of hypertension and pulmonary hypertension who presents today to follow-up after hospital stay.  Has a history of colon polyps as well presented with increased abdominal pain after being treated for diverticulitis multiple times including while on vacation in [place].  He ultimately reported to the emergency room was found to have sigmoid diverticulitis With renato perforation and pneumoperitoneum.  Underwent a exploratory laparotomy with sigmoid colectomy with end colostomy and abscess drainage with Dr. Vallecillo.  He was evaluated by infectious disease while inpatient as well and discharged home with a PICC line with IV events for 14 days.  He has an appointment with Dr. Larios on 12/4.  His pain is controlled with oral pain meds for which he was discharged on by hospitalist services.  His white blood cell count trended down nicely.  He is being followed by home health and had his wound redressed today.  Per the wife there is minimal drainage no significant  purulent drainage.     The following portions of the patient's history were reviewed and updated as appropriate: allergies, current medications, past family history, past medical history, past social history, past surgical history, and problem list.          Objective   Physical Exam  Constitutional:       Appearance: He is not ill-appearing.   Cardiovascular:      Rate and Rhythm: Normal rate.      Pulses: Normal pulses.   Pulmonary:      Effort: Pulmonary effort is normal.      Breath sounds: Normal breath sounds.   Neurological:      General: No focal deficit present.      Mental Status: He is alert. Mental status is at baseline.         Assessment & Plan   Diagnoses and all orders for this visit:    1. Hospital discharge follow-up (Primary)    2. Perforated diverticulum    3. Essential hypertension        Requiring emergent ex laparotomy with sigmoid colectomy and ostomy status.  Following with general surgery and infectious disease.  Stable from a family medicine standpoint as his pain is well-controlled.  [facility] paperwork filled out for patient today.  He works as a  and works from Tuesday through Saturday out of town.  For now we will plan a tentative return to work on 1/2/2024.  This is subject to change depending on his progression of symptoms and healing.           This document has been electronically signed by Alissa Tony DO   November 28, 2023 14:38 EST    Dictated Utilizing Dragon Dictation: Part of this note may be an electronic transcription/translation of spoken language to printed text using the Dragon Dictation System.    Alissa Tony D.O.  [facility] [place]

## 2023-12-01 ENCOUNTER — HOME CARE VISIT (OUTPATIENT)
Dept: HOME HEALTH SERVICES | Facility: HOME HEALTHCARE | Age: 58
End: 2023-12-01
Payer: COMMERCIAL

## 2023-12-01 ENCOUNTER — PATIENT ROUNDING (BHMG ONLY) (OUTPATIENT)
Dept: FAMILY MEDICINE CLINIC | Facility: CLINIC | Age: 58
End: 2023-12-01
Payer: COMMERCIAL

## 2023-12-01 VITALS
DIASTOLIC BLOOD PRESSURE: 81 MMHG | OXYGEN SATURATION: 98 % | RESPIRATION RATE: 16 BRPM | HEART RATE: 57 BPM | SYSTOLIC BLOOD PRESSURE: 115 MMHG | TEMPERATURE: 97.8 F

## 2023-12-01 PROCEDURE — G0299 HHS/HOSPICE OF RN EA 15 MIN: HCPCS

## 2023-12-01 NOTE — HOME HEALTH
Pt and wife report doing well w/ IV adminstration. RN daughter has been helping them. Sees ID Monday for PICC dressing change/labs. Wound care to midline incision completed. No drainage. Clean, dry and intact. 15 staples remain. No issues w/ colostomy. Bag appliance changed today by SN. [place] appearance of stoma. Pt reports emptying 4 times daily of loose brown stool. Supplies should arrive today. Next visit: change ostomy appliance including wafer/IV adminstration f/u/wound care of midline incision.
171

## 2023-12-04 ENCOUNTER — TRANSCRIBE ORDERS (OUTPATIENT)
Dept: LAB | Facility: HOSPITAL | Age: 58
End: 2023-12-04
Payer: COMMERCIAL

## 2023-12-04 ENCOUNTER — HOME CARE VISIT (OUTPATIENT)
Dept: HOME HEALTH SERVICES | Facility: HOME HEALTHCARE | Age: 58
End: 2023-12-04
Payer: COMMERCIAL

## 2023-12-04 ENCOUNTER — LAB (OUTPATIENT)
Dept: LAB | Facility: HOSPITAL | Age: 58
End: 2023-12-04
Payer: COMMERCIAL

## 2023-12-04 VITALS
HEART RATE: 67 BPM | SYSTOLIC BLOOD PRESSURE: 123 MMHG | RESPIRATION RATE: 16 BRPM | DIASTOLIC BLOOD PRESSURE: 81 MMHG | TEMPERATURE: 97.2 F | OXYGEN SATURATION: 97 %

## 2023-12-04 DIAGNOSIS — K57.20 PERFORATED DIVERTICULUM OF LARGE INTESTINE: Primary | ICD-10-CM

## 2023-12-04 DIAGNOSIS — K57.20 PERFORATED DIVERTICULUM OF LARGE INTESTINE: ICD-10-CM

## 2023-12-04 LAB
ALBUMIN SERPL-MCNC: 4.2 G/DL (ref 3.5–5.2)
ALBUMIN/GLOB SERPL: 1.4 G/DL
ALP SERPL-CCNC: 73 U/L (ref 39–117)
ALT SERPL W P-5'-P-CCNC: 33 U/L (ref 1–41)
ANION GAP SERPL CALCULATED.3IONS-SCNC: 11 MMOL/L (ref 5–15)
AST SERPL-CCNC: 33 U/L (ref 1–40)
BASOPHILS # BLD AUTO: 0.05 10*3/MM3 (ref 0–0.2)
BASOPHILS NFR BLD AUTO: 0.8 % (ref 0–1.5)
BILIRUB SERPL-MCNC: 0.2 MG/DL (ref 0–1.2)
BUN SERPL-MCNC: 16 MG/DL (ref 6–20)
BUN/CREAT SERPL: 21.6 (ref 7–25)
CALCIUM SPEC-SCNC: 9.6 MG/DL (ref 8.6–10.5)
CHLORIDE SERPL-SCNC: 100 MMOL/L (ref 98–107)
CO2 SERPL-SCNC: 29 MMOL/L (ref 22–29)
CREAT SERPL-MCNC: 0.74 MG/DL (ref 0.76–1.27)
CRP SERPL-MCNC: 0.88 MG/DL (ref 0–0.5)
DEPRECATED RDW RBC AUTO: 44 FL (ref 37–54)
EGFRCR SERPLBLD CKD-EPI 2021: 105.7 ML/MIN/1.73
EOSINOPHIL # BLD AUTO: 0.16 10*3/MM3 (ref 0–0.4)
EOSINOPHIL NFR BLD AUTO: 2.5 % (ref 0.3–6.2)
ERYTHROCYTE [DISTWIDTH] IN BLOOD BY AUTOMATED COUNT: 12.6 % (ref 12.3–15.4)
ERYTHROCYTE [SEDIMENTATION RATE] IN BLOOD: 35 MM/HR (ref 0–20)
GLOBULIN UR ELPH-MCNC: 3 GM/DL
GLUCOSE SERPL-MCNC: 107 MG/DL (ref 65–99)
HCT VFR BLD AUTO: 41.5 % (ref 37.5–51)
HGB BLD-MCNC: 13.8 G/DL (ref 13–17.7)
IMM GRANULOCYTES # BLD AUTO: 0.02 10*3/MM3 (ref 0–0.05)
IMM GRANULOCYTES NFR BLD AUTO: 0.3 % (ref 0–0.5)
LYMPHOCYTES # BLD AUTO: 2.22 10*3/MM3 (ref 0.7–3.1)
LYMPHOCYTES NFR BLD AUTO: 34.2 % (ref 19.6–45.3)
MCH RBC QN AUTO: 31.6 PG (ref 26.6–33)
MCHC RBC AUTO-ENTMCNC: 33.3 G/DL (ref 31.5–35.7)
MCV RBC AUTO: 95 FL (ref 79–97)
MONOCYTES # BLD AUTO: 0.91 10*3/MM3 (ref 0.1–0.9)
MONOCYTES NFR BLD AUTO: 14 % (ref 5–12)
NEUTROPHILS NFR BLD AUTO: 3.14 10*3/MM3 (ref 1.7–7)
NEUTROPHILS NFR BLD AUTO: 48.2 % (ref 42.7–76)
NRBC BLD AUTO-RTO: 0 /100 WBC (ref 0–0.2)
PLATELET # BLD AUTO: 412 10*3/MM3 (ref 140–450)
PMV BLD AUTO: 9.3 FL (ref 6–12)
POTASSIUM SERPL-SCNC: 4.4 MMOL/L (ref 3.5–5.2)
PROT SERPL-MCNC: 7.2 G/DL (ref 6–8.5)
RBC # BLD AUTO: 4.37 10*6/MM3 (ref 4.14–5.8)
SODIUM SERPL-SCNC: 140 MMOL/L (ref 136–145)
WBC NRBC COR # BLD AUTO: 6.5 10*3/MM3 (ref 3.4–10.8)

## 2023-12-04 PROCEDURE — G0299 HHS/HOSPICE OF RN EA 15 MIN: HCPCS

## 2023-12-04 PROCEDURE — 86140 C-REACTIVE PROTEIN: CPT

## 2023-12-04 PROCEDURE — 80053 COMPREHEN METABOLIC PANEL: CPT

## 2023-12-04 PROCEDURE — 85025 COMPLETE CBC W/AUTO DIFF WBC: CPT

## 2023-12-04 PROCEDURE — 36415 COLL VENOUS BLD VENIPUNCTURE: CPT

## 2023-12-04 PROCEDURE — 85652 RBC SED RATE AUTOMATED: CPT

## 2023-12-04 NOTE — HOME HEALTH
Routine Visit Note:     Entire ostomy appliance changed. Educated patient on how to change. Pt will attempt to change on his own w/ SN supervision next week. Pt tolerated well. Wound care to midline incision and old DEBO drain site completed. Pt tolerated well. Pt to see [facility] today to determine if more abx are needed. PICC line care to be done by [facility] today if needed. Next visit: Pt to change ostomy bag/wound care/f/u w/[facility] appt
normal strength/cranial nerves intact/sensation intact/deep reflexes intact/superficial reflexes intact/alert and oriented x 3

## 2023-12-08 ENCOUNTER — HOME CARE VISIT (OUTPATIENT)
Dept: HOME HEALTH SERVICES | Facility: HOME HEALTHCARE | Age: 58
End: 2023-12-08
Payer: COMMERCIAL

## 2023-12-08 ENCOUNTER — READMISSION MANAGEMENT (OUTPATIENT)
Dept: CALL CENTER | Facility: HOSPITAL | Age: 58
End: 2023-12-08
Payer: COMMERCIAL

## 2023-12-08 VITALS
TEMPERATURE: 98.6 F | DIASTOLIC BLOOD PRESSURE: 82 MMHG | OXYGEN SATURATION: 95 % | RESPIRATION RATE: 16 BRPM | HEART RATE: 72 BPM | SYSTOLIC BLOOD PRESSURE: 127 MMHG

## 2023-12-08 PROCEDURE — G0299 HHS/HOSPICE OF RN EA 15 MIN: HCPCS

## 2023-12-08 NOTE — OUTREACH NOTE
General Surgery Week 2 Survey      Flowsheet Row Responses   [facility] patient discharged from? [place]   Does the patient have one of the following disease processes/diagnoses(primary or secondary)? General Surgery   Week 2 attempt successful? Yes   Call start time 0942   Unsuccessful attempts Attempt 1   Call end time 0943   Discharge diagnosis perforated sigmoid diverticulitis, EXPLORATORY LAPAROTOMY, SIGMOID COLECTOMY WITH COLOSTOMY CREAION, INTRA-ABDOMINAL ABSCESS DRAINAGE, APPENDECTOMY   Is patient permission given to speak with other caregiver? Yes   List who call center can speak with Argenis spouse   Person spoke with today (if not patient) and relationship Argenis spouse   Meds reviewed with patient/caregiver? Yes   Is the patient having any side effects they believe may be caused by any medication additions or changes? No   Does the patient have all medications related to this admission filled (includes all antibiotics, pain medications, etc.) Yes   Is the patient taking all medications as directed (includes completed medication regime)? Yes   Does the patient have a follow up appointment scheduled with their surgeon? Yes  [FU with surgeon's office is next week per spouse]   Has the patient kept scheduled appointments due by today? Yes  [PCP and ID]   What is the Home health agency?  [facility]   Has home health visited the patient within 72 hours of discharge? Yes   Home health comments  still visits - PICC line has been removed and pt has been switched to oral antibx   Psychosocial issues? No   Did the patient receive a copy of their discharge instructions? Yes   Nursing interventions Reviewed instructions with patient   What is the patient's perception of their health status since discharge? Improving   Nursing interventions Nurse provided patient education   Is the patient /caregiver able to teach back basic post-op care? Drive as instructed by MD in discharge instructions, Take showers only when  approved by MD-sponge bathe until then, No tub bath, swimming, or hot tub until instructed by MD, Do not remove steri-strips, Lifting as instructed by MD in discharge instructions, Practice 'cough and deep breath'   Is the patient/caregiver able to teach back signs and symptoms of incisional infection? Increased redness, swelling or pain at the incisonal site, Increased drainage or bleeding, Incisional warmth, Pus or odor from incision, Fever   Is the patient/caregiver able to teach back steps to recovery at home? Set small, achievable goals for return to baseline health, Rest and rebuild strength, gradually increase activity, Make a list of questions for surgeon's appointment, Eat a well-balance diet   If the patient is a current smoker, are they able to teach back resources for cessation? Not a smoker   Is the patient/caregiver able to teach back the hierarchy of who to call/visit for symptoms/problems? PCP, Specialist, Home health nurse, Urgent Care, ED, 911 Yes   Week 2 call completed? Yes   Graduated Yes   Graduated/Revoked comments Pt improving per spouse   Call end time 0943            Sheila H - Registered Nurse

## 2023-12-11 ENCOUNTER — HOME CARE VISIT (OUTPATIENT)
Dept: HOME HEALTH SERVICES | Facility: HOME HEALTHCARE | Age: 58
End: 2023-12-11
Payer: COMMERCIAL

## 2023-12-11 VITALS
DIASTOLIC BLOOD PRESSURE: 92 MMHG | TEMPERATURE: 97.8 F | OXYGEN SATURATION: 97 % | RESPIRATION RATE: 16 BRPM | HEART RATE: 87 BPM | SYSTOLIC BLOOD PRESSURE: 138 MMHG

## 2023-12-11 PROCEDURE — G0299 HHS/HOSPICE OF RN EA 15 MIN: HCPCS

## 2023-12-11 NOTE — HOME HEALTH
2 piece ostomy appliance changed by pt w/ SN supervision. pt performed well. Wound care to midline and incision and old tere site completed. pt tolerated well. pt has appt w/ surgeon in AM for staple removal. VSS and no issues w/ ostomy since last visit. Pt will change bag independently on Thursday. Next visit: SN supervise of 2 piece ostomy appliance change. Set up supplies w/ [employer]. Wound assessment

## 2023-12-11 NOTE — HOME HEALTH
PICC line was removed in office on Monday. SN supervised pt on changing ostomy bag. pt did well. Midline incision wound care completed. pt tolerated well. pt sees surgeon next week. pt complained of skin hurting under belly fold. Site is intact and no redness. Educated that it's likely from where the staples are pulling. Next visit: wound care/assessment/supervise pt change ostomy appliance

## 2023-12-18 ENCOUNTER — HOME CARE VISIT (OUTPATIENT)
Dept: HOME HEALTH SERVICES | Facility: HOME HEALTHCARE | Age: 58
End: 2023-12-18
Payer: COMMERCIAL

## 2023-12-18 VITALS
OXYGEN SATURATION: 99 % | RESPIRATION RATE: 16 BRPM | DIASTOLIC BLOOD PRESSURE: 84 MMHG | SYSTOLIC BLOOD PRESSURE: 125 MMHG | HEART RATE: 72 BPM | TEMPERATURE: 98.6 F

## 2023-12-18 PROCEDURE — G0299 HHS/HOSPICE OF RN EA 15 MIN: HCPCS

## 2023-12-18 NOTE — HOME HEALTH
Pt changed ostomy appliance w/ SN supervision. Pt did well. midline incision staples removed and healed. pt wants to attempt ostomy change on own next week. If no issues will d/c. pt has been set up w/ [employer] for supplies.

## 2023-12-22 ENCOUNTER — OFFICE VISIT (OUTPATIENT)
Dept: FAMILY MEDICINE CLINIC | Facility: CLINIC | Age: 58
End: 2023-12-22
Payer: COMMERCIAL

## 2023-12-22 VITALS
TEMPERATURE: 99.3 F | HEIGHT: 71 IN | DIASTOLIC BLOOD PRESSURE: 72 MMHG | WEIGHT: 198.4 LBS | HEART RATE: 67 BPM | BODY MASS INDEX: 27.77 KG/M2 | SYSTOLIC BLOOD PRESSURE: 124 MMHG

## 2023-12-22 DIAGNOSIS — Z76.0 MEDICATION REFILL: ICD-10-CM

## 2023-12-22 DIAGNOSIS — K57.80 PERFORATED DIVERTICULUM: ICD-10-CM

## 2023-12-22 DIAGNOSIS — I10 ESSENTIAL HYPERTENSION: Primary | ICD-10-CM

## 2023-12-22 DIAGNOSIS — K21.9 GASTROESOPHAGEAL REFLUX DISEASE, UNSPECIFIED WHETHER ESOPHAGITIS PRESENT: ICD-10-CM

## 2023-12-22 RX ORDER — ATENOLOL 50 MG/1
50 TABLET ORAL DAILY
Qty: 90 TABLET | Refills: 1 | Status: SHIPPED | OUTPATIENT
Start: 2023-12-22

## 2023-12-22 RX ORDER — POLYETHYLENE GLYCOL 3350 17 G/17G
POWDER, FOR SOLUTION ORAL
COMMUNITY
Start: 2023-11-26

## 2023-12-22 RX ORDER — TRIAMTERENE AND HYDROCHLOROTHIAZIDE 37.5; 25 MG/1; MG/1
1 CAPSULE ORAL DAILY
Qty: 90 CAPSULE | Refills: 1 | Status: SHIPPED | OUTPATIENT
Start: 2023-12-22

## 2023-12-22 RX ORDER — OMEPRAZOLE 40 MG/1
40 CAPSULE, DELAYED RELEASE ORAL DAILY
Qty: 90 CAPSULE | Refills: 1 | Status: SHIPPED | OUTPATIENT
Start: 2023-12-22

## 2023-12-22 RX ORDER — PSEUDOEPHEDRINE HCL 30 MG
100 TABLET ORAL DAILY
Qty: 90 EACH | Refills: 1 | Status: SHIPPED | OUTPATIENT
Start: 2023-12-22

## 2023-12-22 RX ORDER — PSEUDOEPHEDRINE HCL 30 MG
TABLET ORAL
COMMUNITY
Start: 2023-11-26 | End: 2023-12-22 | Stop reason: SDUPTHER

## 2023-12-22 RX ORDER — FAMOTIDINE 40 MG/1
40 TABLET, FILM COATED ORAL DAILY
Qty: 90 TABLET | Refills: 1 | Status: SHIPPED | OUTPATIENT
Start: 2023-12-22

## 2023-12-22 RX ORDER — DICYCLOMINE HYDROCHLORIDE 10 MG/1
CAPSULE ORAL
COMMUNITY
Start: 2023-11-16 | End: 2023-12-22

## 2023-12-22 RX ORDER — VALACYCLOVIR HYDROCHLORIDE 1 G/1
1000 TABLET, FILM COATED ORAL 2 TIMES DAILY
Qty: 60 TABLET | Refills: 2 | Status: SHIPPED | OUTPATIENT
Start: 2023-12-22

## 2023-12-22 NOTE — ASSESSMENT & PLAN NOTE
Hypertension is improving with treatment.  Continue current treatment regimen.  Blood pressure will be reassessed at the next regular appointment.    Advise that their target blood pressure readings are an average of <140/90.  Advise to follow a low salt diet.

## 2023-12-22 NOTE — PROGRESS NOTES
Subjective     Chief Complaint  No chief complaint on file.    Subjective          Lobo Yu is a 58 y.o. male who presents today to [facility] for follow up.    HPI:   History of Present Illness      Mr. Yu is a very pleasant 58-year-old male who with past medical history of hypertension and pulmonary hypertension who presents today to follow-up.        Initial presentation -- Has a history of colon polyps as well presented with increased abdominal pain after being treated for diverticulitis multiple times including while on vacation in [place].  He ultimately reported to the emergency room was found to have sigmoid diverticulitis With renato perforation and pneumoperitoneum.  Underwent a exploratory laparotomy with sigmoid colectomy with end colostomy and abscess drainage with Dr. Vallecillo.  He was evaluated by infectious disease while inpatient as well and discharged home with a PICC line with IV events for 14 days.  He has an appointment with Dr. Larios on 12/4.  His pain is controlled with oral pain meds for which he was discharged on by hospitalist services.  His white blood cell count trended down nicely.  He is being followed by home health and had his wound redressed today.  Per the wife there is minimal drainage no significant purulent drainage.    Overall he reports feeling better and more himself. He has noted some irriation of the stoma with mild bleeding this morning     His blood pressure is very well-controlled at 124/72.  Current prescription includes triamterene-HCTZ 37.5 mg / 25 mg daily, atenolol 50 mg daily    The following portions of the patient's history were reviewed and updated as appropriate: allergies, current medications, past family history, past medical history, past social history, past surgical history and problem list.    Objective     Objective     Allergy:   Allergies   Allergen Reactions    Erythromycin Unknown - Low Severity         Current Medications:   Current Outpatient Medications   Medication Sig Dispense Refill    aspirin 81 MG EC tablet Take 1 tablet by mouth Daily. OTC      atenolol (TENORMIN) 50 MG tablet Take 1 tablet by mouth Daily. Indications: High Blood Pressure Disorder 90 tablet 1    diphenhydrAMINE (Benadryl Allergy) 25 MG tablet Take 1 tablet by mouth 1 (One) Time. IV admin interaction  Indications: Life-Threatening Hypersensitivity Reaction      docusate sodium 100 MG capsule Take 1 capsule by mouth Daily. 90 each 1    famotidine (PEPCID) 40 MG tablet Take 1 tablet by mouth Daily. Indications: Gastroesophageal Reflux Disease 90 tablet 1    fluticasone (FLONASE) 50 MCG/ACT nasal spray 2 sprays by Each Nare route Daily As Needed for Allergies or Rhinitis. OTC  Indications: Stuffy Nose      omeprazole (priLOSEC) 40 MG capsule Take 1 capsule by mouth Daily. Indications: Gastroesophageal Reflux Disease 90 capsule 1    polyethylene glycol 17 g packet PEG 3350 17 GM PACK      triamterene-hydrochlorothiazide (DYAZIDE) 37.5-25 MG per capsule Take 1 capsule by mouth Daily. Indications: High Blood Pressure Disorder 90 capsule 1    valACYclovir (VALTREX) 1000 MG tablet Take 1 tablet by mouth 2 (Two) Times a Day. 60 tablet 2     No current facility-administered medications for this visit.       Past Medical History:  Past Medical History:   Diagnosis Date    GERD (gastroesophageal reflux disease)     Hypertension        Past Surgical History:  Past Surgical History:   Procedure Laterality Date    EXPLORATORY LAPAROTOMY N/A 11/20/2023    Procedure: EXPLORATORY LAPAROTOMY, SIGMOID COLECTOMY WITH COLOSTOMY CREAION, INTRA-ABDOMINAL ABSCESS DRAINAGE, APPENDECTOMY;  Surgeon: Hitesh Vallecillo MD;  Location: [facility];  Service: General;  Laterality: N/A;    NEPHROLITHOTOMY      TONSILLECTOMY         Social History:  Social History     Socioeconomic History    Marital status:    Tobacco Use    Smoking status: Former      "Packs/day: 0.50     Years: 20.00     Additional pack years: 0.00     Total pack years: 10.00     Types: Cigarettes     Quit date: 1981     Years since quittin.9     Passive exposure: Past    Smokeless tobacco: Never   Substance and Sexual Activity    Alcohol use: Never    Drug use: Never    Sexual activity: Defer       Family History:  History reviewed. No pertinent family history.      Vital Signs:   /72   Pulse 67   Temp 99.3 °F (37.4 °C) (Infrared)   Ht 180.3 cm (70.98\")   Wt 90 kg (198 lb 6.4 oz)   BMI 27.68 kg/m²      Physical Exam:  Physical Exam  Constitutional:       Appearance: He is not ill-appearing.   Eyes:      Pupils: Pupils are equal, round, and reactive to light.   Cardiovascular:      Rate and Rhythm: Normal rate.      Pulses: Normal pulses.   Pulmonary:      Effort: Pulmonary effort is normal.      Breath sounds: Normal breath sounds.   Abdominal:      Comments: Colostomy present - slightly irration of the stoma     Neurological:      General: No focal deficit present.      Mental Status: He is alert. Mental status is at baseline.   Psychiatric:         Mood and Affect: Mood normal.         Thought Content: Thought content normal.               PHQ-9 Score  PHQ-9 Total Score:       Lab Review  Lab on 2023   Component Date Value Ref Range Status    Glucose 2023 107 (H)  65 - 99 mg/dL Final    BUN 2023 16  6 - 20 mg/dL Final    Creatinine 2023 0.74 (L)  0.76 - 1.27 mg/dL Final    Sodium 2023 140  136 - 145 mmol/L Final    Potassium 2023 4.4  3.5 - 5.2 mmol/L Final    Chloride 2023 100  98 - 107 mmol/L Final    CO2 2023 29.0  22.0 - 29.0 mmol/L Final    Calcium 2023 9.6  8.6 - 10.5 mg/dL Final    Total Protein 2023 7.2  6.0 - 8.5 g/dL Final    Albumin 2023 4.2  3.5 - 5.2 g/dL Final    ALT (SGPT) 2023 33  1 - 41 U/L Final    AST (SGOT) 2023 33  1 - 40 U/L Final    Alkaline Phosphatase 2023 73  39 - " 117 U/L Final    Total Bilirubin 12/04/2023 0.2  0.0 - 1.2 mg/dL Final    Globulin 12/04/2023 3.0  gm/dL Final    Calculated Result    A/G Ratio 12/04/2023 1.4  g/dL Final    BUN/Creatinine Ratio 12/04/2023 21.6  7.0 - 25.0 Final    Anion Gap 12/04/2023 11.0  5.0 - 15.0 mmol/L Final    eGFR 12/04/2023 105.7  >60.0 mL/min/1.73 Final    Sed Rate 12/04/2023 35 (H)  0 - 20 mm/hr Final    C-Reactive Protein 12/04/2023 0.88 (H)  0.00 - 0.50 mg/dL Final    WBC 12/04/2023 6.50  3.40 - 10.80 10*3/mm3 Final    RBC 12/04/2023 4.37  4.14 - 5.80 10*6/mm3 Final    Hemoglobin 12/04/2023 13.8  13.0 - 17.7 g/dL Final    Hematocrit 12/04/2023 41.5  37.5 - 51.0 % Final    MCV 12/04/2023 95.0  79.0 - 97.0 fL Final    MCH 12/04/2023 31.6  26.6 - 33.0 pg Final    MCHC 12/04/2023 33.3  31.5 - 35.7 g/dL Final    RDW 12/04/2023 12.6  12.3 - 15.4 % Final    RDW-SD 12/04/2023 44.0  37.0 - 54.0 fl Final    MPV 12/04/2023 9.3  6.0 - 12.0 fL Final    Platelets 12/04/2023 412  140 - 450 10*3/mm3 Final    Neutrophil % 12/04/2023 48.2  42.7 - 76.0 % Final    Lymphocyte % 12/04/2023 34.2  19.6 - 45.3 % Final    Monocyte % 12/04/2023 14.0 (H)  5.0 - 12.0 % Final    Eosinophil % 12/04/2023 2.5  0.3 - 6.2 % Final    Basophil % 12/04/2023 0.8  0.0 - 1.5 % Final    Immature Grans % 12/04/2023 0.3  0.0 - 0.5 % Final    Neutrophils, Absolute 12/04/2023 3.14  1.70 - 7.00 10*3/mm3 Final    Lymphocytes, Absolute 12/04/2023 2.22  0.70 - 3.10 10*3/mm3 Final    Monocytes, Absolute 12/04/2023 0.91 (H)  0.10 - 0.90 10*3/mm3 Final    Eosinophils, Absolute 12/04/2023 0.16  0.00 - 0.40 10*3/mm3 Final    Basophils, Absolute 12/04/2023 0.05  0.00 - 0.20 10*3/mm3 Final    Immature Grans, Absolute 12/04/2023 0.02  0.00 - 0.05 10*3/mm3 Final    nRBC 12/04/2023 0.0  0.0 - 0.2 /100 WBC Final   No results displayed because visit has over 200 results.      Lab Requisition on 11/06/2023   Component Date Value Ref Range Status    Reference Lab Report 11/06/2023    Final          "           Value:[facility]  [address] [place] [address]  Phone: [phone] or [phone]  Fax: [phone]  Hitesh Perez M.D., Medical Director    PATIENT NAME                           LABORATORY NO.  DELORES GOEL                     [id number]  [id number]                         AGE              SEX  SSN           CLIENT REF #  [facility]           58      1965      [id number]   [id number]    [address]              REQUESTING M.D.     ATTENDING [place] [address]                FIORELLA DE LEON CAMMUAL  DATE COLLECTED      DATE RECEIVED      DATE REPORTED  2023    DIAGNOSIS:  A.   CECUM POLYP:  Tubular adenoma  Negative for high-grade dysplasia/malignancy    B.   ASCENDING COLON POLYP:  Sessile serrated adenoma  Negative for cytologic atypia    C.   SIGMOID COLON POLYP:  Polypoid fragment of benign colonic mucosa demonstrating                           small  lymphoid aggregate  Negative for neoplasia/malignancy      COMMENT:  C.  Multiple additional deeper levels are obtained and  demonstrate similar features.    CLINICAL HISTORY:  Diverticulosis of large intestine without perforation or abscess without bleeding,  other hemorrhoids, benign neoplasm of sigmoid colon, benign neoplasm of  cecum, encounter for screening for malignant neoplasm of colon    SPECIMENS RECEIVED:  A.  CECUM POLYP  B.  ASCENDING COLON POLYP  C.  SIGMOID COLON POLYP    MICROSCOPIC DESCRIPTION:  Tissue blocks are prepared and slides are examined microscopically on all  specimens. See diagnosis for details.    Professional interpretation rendered by Sofi Duran M.D., F.C.A.P. at  [employer], [employer], [address] [place] [address].    GROSS DESCRIPTION:  A.  Labeled \"cecum polyp\".  Consists of 2 pieces of tan soft tissue measuring  0.3 x 0.2 x 0.2 cm in aggregate " "and is submitted entirely in 1 block.  DEBO  B.  Labeled \"ascending colon polyp\".  Consists of                           multiple pieces of tan soft  tissue measuring 0.8 x 0.6 x 0.2 cm in aggregate and is submitted entirely  in 1 block.  C.  Labeled \"sigmoid colon polyp\".  Consists of 1 piece of tan soft tissue  measuring 0.3 x 0.2 x 0.2 cm and is submitted entirely in 1 block.    REVIEWED, DIAGNOSED AND ELECTRONICALLY  SIGNED BY:    Sofi Duran M.D., F.C.A.P.  CPT CODES:  88305x3          Radiology Results        Assessment / Plan         Assessment and Plan   Diagnoses and all orders for this visit:    1. Essential hypertension (Primary)  Assessment & Plan:  Hypertension is improving with treatment.  Continue current treatment regimen.  Blood pressure will be reassessed at the next regular appointment.    Advise that their target blood pressure readings are an average of <140/90.  Advise to follow a low salt diet.       Orders:  -     atenolol (TENORMIN) 50 MG tablet; Take 1 tablet by mouth Daily. Indications: High Blood Pressure Disorder  Dispense: 90 tablet; Refill: 1  -     triamterene-hydrochlorothiazide (DYAZIDE) 37.5-25 MG per capsule; Take 1 capsule by mouth Daily. Indications: High Blood Pressure Disorder  Dispense: 90 capsule; Refill: 1    2. Gastroesophageal reflux disease, unspecified whether esophagitis present  -     famotidine (PEPCID) 40 MG tablet; Take 1 tablet by mouth Daily. Indications: Gastroesophageal Reflux Disease  Dispense: 90 tablet; Refill: 1  -     omeprazole (priLOSEC) 40 MG capsule; Take 1 capsule by mouth Daily. Indications: Gastroesophageal Reflux Disease  Dispense: 90 capsule; Refill: 1    3. Medication refill  -     docusate sodium 100 MG capsule; Take 1 capsule by mouth Daily.  Dispense: 90 each; Refill: 1  -     valACYclovir (VALTREX) 1000 MG tablet; Take 1 tablet by mouth 2 (Two) Times a Day.  Dispense: 60 tablet; Refill: 2    4. Perforated diverticulum  -     Powder " Ostomy Stomahesive Protective 1OZ        Discussed possible differential diagnoses, testing, treatment, recommended non-pharmacological interventions, risks, warning signs to monitor for that would indicate need for follow-up in clinic or ER. If no improvement with these regimens or you have new or worsening symptoms follow-up. Patient verbalizes understanding and agreement with plan of care. Denies further needs or concerns.     Patient was given instructions and counseling regarding her condition and for health maintenance advised.    BMI is >= 25 and <30. (Overweight) The following options were offered after discussion;: weight loss educational material (shared in after visit summary)             Health Maintenance  Health Maintenance:   Health Maintenance Due   Topic Date Due    HEPATITIS C SCREENING  Never done    ANNUAL PHYSICAL  Never done        Meds ordered during this visit  New Medications Ordered This Visit   Medications    docusate sodium 100 MG capsule     Sig: Take 1 capsule by mouth Daily.     Dispense:  90 each     Refill:  1    atenolol (TENORMIN) 50 MG tablet     Sig: Take 1 tablet by mouth Daily. Indications: High Blood Pressure Disorder     Dispense:  90 tablet     Refill:  1    famotidine (PEPCID) 40 MG tablet     Sig: Take 1 tablet by mouth Daily. Indications: Gastroesophageal Reflux Disease     Dispense:  90 tablet     Refill:  1    omeprazole (priLOSEC) 40 MG capsule     Sig: Take 1 capsule by mouth Daily. Indications: Gastroesophageal Reflux Disease     Dispense:  90 capsule     Refill:  1    triamterene-hydrochlorothiazide (DYAZIDE) 37.5-25 MG per capsule     Sig: Take 1 capsule by mouth Daily. Indications: High Blood Pressure Disorder     Dispense:  90 capsule     Refill:  1    valACYclovir (VALTREX) 1000 MG tablet     Sig: Take 1 tablet by mouth 2 (Two) Times a Day.     Dispense:  60 tablet     Refill:  2       Meds stopped during this visit:  Medications Discontinued During This  Encounter   Medication Reason    naloxone (NARCAN) 4 MG/0.1ML nasal spray Patient Reported Not Taking    ondansetron (ZOFRAN) 4 MG tablet Patient Reported Not Taking    dicyclomine (BENTYL) 10 MG capsule     EPINEPHrine, Anaphylaxis, (ADRENALIN) 1 MG/ML injection Patient Reported Not Taking    sildenafil (REVATIO) 20 MG tablet Patient Reported Not Taking    atenolol (TENORMIN) 50 MG tablet Reorder    famotidine (PEPCID) 40 MG tablet Reorder    omeprazole (priLOSEC) 40 MG capsule Reorder    triamterene-hydrochlorothiazide (DYAZIDE) 37.5-25 MG per capsule Reorder    valACYclovir (VALTREX) 1000 MG tablet Reorder    docusate sodium 100 MG capsule Reorder        Visit Diagnoses    ICD-10-CM ICD-9-CM   1. Essential hypertension  I10 401.9   2. Gastroesophageal reflux disease, unspecified whether esophagitis present  K21.9 530.81   3. Medication refill  Z76.0 V68.1   4. Perforated diverticulum  K57.80 562.10       Patient was given instructions and counseling regarding his condition or for health maintenance advice. Please see specific information pulled into the AVS if appropriate.     Follow Up   Return in about 3 months (around 3/22/2024) for Recheck.          This document has been electronically signed by Alissa Tony DO   December 22, 2023 14:03 EST    Dictated Utilizing Dragon Dictation: Part of this note may be an electronic transcription/translation of spoken language to printed text using the Dragon Dictation System.    Alissa Tony D.O.  [facility]

## 2023-12-22 NOTE — LETTER
December 22, 2023     Patient: Lobo Yu   YOB: 1965   Date of Visit: 12/22/2023       To Whom It May Concern:    It is my medical opinion that Lobo Yu may return to work on 1/8/2024 .           Sincerely,            Alissa Tony DO    CC: No Recipients

## 2023-12-27 ENCOUNTER — HOME CARE VISIT (OUTPATIENT)
Dept: HOME HEALTH SERVICES | Facility: HOME HEALTHCARE | Age: 58
End: 2023-12-27
Payer: COMMERCIAL

## 2023-12-27 VITALS
DIASTOLIC BLOOD PRESSURE: 79 MMHG | HEART RATE: 51 BPM | RESPIRATION RATE: 16 BRPM | TEMPERATURE: 97.2 F | OXYGEN SATURATION: 99 % | SYSTOLIC BLOOD PRESSURE: 126 MMHG

## 2023-12-27 PROCEDURE — G0495 RN CARE TRAIN/EDU IN HH: HCPCS

## 2023-12-30 ENCOUNTER — HOME CARE VISIT (OUTPATIENT)
Dept: HOME HEALTH SERVICES | Facility: HOME HEALTHCARE | Age: 58
End: 2023-12-30
Payer: COMMERCIAL

## 2023-12-30 NOTE — HOME HEALTH
Discharge Summary/Summary of Care Provided:   Patient received home health for diagnosis: Colostomy education/medication education/after surgical care of midline incision/Education w/ IV abx administration  Current level of functional ability: Pt independent for all ADLs  Living arrangements: pt lives in two story home w/ wife  Progress towards goals and/or Were all goals met? yes  If not all goals met, barriers that prevented patient from meeting goals: n/a  SDOH concerns (i.e. Caregiver availability, social isolation, environment, income, transportation access, food insecurity etc.)n/a  Follow-up appointment plans and community resources provided: f/u w/ MD appts

## 2023-12-30 NOTE — CASE COMMUNICATION
Discharge Summary/Summary of Care Provided:   Patient received home health for diagnosis: Colostomy education/medication education/after surgical care of midline incision/Education w/ IV abx administration  Current level of functional ability: Pt independent for all ADLs  Living arrangements: pt lives in two story home w/ wife  Progress towards goals and/or Were all goals met? yes  If not all goals met, barriers that prevented patient f rom meeting goals: n/a  SDOH concerns (i.e. Caregiver availability, social isolation, environment, income, transportation access, food insecurity etc.)n/a  Follow-up appointment plans and community resources provided: f/u w/ MD appts

## 2024-01-22 ENCOUNTER — TELEPHONE (OUTPATIENT)
Dept: FAMILY MEDICINE CLINIC | Facility: CLINIC | Age: 59
End: 2024-01-22

## 2024-01-22 NOTE — TELEPHONE ENCOUNTER
Caller: Lobo Yu    Relationship to patient: Self    Best call back number: [phone]    Patient is needing: PATIENT WANTED TO MAKE DR HERRERA AWARE THAT YOU WILL BE RECEIVING A FAX FROM [employer]. PATIENT HAD TO CHANGE WHERE HE RECEIVES HIS COLOSTOMY BAGS AND SUPPLIES FROM.

## 2024-02-13 DIAGNOSIS — K57.92 DIVERTICULITIS: ICD-10-CM

## 2024-02-13 RX ORDER — DICYCLOMINE HYDROCHLORIDE 10 MG/1
20 CAPSULE ORAL 3 TIMES DAILY PRN
Qty: 540 CAPSULE | Refills: 1 | Status: SHIPPED | OUTPATIENT
Start: 2024-02-13

## 2024-03-05 ENCOUNTER — TELEPHONE (OUTPATIENT)
Dept: FAMILY MEDICINE CLINIC | Facility: CLINIC | Age: 59
End: 2024-03-05

## 2024-03-05 NOTE — TELEPHONE ENCOUNTER
Caller: Lobo Yu    Relationship: Self    Best call back number: [phone]    What was the call regarding: PT HAS PULMONARY HTN IN HIS DX AND HE WAS TRYING TO SET LIFE INSURANCE. HE WAS UNAWARE HE HAD THIS. CAN YOU LOOK AND SEE WHY THIS IS ON HIS DX AND CALL HIM TO LET HIM KNOW    Is it okay if the provider responds through MyChart: NO

## 2024-03-19 DIAGNOSIS — Z76.0 MEDICATION REFILL: ICD-10-CM

## 2024-03-19 RX ORDER — VALACYCLOVIR HYDROCHLORIDE 1 G/1
1000 TABLET, FILM COATED ORAL 2 TIMES DAILY
Qty: 180 TABLET | Refills: 3 | Status: SHIPPED | OUTPATIENT
Start: 2024-03-19

## 2024-03-25 ENCOUNTER — LAB (OUTPATIENT)
Dept: LAB | Facility: HOSPITAL | Age: 59
End: 2024-03-25
Payer: COMMERCIAL

## 2024-03-25 ENCOUNTER — OFFICE VISIT (OUTPATIENT)
Dept: FAMILY MEDICINE CLINIC | Facility: CLINIC | Age: 59
End: 2024-03-25
Payer: COMMERCIAL

## 2024-03-25 VITALS
TEMPERATURE: 99.5 F | BODY MASS INDEX: 29.15 KG/M2 | HEART RATE: 78 BPM | SYSTOLIC BLOOD PRESSURE: 128 MMHG | HEIGHT: 71 IN | WEIGHT: 208.2 LBS | DIASTOLIC BLOOD PRESSURE: 90 MMHG

## 2024-03-25 DIAGNOSIS — K57.80 PERFORATED DIVERTICULUM: ICD-10-CM

## 2024-03-25 DIAGNOSIS — Z12.5 SCREENING FOR MALIGNANT NEOPLASM OF PROSTATE: ICD-10-CM

## 2024-03-25 DIAGNOSIS — Z00.00 ENCOUNTER FOR ANNUAL PHYSICAL EXAM: ICD-10-CM

## 2024-03-25 DIAGNOSIS — Z11.59 NEED FOR HEPATITIS C SCREENING TEST: ICD-10-CM

## 2024-03-25 DIAGNOSIS — I10 ESSENTIAL HYPERTENSION: Primary | ICD-10-CM

## 2024-03-25 PROBLEM — I27.20 PULMONARY HYPERTENSION: Status: RESOLVED | Noted: 2023-11-20 | Resolved: 2024-03-25

## 2024-03-25 LAB
ALBUMIN SERPL-MCNC: 4.5 G/DL (ref 3.5–5.2)
ALBUMIN/GLOB SERPL: 1.7 G/DL
ALP SERPL-CCNC: 67 U/L (ref 39–117)
ALT SERPL W P-5'-P-CCNC: 36 U/L (ref 1–41)
ANION GAP SERPL CALCULATED.3IONS-SCNC: 11 MMOL/L (ref 5–15)
AST SERPL-CCNC: 25 U/L (ref 1–40)
BASOPHILS # BLD AUTO: 0.09 10*3/MM3 (ref 0–0.2)
BASOPHILS NFR BLD AUTO: 0.9 % (ref 0–1.5)
BILIRUB SERPL-MCNC: 0.5 MG/DL (ref 0–1.2)
BUN SERPL-MCNC: 15 MG/DL (ref 6–20)
BUN/CREAT SERPL: 13.4 (ref 7–25)
CALCIUM SPEC-SCNC: 9.3 MG/DL (ref 8.6–10.5)
CHLORIDE SERPL-SCNC: 102 MMOL/L (ref 98–107)
CHOLEST SERPL-MCNC: 185 MG/DL (ref 0–200)
CO2 SERPL-SCNC: 27 MMOL/L (ref 22–29)
CREAT SERPL-MCNC: 1.12 MG/DL (ref 0.76–1.27)
DEPRECATED RDW RBC AUTO: 42.4 FL (ref 37–54)
EGFRCR SERPLBLD CKD-EPI 2021: 76.6 ML/MIN/1.73
EOSINOPHIL # BLD AUTO: 0.12 10*3/MM3 (ref 0–0.4)
EOSINOPHIL NFR BLD AUTO: 1.3 % (ref 0.3–6.2)
ERYTHROCYTE [DISTWIDTH] IN BLOOD BY AUTOMATED COUNT: 13 % (ref 12.3–15.4)
GLOBULIN UR ELPH-MCNC: 2.6 GM/DL
GLUCOSE SERPL-MCNC: 96 MG/DL (ref 65–99)
HBA1C MFR BLD: 5.5 % (ref 4.8–5.6)
HCT VFR BLD AUTO: 44.3 % (ref 37.5–51)
HDLC SERPL-MCNC: 43 MG/DL (ref 40–60)
HGB BLD-MCNC: 14.9 G/DL (ref 13–17.7)
IMM GRANULOCYTES # BLD AUTO: 0.04 10*3/MM3 (ref 0–0.05)
IMM GRANULOCYTES NFR BLD AUTO: 0.4 % (ref 0–0.5)
LDLC SERPL CALC-MCNC: 124 MG/DL (ref 0–100)
LDLC/HDLC SERPL: 2.84 {RATIO}
LYMPHOCYTES # BLD AUTO: 2.42 10*3/MM3 (ref 0.7–3.1)
LYMPHOCYTES NFR BLD AUTO: 25.5 % (ref 19.6–45.3)
MCH RBC QN AUTO: 30.3 PG (ref 26.6–33)
MCHC RBC AUTO-ENTMCNC: 33.6 G/DL (ref 31.5–35.7)
MCV RBC AUTO: 90.2 FL (ref 79–97)
MONOCYTES # BLD AUTO: 0.95 10*3/MM3 (ref 0.1–0.9)
MONOCYTES NFR BLD AUTO: 10 % (ref 5–12)
NEUTROPHILS NFR BLD AUTO: 5.88 10*3/MM3 (ref 1.7–7)
NEUTROPHILS NFR BLD AUTO: 61.9 % (ref 42.7–76)
NRBC BLD AUTO-RTO: 0 /100 WBC (ref 0–0.2)
PLATELET # BLD AUTO: 293 10*3/MM3 (ref 140–450)
PMV BLD AUTO: 10 FL (ref 6–12)
POTASSIUM SERPL-SCNC: 4 MMOL/L (ref 3.5–5.2)
PROT SERPL-MCNC: 7.1 G/DL (ref 6–8.5)
PSA SERPL-MCNC: 1.46 NG/ML (ref 0–4)
RBC # BLD AUTO: 4.91 10*6/MM3 (ref 4.14–5.8)
SODIUM SERPL-SCNC: 140 MMOL/L (ref 136–145)
TRIGL SERPL-MCNC: 99 MG/DL (ref 0–150)
TSH SERPL DL<=0.05 MIU/L-ACNC: 3.22 UIU/ML (ref 0.27–4.2)
VLDLC SERPL-MCNC: 18 MG/DL (ref 5–40)
WBC NRBC COR # BLD AUTO: 9.5 10*3/MM3 (ref 3.4–10.8)

## 2024-03-25 PROCEDURE — 82607 VITAMIN B-12: CPT

## 2024-03-25 PROCEDURE — 36415 COLL VENOUS BLD VENIPUNCTURE: CPT

## 2024-03-25 PROCEDURE — G0103 PSA SCREENING: HCPCS

## 2024-03-25 PROCEDURE — 80050 GENERAL HEALTH PANEL: CPT

## 2024-03-25 PROCEDURE — 86803 HEPATITIS C AB TEST: CPT

## 2024-03-25 PROCEDURE — 82306 VITAMIN D 25 HYDROXY: CPT

## 2024-03-25 PROCEDURE — 83036 HEMOGLOBIN GLYCOSYLATED A1C: CPT

## 2024-03-25 PROCEDURE — 80061 LIPID PANEL: CPT

## 2024-03-25 NOTE — ASSESSMENT & PLAN NOTE
Hypertension is improving with treatment.  Continue current treatment regimen.  Blood pressure will be reassessed at the next regular appointment.    Advise that their target blood pressure readings are an average of <140/90.  Advise to follow a low salt diet.       Ordering a echo to ensure no evidence of pulmonary hypertension.

## 2024-03-25 NOTE — PROGRESS NOTES
Subjective     Chief Complaint  No chief complaint on file.    Subjective          Lobo Yu is a 58 y.o. male who presents today to [facility] for follow up.    HPI:   History of Present Illness      Mr. Yu is a very pleasant 58-year-old male who with past medical history of hypertension and pulmonary hypertension who presents today to follow-up.      Initial presentation -- Has a history of colon polyps as well presented with increased abdominal pain after being treated for diverticulitis multiple times including while on vacation in [place].  He ultimately reported to the emergency room was found to have sigmoid diverticulitis With renato perforation and pneumoperitoneum.  Underwent a exploratory laparotomy with sigmoid colectomy with end colostomy and abscess drainage with Dr. Vallecillo.  He was evaluated by infectious disease while inpatient as well and discharged home with a PICC line with IV events for 14 days.  He has an appointment with Dr. Larios on 12/4.    His blood pressure is very well-controlled at 128/90.  Current prescription includes triamterene-HCTZ 37.5 mg / 25 mg daily, atenolol 50 mg daily      Of note, he was getting scheduled for a life insurance policy.  However, in his medical record it was noted that he was diagnosed with pulmonary hypertension.  He reports that he does not ever recall being diagnosed with this.  He has never had an echo.      The following portions of the patient's history were reviewed and updated as appropriate: allergies, current medications, past family history, past medical history, past social history, past surgical history and problem list.    Objective     Objective     Allergy:   Allergies   Allergen Reactions    Erythromycin Unknown - Low Severity        Current Medications:   Current Outpatient Medications   Medication Sig Dispense Refill    aspirin 81 MG EC tablet Take 1 tablet by mouth Daily. OTC      atenolol  (TENORMIN) 50 MG tablet Take 1 tablet by mouth Daily. Indications: High Blood Pressure Disorder 90 tablet 1    dicyclomine (BENTYL) 10 MG capsule TAKE 2 CAPSULES BY MOUTH 3 (THREE) TIMES A DAY AS NEEDED FOR ABDOMINAL CRAMPING. 540 capsule 1    docusate sodium 100 MG capsule Take 1 capsule by mouth Daily. 90 each 1    famotidine (PEPCID) 40 MG tablet Take 1 tablet by mouth Daily. Indications: Gastroesophageal Reflux Disease 90 tablet 1    fluticasone (FLONASE) 50 MCG/ACT nasal spray 2 sprays by Each Nare route Daily As Needed for Allergies or Rhinitis. OTC  Indications: Stuffy Nose      omeprazole (priLOSEC) 40 MG capsule Take 1 capsule by mouth Daily. Indications: Gastroesophageal Reflux Disease (Patient taking differently: Take 1 capsule by mouth Daily.) 90 capsule 1    triamterene-hydrochlorothiazide (DYAZIDE) 37.5-25 MG per capsule Take 1 capsule by mouth Daily. Indications: High Blood Pressure Disorder 90 capsule 1    valACYclovir (VALTREX) 1000 MG tablet TAKE 1 TABLET BY MOUTH TWICE A  tablet 3     No current facility-administered medications for this visit.       Past Medical History:  Past Medical History:   Diagnosis Date    GERD (gastroesophageal reflux disease)     Hypertension        Past Surgical History:  Past Surgical History:   Procedure Laterality Date    EXPLORATORY LAPAROTOMY N/A 11/20/2023    Procedure: EXPLORATORY LAPAROTOMY, SIGMOID COLECTOMY WITH COLOSTOMY CREAION, INTRA-ABDOMINAL ABSCESS DRAINAGE, APPENDECTOMY;  Surgeon: Hitesh Vallecillo MD;  Location: [facility];  Service: General;  Laterality: N/A;    NEPHROLITHOTOMY      TONSILLECTOMY         Social History:  Social History     Socioeconomic History    Marital status:    Tobacco Use    Smoking status: Former     Current packs/day: 0.50     Average packs/day: 0.5 packs/day for 30.2 years (15.1 ttl pk-yrs)     Types: Cigarettes     Start date: 1/1/1994     Quit date: 1/11/1981     Passive exposure: Past    Smokeless  "tobacco: Never   Substance and Sexual Activity    Alcohol use: Never    Drug use: Never    Sexual activity: Defer       Family History:  No family history on file.      Vital Signs:   /90   Pulse 78   Temp 99.5 °F (37.5 °C) (Infrared)   Ht 180.3 cm (70.98\")   Wt 94.4 kg (208 lb 3.2 oz)   BMI 29.05 kg/m²      Physical Exam:  Physical Exam  Constitutional:       Appearance: He is not ill-appearing.   Eyes:      Pupils: Pupils are equal, round, and reactive to light.   Cardiovascular:      Rate and Rhythm: Normal rate.      Pulses: Normal pulses.   Pulmonary:      Effort: Pulmonary effort is normal.      Breath sounds: Normal breath sounds.   Abdominal:      Comments: Colostomy present - slightly irration of the stoma     Neurological:      General: No focal deficit present.      Mental Status: He is alert. Mental status is at baseline.   Psychiatric:         Mood and Affect: Mood normal.         Thought Content: Thought content normal.               PHQ-9 Score  PHQ-9 Total Score:       Lab Review  No visits with results within 3 Month(s) from this visit.   Latest known visit with results is:   Lab on 12/04/2023   Component Date Value Ref Range Status    Glucose 12/04/2023 107 (H)  65 - 99 mg/dL Final    BUN 12/04/2023 16  6 - 20 mg/dL Final    Creatinine 12/04/2023 0.74 (L)  0.76 - 1.27 mg/dL Final    Sodium 12/04/2023 140  136 - 145 mmol/L Final    Potassium 12/04/2023 4.4  3.5 - 5.2 mmol/L Final    Chloride 12/04/2023 100  98 - 107 mmol/L Final    CO2 12/04/2023 29.0  22.0 - 29.0 mmol/L Final    Calcium 12/04/2023 9.6  8.6 - 10.5 mg/dL Final    Total Protein 12/04/2023 7.2  6.0 - 8.5 g/dL Final    Albumin 12/04/2023 4.2  3.5 - 5.2 g/dL Final    ALT (SGPT) 12/04/2023 33  1 - 41 U/L Final    AST (SGOT) 12/04/2023 33  1 - 40 U/L Final    Alkaline Phosphatase 12/04/2023 73  39 - 117 U/L Final    Total Bilirubin 12/04/2023 0.2  0.0 - 1.2 mg/dL Final    Globulin 12/04/2023 3.0  gm/dL Final    Calculated Result "    A/G Ratio 12/04/2023 1.4  g/dL Final    BUN/Creatinine Ratio 12/04/2023 21.6  7.0 - 25.0 Final    Anion Gap 12/04/2023 11.0  5.0 - 15.0 mmol/L Final    eGFR 12/04/2023 105.7  >60.0 mL/min/1.73 Final    Sed Rate 12/04/2023 35 (H)  0 - 20 mm/hr Final    C-Reactive Protein 12/04/2023 0.88 (H)  0.00 - 0.50 mg/dL Final    WBC 12/04/2023 6.50  3.40 - 10.80 10*3/mm3 Final    RBC 12/04/2023 4.37  4.14 - 5.80 10*6/mm3 Final    Hemoglobin 12/04/2023 13.8  13.0 - 17.7 g/dL Final    Hematocrit 12/04/2023 41.5  37.5 - 51.0 % Final    MCV 12/04/2023 95.0  79.0 - 97.0 fL Final    MCH 12/04/2023 31.6  26.6 - 33.0 pg Final    MCHC 12/04/2023 33.3  31.5 - 35.7 g/dL Final    RDW 12/04/2023 12.6  12.3 - 15.4 % Final    RDW-SD 12/04/2023 44.0  37.0 - 54.0 fl Final    MPV 12/04/2023 9.3  6.0 - 12.0 fL Final    Platelets 12/04/2023 412  140 - 450 10*3/mm3 Final    Neutrophil % 12/04/2023 48.2  42.7 - 76.0 % Final    Lymphocyte % 12/04/2023 34.2  19.6 - 45.3 % Final    Monocyte % 12/04/2023 14.0 (H)  5.0 - 12.0 % Final    Eosinophil % 12/04/2023 2.5  0.3 - 6.2 % Final    Basophil % 12/04/2023 0.8  0.0 - 1.5 % Final    Immature Grans % 12/04/2023 0.3  0.0 - 0.5 % Final    Neutrophils, Absolute 12/04/2023 3.14  1.70 - 7.00 10*3/mm3 Final    Lymphocytes, Absolute 12/04/2023 2.22  0.70 - 3.10 10*3/mm3 Final    Monocytes, Absolute 12/04/2023 0.91 (H)  0.10 - 0.90 10*3/mm3 Final    Eosinophils, Absolute 12/04/2023 0.16  0.00 - 0.40 10*3/mm3 Final    Basophils, Absolute 12/04/2023 0.05  0.00 - 0.20 10*3/mm3 Final    Immature Grans, Absolute 12/04/2023 0.02  0.00 - 0.05 10*3/mm3 Final    nRBC 12/04/2023 0.0  0.0 - 0.2 /100 WBC Final        Radiology Results        Assessment / Plan         Assessment and Plan   Diagnoses and all orders for this visit:    1. Essential hypertension (Primary)  Assessment & Plan:  Hypertension is improving with treatment.  Continue current treatment regimen.  Blood pressure will be reassessed at the next regular  appointment.    Advise that their target blood pressure readings are an average of <140/90.  Advise to follow a low salt diet.       Ordering a echo to ensure no evidence of pulmonary hypertension.    Orders:  -     Adult Transthoracic Echo Complete W/ Cont if Necessary Per Protocol; Future    2. Perforated diverticulum  Assessment & Plan:  Slowly healing from surgery.  He is scheduled for a takedown of the ileostomy soon.      3. Encounter for annual physical exam  -     CBC & Differential; Future  -     Comprehensive Metabolic Panel; Future  -     Hemoglobin A1c; Future  -     Lipid Panel; Future  -     TSH Rfx On Abnormal To Free T4; Future  -     Vitamin B12; Future  -     Vitamin D,25-Hydroxy; Future  -     PSA SCREENING; Future    4. Need for hepatitis C screening test  -     Hepatitis C Antibody; Future    5. Screening for malignant neoplasm of prostate  -     PSA SCREENING; Future          Discussed possible differential diagnoses, testing, treatment, recommended non-pharmacological interventions, risks, warning signs to monitor for that would indicate need for follow-up in clinic or ER. If no improvement with these regimens or you have new or worsening symptoms follow-up. Patient verbalizes understanding and agreement with plan of care. Denies further needs or concerns.     Patient was given instructions and counseling regarding her condition and for health maintenance advised.    BMI is >= 25 and <30. (Overweight) The following options were offered after discussion;: weight loss educational material (shared in after visit summary)             Health Maintenance  Health Maintenance:   Health Maintenance Due   Topic Date Due    HEPATITIS C SCREENING  Never done        Meds ordered during this visit  No orders of the defined types were placed in this encounter.      Meds stopped during this visit:  Medications Discontinued During This Encounter   Medication Reason    diphenhydrAMINE (Benadryl Allergy) 25 MG  tablet Patient Reported Not Taking    polyethylene glycol 17 g packet Patient Reported Not Taking        Visit Diagnoses    ICD-10-CM ICD-9-CM   1. Essential hypertension  I10 401.9   2. Perforated diverticulum  K57.80 562.10   3. Encounter for annual physical exam  Z00.00 V70.0   4. Need for hepatitis C screening test  Z11.59 V73.89   5. Screening for malignant neoplasm of prostate  Z12.5 V76.44         Patient was given instructions and counseling regarding his condition or for health maintenance advice. Please see specific information pulled into the AVS if appropriate.     Follow Up   Return in about 3 months (around 6/25/2024) for followup HTN .          This document has been electronically signed by Alissa Tony DO   March 25, 2024 12:54 EDT    Dictated Utilizing Dragon Dictation: Part of this note may be an electronic transcription/translation of spoken language to printed text using the Dragon Dictation System.    Alissa Tony D.O.  [facility] [place]

## 2024-03-26 LAB
25(OH)D3 SERPL-MCNC: 23.8 NG/ML (ref 30–100)
HCV AB SER DONR QL: NORMAL
VIT B12 BLD-MCNC: 348 PG/ML (ref 211–946)

## 2024-05-06 ENCOUNTER — HOSPITAL ENCOUNTER (OUTPATIENT)
Dept: CARDIOLOGY | Facility: HOSPITAL | Age: 59
Discharge: HOME | End: 2024-05-06
Admitting: FAMILY MEDICINE
Payer: COMMERCIAL

## 2024-05-06 DIAGNOSIS — I10 ESSENTIAL HYPERTENSION: ICD-10-CM

## 2024-05-06 LAB
BH CV ECHO LEFT VENTRICLE GLOBAL LONGITUDINAL STRAIN: -19 %
BH CV ECHO MEAS - AO MAX PG: 4.2 MMHG
BH CV ECHO MEAS - AO MEAN PG: 2.4 MMHG
BH CV ECHO MEAS - AO V2 MAX: 103 CM/SEC
BH CV ECHO MEAS - AO V2 VTI: 22.3 CM
BH CV ECHO MEAS - EF(MOD-BP): 51 %
BH CV ECHO MEAS - IVS/LVPW: 1.08 CM
BH CV ECHO MEAS - IVSD: 1.2 CM
BH CV ECHO MEAS - LA DIMENSION: 3.2 CM
BH CV ECHO MEAS - LV MAX PG: 3.3 MMHG
BH CV ECHO MEAS - LV MEAN PG: 1.7 MMHG
BH CV ECHO MEAS - LV V1 MAX: 91 CM/SEC
BH CV ECHO MEAS - LV V1 VTI: 20 CM
BH CV ECHO MEAS - LVIDD: 4.8 CM
BH CV ECHO MEAS - LVIDS: 3.6 CM
BH CV ECHO MEAS - LVPWD: 1.11 CM
BH CV ECHO MEAS - MV A MAX VEL: 40 CM/SEC
BH CV ECHO MEAS - MV E MAX VEL: 56 CM/SEC
BH CV ECHO MEAS - MV E/A: 1.4
BH CV ECHO MEAS - PA ACC TIME: 0.16 SEC
BH CV ECHO MEAS - PA V2 MAX: 61 CM/SEC
BH CV ECHO MEAS - RAP SYSTOLE: 3 MMHG
BH CV ECHO MEAS - RVSP: 17 MMHG
BH CV ECHO MEAS - TAPSE (>1.6): 1.86 CM
BH CV ECHO MEAS - TR MAX PG: 14.4 MMHG
BH CV ECHO MEAS - TR MAX VEL: 189.7 CM/SEC
BH CV XLRA - RV BASE: 2.96 CM
BH CV XLRA - RV LENGTH: 6.39 CM
BH CV XLRA - RV MID: 3.11 CM

## 2024-05-06 PROCEDURE — 93306 TTE W/DOPPLER COMPLETE: CPT

## 2024-05-28 ENCOUNTER — PREP FOR SURGERY (OUTPATIENT)
Dept: OTHER | Facility: HOSPITAL | Age: 59
End: 2024-05-28
Payer: COMMERCIAL

## 2024-05-31 ENCOUNTER — PRE-ADMISSION TESTING (OUTPATIENT)
Dept: PREADMISSION TESTING | Facility: HOSPITAL | Age: 59
End: 2024-05-31
Payer: COMMERCIAL

## 2024-05-31 ENCOUNTER — HOSPITAL ENCOUNTER (OUTPATIENT)
Dept: GENERAL RADIOLOGY | Facility: HOSPITAL | Age: 59
Discharge: HOME | End: 2024-05-31
Payer: COMMERCIAL

## 2024-05-31 VITALS — BODY MASS INDEX: 29.54 KG/M2 | WEIGHT: 210.98 LBS | HEIGHT: 71 IN

## 2024-05-31 LAB
ANION GAP SERPL CALCULATED.3IONS-SCNC: 10 MMOL/L (ref 5–15)
BUN SERPL-MCNC: 13 MG/DL (ref 6–20)
BUN/CREAT SERPL: 15.7 (ref 7–25)
CALCIUM SPEC-SCNC: 9.1 MG/DL (ref 8.6–10.5)
CHLORIDE SERPL-SCNC: 104 MMOL/L (ref 98–107)
CO2 SERPL-SCNC: 29 MMOL/L (ref 22–29)
CREAT SERPL-MCNC: 0.83 MG/DL (ref 0.76–1.27)
DEPRECATED RDW RBC AUTO: 46 FL (ref 37–54)
EGFRCR SERPLBLD CKD-EPI 2021: 102.1 ML/MIN/1.73
ERYTHROCYTE [DISTWIDTH] IN BLOOD BY AUTOMATED COUNT: 13.2 % (ref 12.3–15.4)
GLUCOSE SERPL-MCNC: 114 MG/DL (ref 65–99)
HBA1C MFR BLD: 5.4 % (ref 4.8–5.6)
HCT VFR BLD AUTO: 43.8 % (ref 37.5–51)
HGB BLD-MCNC: 14.7 G/DL (ref 13–17.7)
MCH RBC QN AUTO: 31.8 PG (ref 26.6–33)
MCHC RBC AUTO-ENTMCNC: 33.6 G/DL (ref 31.5–35.7)
MCV RBC AUTO: 94.8 FL (ref 79–97)
PLATELET # BLD AUTO: 218 10*3/MM3 (ref 140–450)
PMV BLD AUTO: 9.6 FL (ref 6–12)
POTASSIUM SERPL-SCNC: 4.9 MMOL/L (ref 3.5–5.2)
QT INTERVAL: 430 MS
QTC INTERVAL: 388 MS
RBC # BLD AUTO: 4.62 10*6/MM3 (ref 4.14–5.8)
SODIUM SERPL-SCNC: 143 MMOL/L (ref 136–145)
WBC NRBC COR # BLD AUTO: 5.38 10*3/MM3 (ref 3.4–10.8)

## 2024-05-31 PROCEDURE — 36415 COLL VENOUS BLD VENIPUNCTURE: CPT

## 2024-05-31 PROCEDURE — 80048 BASIC METABOLIC PNL TOTAL CA: CPT

## 2024-05-31 PROCEDURE — 93010 ELECTROCARDIOGRAM REPORT: CPT | Performed by: INTERNAL MEDICINE

## 2024-05-31 PROCEDURE — 85027 COMPLETE CBC AUTOMATED: CPT

## 2024-05-31 PROCEDURE — 71046 X-RAY EXAM CHEST 2 VIEWS: CPT

## 2024-05-31 PROCEDURE — 83036 HEMOGLOBIN GLYCOSYLATED A1C: CPT

## 2024-05-31 PROCEDURE — 93005 ELECTROCARDIOGRAM TRACING: CPT

## 2024-05-31 NOTE — PAT
Patient viewed general PAT education video as instructed in their preoperative information received from their surgeon.  Patient stated the general PAT education video was viewed in its entirety and survey completed.  Copies of PAT general education handouts (Incentive Spirometry, Meds to Beds Program, Patient Belongings, Pre-op skin preparation instructions, Blood Glucose testing, Visitor policy, Surgery FAQ, Code H) distributed to patient if not printed. Education related to the PAT pass and skin preparation for surgery (if applicable) completed in PAT as a reinforcement to PAT education video. Patient instructed to return PAT pass provided today as well as completed skin preparation sheet (if applicable) on the day of procedure.     Additionally if patient had not viewed video yet but intended to view it at home or in our waiting area, then referred them to the handout with QR code/link provided during PAT visit.  Instructed patient to complete survey after viewing the video in its entirety.  Encouraged patient/family to read [facility] general education handouts thoroughly and notify PAT staff with any questions or concerns. Patient verbalized understanding of all information and priority content.    An arrival time for procedure was not provided during PAT visit. If patient had any questions or concerns about their arrival time, they were instructed to contact their surgeon/physician.  Additionally, if the patient referred to an arrival time that was acquired from their my chart account, patient was encouraged to verify that time with their surgeon/physician. Arrival times are NOT provided in Pre Admission Testing Department.    Patient directed to Radiology Department for CXR after Pre Admission Testing Appointment.     Patient to apply Chlorhexadine wipes  to surgical area (as instructed) the night before procedure and the AM of procedure. Wipes provided.    Ten (8 ounce) Impact Advanced Recovery Nutritional Drinks  "distributed to patient from Pre Admission Testing Department.  Verbal and written instructions given that patient must consume two (8 ounce) Impact drinks a day for five days before surgery.  Flavoring tip sheet provided as well the brochure \"Go in Stronger. Get Home Sooner\" that explains benefits of nutritional drinks to enhance recovery. Patient/family verbalized understanding.     Patient instructed to drink 20 ounces of Gatorade or Gatorlyte (if diabetic) and it needs to be completed 1 hour (for Main OR patients) or 2 hours (scheduled  section & [facility] patients) before given arrival time for procedure (NO RED Gatorade and NO Gatorade Zero).    Patient verbalized understanding.    Too early to draw type and screen in PAT.  Please obtain blood bank specimen in pre-op on the day of surgery.  BLOOD PERMIT SIGNED AND ONE PINK TUBE SENT DURING PAT VISIT. BLUE NOTE ON CHART.    Patient denies any current skin issues.   COLOSTOMY DRESSING CLEAN, DRY, AND IN PLACE    MESSAGE LEFT WITH JODI RN, GI NAVIGATOR OF PATIENT'S UPCOMING SURGERY. PATIENT DENIES ANY QUESTIONS OR CONCERNS AT THIS TIME.  "

## 2024-06-12 ENCOUNTER — ANESTHESIA EVENT (OUTPATIENT)
Dept: PERIOP | Facility: HOSPITAL | Age: 59
End: 2024-06-12
Payer: COMMERCIAL

## 2024-06-12 RX ORDER — SODIUM CHLORIDE 0.9 % (FLUSH) 0.9 %
10 SYRINGE (ML) INJECTION AS NEEDED
Status: CANCELLED | OUTPATIENT
Start: 2024-06-12

## 2024-06-12 RX ORDER — SODIUM CHLORIDE 9 MG/ML
40 INJECTION, SOLUTION INTRAVENOUS AS NEEDED
Status: CANCELLED | OUTPATIENT
Start: 2024-06-12

## 2024-06-12 RX ORDER — FAMOTIDINE 10 MG/ML
20 INJECTION, SOLUTION INTRAVENOUS ONCE
Status: CANCELLED | OUTPATIENT
Start: 2024-06-12 | End: 2024-06-12

## 2024-06-13 ENCOUNTER — ANESTHESIA EVENT CONVERTED (OUTPATIENT)
Dept: ANESTHESIOLOGY | Facility: HOSPITAL | Age: 59
DRG: 331 | End: 2024-06-13
Payer: COMMERCIAL

## 2024-06-13 ENCOUNTER — HOSPITAL ENCOUNTER (INPATIENT)
Facility: HOSPITAL | Age: 59
LOS: 3 days | Discharge: HOME | DRG: 331 | End: 2024-06-16
Attending: COLON & RECTAL SURGERY | Admitting: COLON & RECTAL SURGERY
Payer: COMMERCIAL

## 2024-06-13 ENCOUNTER — ANESTHESIA (OUTPATIENT)
Dept: PERIOP | Facility: HOSPITAL | Age: 59
End: 2024-06-13
Payer: COMMERCIAL

## 2024-06-13 DIAGNOSIS — Z98.890 S/P COLOSTOMY TAKEDOWN: Primary | ICD-10-CM

## 2024-06-13 DIAGNOSIS — Z00.00 ENCOUNTER FOR ANNUAL PHYSICAL EXAM: ICD-10-CM

## 2024-06-13 DIAGNOSIS — K57.80 PERFORATED DIVERTICULUM: ICD-10-CM

## 2024-06-13 LAB
ABO GROUP BLD: NORMAL
BLD GP AB SCN SERPL QL: NEGATIVE
HCT VFR BLD AUTO: 41.6 % (ref 37.5–51)
HGB BLD-MCNC: 14.2 G/DL (ref 13–17.7)
POTASSIUM SERPL-SCNC: 3.9 MMOL/L (ref 3.5–5.2)
RH BLD: NEGATIVE
T&S EXPIRATION DATE: NORMAL

## 2024-06-13 PROCEDURE — 74420 UROGRAPHY RTRGR +-KUB: CPT | Performed by: UROLOGY

## 2024-06-13 PROCEDURE — 25010000002 AZTREONAM PER 500 MG: Performed by: COLON & RECTAL SURGERY

## 2024-06-13 PROCEDURE — 25010000002 HEPARIN (PORCINE) PER 1000 UNITS: Performed by: COLON & RECTAL SURGERY

## 2024-06-13 PROCEDURE — 25010000002 HYDROMORPHONE PER 4 MG: Performed by: COLON & RECTAL SURGERY

## 2024-06-13 PROCEDURE — 0TJB8ZZ INSPECTION OF BLADDER, VIA NATURAL OR ARTIFICIAL OPENING ENDOSCOPIC: ICD-10-PCS | Performed by: UROLOGY

## 2024-06-13 PROCEDURE — 86901 BLOOD TYPING SEROLOGIC RH(D): CPT | Performed by: COLON & RECTAL SURGERY

## 2024-06-13 PROCEDURE — 85014 HEMATOCRIT: CPT | Performed by: COLON & RECTAL SURGERY

## 2024-06-13 PROCEDURE — 84132 ASSAY OF SERUM POTASSIUM: CPT | Performed by: ANESTHESIOLOGY

## 2024-06-13 PROCEDURE — 25010000002 PROPOFOL 10 MG/ML EMULSION: Performed by: NURSE ANESTHETIST, CERTIFIED REGISTERED

## 2024-06-13 PROCEDURE — 25010000002 DEXAMETHASONE PER 1 MG: Performed by: NURSE ANESTHETIST, CERTIFIED REGISTERED

## 2024-06-13 PROCEDURE — 25010000002 ALBUMIN HUMAN 5% PER 50 ML: Performed by: NURSE ANESTHETIST, CERTIFIED REGISTERED

## 2024-06-13 PROCEDURE — 25810000003 LACTATED RINGERS PER 1000 ML: Performed by: ANESTHESIOLOGY

## 2024-06-13 PROCEDURE — 25010000002 HYDROMORPHONE 1 MG/ML SOLUTION

## 2024-06-13 PROCEDURE — 99221 1ST HOSP IP/OBS SF/LOW 40: CPT | Performed by: NURSE PRACTITIONER

## 2024-06-13 PROCEDURE — 25010000002 CLINDAMYCIN 900 MG/50ML SOLUTION: Performed by: COLON & RECTAL SURGERY

## 2024-06-13 PROCEDURE — C1758 CATHETER, URETERAL: HCPCS | Performed by: COLON & RECTAL SURGERY

## 2024-06-13 PROCEDURE — 25010000002 KETOROLAC TROMETHAMINE PER 15 MG

## 2024-06-13 PROCEDURE — 0T788DZ DILATION OF BILATERAL URETERS WITH INTRALUMINAL DEVICE, VIA NATURAL OR ARTIFICIAL OPENING ENDOSCOPIC: ICD-10-PCS | Performed by: COLON & RECTAL SURGERY

## 2024-06-13 PROCEDURE — P9041 ALBUMIN (HUMAN),5%, 50ML: HCPCS | Performed by: NURSE ANESTHETIST, CERTIFIED REGISTERED

## 2024-06-13 PROCEDURE — 52332 CYSTOSCOPY AND TREATMENT: CPT | Performed by: UROLOGY

## 2024-06-13 PROCEDURE — 25010000002 ONDANSETRON PER 1 MG: Performed by: NURSE ANESTHETIST, CERTIFIED REGISTERED

## 2024-06-13 PROCEDURE — 88304 TISSUE EXAM BY PATHOLOGIST: CPT | Performed by: COLON & RECTAL SURGERY

## 2024-06-13 PROCEDURE — 85018 HEMOGLOBIN: CPT | Performed by: COLON & RECTAL SURGERY

## 2024-06-13 PROCEDURE — 86900 BLOOD TYPING SEROLOGIC ABO: CPT | Performed by: COLON & RECTAL SURGERY

## 2024-06-13 PROCEDURE — 0DBM0ZZ EXCISION OF DESCENDING COLON, OPEN APPROACH: ICD-10-PCS | Performed by: COLON & RECTAL SURGERY

## 2024-06-13 PROCEDURE — 25010000002 FENTANYL CITRATE (PF) 50 MCG/ML SOLUTION

## 2024-06-13 PROCEDURE — 86850 RBC ANTIBODY SCREEN: CPT | Performed by: COLON & RECTAL SURGERY

## 2024-06-13 PROCEDURE — 25010000002 SUGAMMADEX 200 MG/2ML SOLUTION: Performed by: NURSE ANESTHETIST, CERTIFIED REGISTERED

## 2024-06-13 PROCEDURE — 25010000002 BUPIVACAINE (PF) 0.25 % SOLUTION: Performed by: NURSE ANESTHETIST, CERTIFIED REGISTERED

## 2024-06-13 PROCEDURE — 25010000002 FENTANYL CITRATE (PF) 100 MCG/2ML SOLUTION: Performed by: NURSE ANESTHETIST, CERTIFIED REGISTERED

## 2024-06-13 PROCEDURE — 25010000002 DEXAMETHASONE SODIUM PHOSPHATE 10 MG/ML SOLUTION: Performed by: NURSE ANESTHETIST, CERTIFIED REGISTERED

## 2024-06-13 PROCEDURE — C1769 GUIDE WIRE: HCPCS | Performed by: COLON & RECTAL SURGERY

## 2024-06-13 DEVICE — ECHELON CIRCULAR POWERED STAPLER
Type: IMPLANTABLE DEVICE | Site: ABDOMEN | Status: FUNCTIONAL
Brand: ECHELON CIRCULAR

## 2024-06-13 DEVICE — STAPLER WITH DST SERIES TECHNOLOGY
Type: IMPLANTABLE DEVICE | Site: ABDOMEN | Status: FUNCTIONAL
Brand: TA

## 2024-06-13 RX ORDER — MIDAZOLAM HYDROCHLORIDE 1 MG/ML
1 INJECTION INTRAMUSCULAR; INTRAVENOUS
Status: DISCONTINUED | OUTPATIENT
Start: 2024-06-13 | End: 2024-06-13 | Stop reason: HOSPADM

## 2024-06-13 RX ORDER — HYDROMORPHONE HYDROCHLORIDE 1 MG/ML
0.5 INJECTION, SOLUTION INTRAMUSCULAR; INTRAVENOUS; SUBCUTANEOUS
Status: DISCONTINUED | OUTPATIENT
Start: 2024-06-13 | End: 2024-06-13 | Stop reason: HOSPADM

## 2024-06-13 RX ORDER — ENOXAPARIN SODIUM 100 MG/ML
40 INJECTION SUBCUTANEOUS DAILY
Status: DISCONTINUED | OUTPATIENT
Start: 2024-06-14 | End: 2024-06-16 | Stop reason: HOSPADM

## 2024-06-13 RX ORDER — KETOROLAC TROMETHAMINE 15 MG/ML
15 INJECTION, SOLUTION INTRAMUSCULAR; INTRAVENOUS EVERY 6 HOURS PRN
Status: DISCONTINUED | OUTPATIENT
Start: 2024-06-13 | End: 2024-06-13

## 2024-06-13 RX ORDER — FAMOTIDINE 20 MG/1
20 TABLET, FILM COATED ORAL ONCE
Status: DISCONTINUED | OUTPATIENT
Start: 2024-06-13 | End: 2024-06-13 | Stop reason: HOSPADM

## 2024-06-13 RX ORDER — ACETAMINOPHEN 325 MG/1
650 TABLET ORAL EVERY 4 HOURS PRN
Status: DISCONTINUED | OUTPATIENT
Start: 2024-06-13 | End: 2024-06-16 | Stop reason: HOSPADM

## 2024-06-13 RX ORDER — FLUTICASONE PROPIONATE 50 MCG
2 SPRAY, SUSPENSION (ML) NASAL DAILY PRN
Status: DISCONTINUED | OUTPATIENT
Start: 2024-06-13 | End: 2024-06-16 | Stop reason: HOSPADM

## 2024-06-13 RX ORDER — DEXAMETHASONE SODIUM PHOSPHATE 4 MG/ML
INJECTION, SOLUTION INTRA-ARTICULAR; INTRALESIONAL; INTRAMUSCULAR; INTRAVENOUS; SOFT TISSUE AS NEEDED
Status: DISCONTINUED | OUTPATIENT
Start: 2024-06-13 | End: 2024-06-13 | Stop reason: SURG

## 2024-06-13 RX ORDER — FAMOTIDINE 20 MG/1
40 TABLET, FILM COATED ORAL DAILY
Status: DISCONTINUED | OUTPATIENT
Start: 2024-06-14 | End: 2024-06-16 | Stop reason: HOSPADM

## 2024-06-13 RX ORDER — ONDANSETRON 2 MG/ML
INJECTION INTRAMUSCULAR; INTRAVENOUS AS NEEDED
Status: DISCONTINUED | OUTPATIENT
Start: 2024-06-13 | End: 2024-06-13 | Stop reason: SURG

## 2024-06-13 RX ORDER — ONDANSETRON 2 MG/ML
4 INJECTION INTRAMUSCULAR; INTRAVENOUS EVERY 6 HOURS PRN
Status: DISCONTINUED | OUTPATIENT
Start: 2024-06-13 | End: 2024-06-16 | Stop reason: HOSPADM

## 2024-06-13 RX ORDER — KETOROLAC TROMETHAMINE 30 MG/ML
30 INJECTION, SOLUTION INTRAMUSCULAR; INTRAVENOUS EVERY 6 HOURS PRN
Status: DISCONTINUED | OUTPATIENT
Start: 2024-06-13 | End: 2024-06-16 | Stop reason: HOSPADM

## 2024-06-13 RX ORDER — LIDOCAINE HYDROCHLORIDE 10 MG/ML
INJECTION, SOLUTION EPIDURAL; INFILTRATION; INTRACAUDAL; PERINEURAL AS NEEDED
Status: DISCONTINUED | OUTPATIENT
Start: 2024-06-13 | End: 2024-06-13 | Stop reason: SURG

## 2024-06-13 RX ORDER — SODIUM CHLORIDE, SODIUM LACTATE, POTASSIUM CHLORIDE, CALCIUM CHLORIDE 600; 310; 30; 20 MG/100ML; MG/100ML; MG/100ML; MG/100ML
9 INJECTION, SOLUTION INTRAVENOUS CONTINUOUS
Status: DISCONTINUED | OUTPATIENT
Start: 2024-06-13 | End: 2024-06-13

## 2024-06-13 RX ORDER — PREGABALIN 75 MG/1
75 CAPSULE ORAL ONCE
Status: COMPLETED | OUTPATIENT
Start: 2024-06-13 | End: 2024-06-13

## 2024-06-13 RX ORDER — KETOROLAC TROMETHAMINE 15 MG/ML
INJECTION, SOLUTION INTRAMUSCULAR; INTRAVENOUS
Status: COMPLETED
Start: 2024-06-13 | End: 2024-06-13

## 2024-06-13 RX ORDER — EPHEDRINE SULFATE 50 MG/ML
INJECTION INTRAVENOUS AS NEEDED
Status: DISCONTINUED | OUTPATIENT
Start: 2024-06-13 | End: 2024-06-13 | Stop reason: SURG

## 2024-06-13 RX ORDER — TRIAMTERENE AND HYDROCHLOROTHIAZIDE 37.5; 25 MG/1; MG/1
1 TABLET ORAL DAILY
Status: DISCONTINUED | OUTPATIENT
Start: 2024-06-14 | End: 2024-06-16 | Stop reason: HOSPADM

## 2024-06-13 RX ORDER — CLINDAMYCIN PHOSPHATE 900 MG/50ML
900 INJECTION, SOLUTION INTRAVENOUS
Status: COMPLETED | OUTPATIENT
Start: 2024-06-13 | End: 2024-06-13

## 2024-06-13 RX ORDER — HYDROCODONE BITARTRATE AND ACETAMINOPHEN 5; 325 MG/1; MG/1
1 TABLET ORAL EVERY 4 HOURS PRN
Status: DISCONTINUED | OUTPATIENT
Start: 2024-06-13 | End: 2024-06-16 | Stop reason: HOSPADM

## 2024-06-13 RX ORDER — HYDROMORPHONE HYDROCHLORIDE 1 MG/ML
0.5 INJECTION, SOLUTION INTRAMUSCULAR; INTRAVENOUS; SUBCUTANEOUS
Status: DISCONTINUED | OUTPATIENT
Start: 2024-06-13 | End: 2024-06-16 | Stop reason: HOSPADM

## 2024-06-13 RX ORDER — ATENOLOL 50 MG/1
50 TABLET ORAL DAILY
Status: DISCONTINUED | OUTPATIENT
Start: 2024-06-14 | End: 2024-06-16 | Stop reason: HOSPADM

## 2024-06-13 RX ORDER — NALOXONE HCL 0.4 MG/ML
0.4 VIAL (ML) INJECTION
Status: DISCONTINUED | OUTPATIENT
Start: 2024-06-13 | End: 2024-06-16 | Stop reason: HOSPADM

## 2024-06-13 RX ORDER — FENTANYL CITRATE 50 UG/ML
INJECTION, SOLUTION INTRAMUSCULAR; INTRAVENOUS AS NEEDED
Status: DISCONTINUED | OUTPATIENT
Start: 2024-06-13 | End: 2024-06-13 | Stop reason: SURG

## 2024-06-13 RX ORDER — PROPOFOL 10 MG/ML
VIAL (ML) INTRAVENOUS AS NEEDED
Status: DISCONTINUED | OUTPATIENT
Start: 2024-06-13 | End: 2024-06-13 | Stop reason: SURG

## 2024-06-13 RX ORDER — HEPARIN SODIUM 5000 [USP'U]/ML
5000 INJECTION, SOLUTION INTRAVENOUS; SUBCUTANEOUS ONCE
Status: COMPLETED | OUTPATIENT
Start: 2024-06-13 | End: 2024-06-13

## 2024-06-13 RX ORDER — MELOXICAM 15 MG/1
15 TABLET ORAL ONCE
Status: COMPLETED | OUTPATIENT
Start: 2024-06-13 | End: 2024-06-13

## 2024-06-13 RX ORDER — ROCURONIUM BROMIDE 10 MG/ML
INJECTION, SOLUTION INTRAVENOUS AS NEEDED
Status: DISCONTINUED | OUTPATIENT
Start: 2024-06-13 | End: 2024-06-13 | Stop reason: SURG

## 2024-06-13 RX ORDER — SCOLOPAMINE TRANSDERMAL SYSTEM 1 MG/1
1 PATCH, EXTENDED RELEASE TRANSDERMAL CONTINUOUS
Status: DISCONTINUED | OUTPATIENT
Start: 2024-06-13 | End: 2024-06-15 | Stop reason: SDUPTHER

## 2024-06-13 RX ORDER — ALVIMOPAN 12 MG/1
12 CAPSULE ORAL ONCE
Status: COMPLETED | OUTPATIENT
Start: 2024-06-13 | End: 2024-06-13

## 2024-06-13 RX ORDER — BUPIVACAINE HYDROCHLORIDE 2.5 MG/ML
INJECTION, SOLUTION EPIDURAL; INFILTRATION; INTRACAUDAL
Status: COMPLETED | OUTPATIENT
Start: 2024-06-13 | End: 2024-06-13

## 2024-06-13 RX ORDER — MAGNESIUM HYDROXIDE 1200 MG/15ML
LIQUID ORAL AS NEEDED
Status: DISCONTINUED | OUTPATIENT
Start: 2024-06-13 | End: 2024-06-13 | Stop reason: HOSPADM

## 2024-06-13 RX ORDER — FENTANYL CITRATE 50 UG/ML
50 INJECTION, SOLUTION INTRAMUSCULAR; INTRAVENOUS
Status: DISCONTINUED | OUTPATIENT
Start: 2024-06-13 | End: 2024-06-13 | Stop reason: HOSPADM

## 2024-06-13 RX ORDER — ONDANSETRON 2 MG/ML
4 INJECTION INTRAMUSCULAR; INTRAVENOUS ONCE AS NEEDED
Status: DISCONTINUED | OUTPATIENT
Start: 2024-06-13 | End: 2024-06-13 | Stop reason: HOSPADM

## 2024-06-13 RX ORDER — SODIUM CHLORIDE 0.9 % (FLUSH) 0.9 %
10 SYRINGE (ML) INJECTION EVERY 12 HOURS SCHEDULED
Status: DISCONTINUED | OUTPATIENT
Start: 2024-06-13 | End: 2024-06-13 | Stop reason: HOSPADM

## 2024-06-13 RX ORDER — FENTANYL CITRATE 50 UG/ML
INJECTION, SOLUTION INTRAMUSCULAR; INTRAVENOUS
Status: COMPLETED
Start: 2024-06-13 | End: 2024-06-13

## 2024-06-13 RX ORDER — DROPERIDOL 2.5 MG/ML
0.62 INJECTION, SOLUTION INTRAMUSCULAR; INTRAVENOUS ONCE AS NEEDED
Status: DISCONTINUED | OUTPATIENT
Start: 2024-06-13 | End: 2024-06-13 | Stop reason: HOSPADM

## 2024-06-13 RX ORDER — ALVIMOPAN 12 MG/1
12 CAPSULE ORAL 2 TIMES DAILY
Status: DISCONTINUED | OUTPATIENT
Start: 2024-06-14 | End: 2024-06-14

## 2024-06-13 RX ORDER — ALBUMIN, HUMAN INJ 5% 5 %
SOLUTION INTRAVENOUS CONTINUOUS PRN
Status: DISCONTINUED | OUTPATIENT
Start: 2024-06-13 | End: 2024-06-13 | Stop reason: SURG

## 2024-06-13 RX ORDER — DEXTROSE MONOHYDRATE, SODIUM CHLORIDE, AND POTASSIUM CHLORIDE 50; 1.49; 4.5 G/1000ML; G/1000ML; G/1000ML
100 INJECTION, SOLUTION INTRAVENOUS CONTINUOUS
Status: DISCONTINUED | OUTPATIENT
Start: 2024-06-13 | End: 2024-06-14

## 2024-06-13 RX ORDER — DEXAMETHASONE SODIUM PHOSPHATE 10 MG/ML
INJECTION, SOLUTION INTRAMUSCULAR; INTRAVENOUS
Status: COMPLETED | OUTPATIENT
Start: 2024-06-13 | End: 2024-06-13

## 2024-06-13 RX ORDER — CLINDAMYCIN PHOSPHATE 900 MG/50ML
900 INJECTION, SOLUTION INTRAVENOUS EVERY 8 HOURS
Qty: 150 ML | Refills: 0 | Status: COMPLETED | OUTPATIENT
Start: 2024-06-13 | End: 2024-06-14

## 2024-06-13 RX ORDER — LIDOCAINE HYDROCHLORIDE 10 MG/ML
0.5 INJECTION, SOLUTION EPIDURAL; INFILTRATION; INTRACAUDAL; PERINEURAL ONCE AS NEEDED
Status: COMPLETED | OUTPATIENT
Start: 2024-06-13 | End: 2024-06-13

## 2024-06-13 RX ORDER — ACETAMINOPHEN 500 MG
1000 TABLET ORAL ONCE
Status: COMPLETED | OUTPATIENT
Start: 2024-06-13 | End: 2024-06-13

## 2024-06-13 RX ADMIN — HYDROMORPHONE HYDROCHLORIDE 0.5 MG: 1 INJECTION, SOLUTION INTRAMUSCULAR; INTRAVENOUS; SUBCUTANEOUS at 12:39

## 2024-06-13 RX ADMIN — PROPOFOL 200 MG: 10 INJECTION, EMULSION INTRAVENOUS at 09:24

## 2024-06-13 RX ADMIN — HYDROCODONE BITARTRATE AND ACETAMINOPHEN 1 TABLET: 5; 325 TABLET ORAL at 15:31

## 2024-06-13 RX ADMIN — AZTREONAM 2 G: 2 INJECTION, POWDER, LYOPHILIZED, FOR SOLUTION INTRAMUSCULAR; INTRAVENOUS at 09:29

## 2024-06-13 RX ADMIN — POTASSIUM CHLORIDE, DEXTROSE MONOHYDRATE AND SODIUM CHLORIDE 100 ML/HR: 150; 5; 450 INJECTION, SOLUTION INTRAVENOUS at 16:35

## 2024-06-13 RX ADMIN — EPHEDRINE SULFATE 10 MG: 50 INJECTION INTRAVENOUS at 11:47

## 2024-06-13 RX ADMIN — DEXAMETHASONE SODIUM PHOSPHATE 4 MG: 10 INJECTION, SOLUTION INTRAMUSCULAR; INTRAVENOUS at 09:27

## 2024-06-13 RX ADMIN — HYDROMORPHONE HYDROCHLORIDE 0.5 MG: 1 INJECTION, SOLUTION INTRAMUSCULAR; INTRAVENOUS; SUBCUTANEOUS at 12:30

## 2024-06-13 RX ADMIN — FENTANYL CITRATE 50 MCG: 50 INJECTION, SOLUTION INTRAMUSCULAR; INTRAVENOUS at 13:00

## 2024-06-13 RX ADMIN — KETOROLAC TROMETHAMINE 15 MG: 15 INJECTION, SOLUTION INTRAMUSCULAR; INTRAVENOUS at 13:20

## 2024-06-13 RX ADMIN — KETOROLAC TROMETHAMINE 15 MG: 15 INJECTION INTRAMUSCULAR at 13:20

## 2024-06-13 RX ADMIN — ONDANSETRON 4 MG: 2 INJECTION INTRAMUSCULAR; INTRAVENOUS at 12:01

## 2024-06-13 RX ADMIN — HYDROMORPHONE HYDROCHLORIDE 0.5 MG: 1 INJECTION, SOLUTION INTRAMUSCULAR; INTRAVENOUS; SUBCUTANEOUS at 12:45

## 2024-06-13 RX ADMIN — ROCURONIUM BROMIDE 50 MG: 10 INJECTION INTRAVENOUS at 09:24

## 2024-06-13 RX ADMIN — SUGAMMADEX 200 MG: 100 INJECTION, SOLUTION INTRAVENOUS at 12:06

## 2024-06-13 RX ADMIN — BUPIVACAINE HYDROCHLORIDE 60 ML: 2.5 INJECTION, SOLUTION EPIDURAL; INFILTRATION; INTRACAUDAL; PERINEURAL at 09:27

## 2024-06-13 RX ADMIN — SCOPOLAMINE 1 PATCH: 1.5 PATCH, EXTENDED RELEASE TRANSDERMAL at 08:19

## 2024-06-13 RX ADMIN — SODIUM CHLORIDE, POTASSIUM CHLORIDE, SODIUM LACTATE AND CALCIUM CHLORIDE 9 ML/HR: 600; 310; 30; 20 INJECTION, SOLUTION INTRAVENOUS at 08:19

## 2024-06-13 RX ADMIN — MELOXICAM 15 MG: 15 TABLET ORAL at 08:19

## 2024-06-13 RX ADMIN — ROCURONIUM BROMIDE 10 MG: 10 INJECTION INTRAVENOUS at 11:47

## 2024-06-13 RX ADMIN — ALVIMOPAN 12 MG: 12 CAPSULE ORAL at 08:19

## 2024-06-13 RX ADMIN — PREGABALIN 75 MG: 75 CAPSULE ORAL at 08:19

## 2024-06-13 RX ADMIN — LIDOCAINE HYDROCHLORIDE 50 MG: 10 INJECTION, SOLUTION EPIDURAL; INFILTRATION; INTRACAUDAL; PERINEURAL at 09:24

## 2024-06-13 RX ADMIN — CLINDAMYCIN IN 5 PERCENT DEXTROSE 900 MG: 18 INJECTION, SOLUTION INTRAVENOUS at 18:43

## 2024-06-13 RX ADMIN — HEPARIN SODIUM 5000 UNITS: 5000 INJECTION INTRAVENOUS; SUBCUTANEOUS at 08:19

## 2024-06-13 RX ADMIN — ACETAMINOPHEN 1000 MG: 500 TABLET ORAL at 08:19

## 2024-06-13 RX ADMIN — LIDOCAINE HYDROCHLORIDE 0.5 ML: 10 INJECTION, SOLUTION EPIDURAL; INFILTRATION; INTRACAUDAL; PERINEURAL at 08:19

## 2024-06-13 RX ADMIN — HYDROMORPHONE HYDROCHLORIDE 0.5 MG: 1 INJECTION, SOLUTION INTRAMUSCULAR; INTRAVENOUS; SUBCUTANEOUS at 17:43

## 2024-06-13 RX ADMIN — ALBUMIN (HUMAN): 12.5 INJECTION, SOLUTION INTRAVENOUS at 10:22

## 2024-06-13 RX ADMIN — EPHEDRINE SULFATE 20 MG: 50 INJECTION INTRAVENOUS at 09:41

## 2024-06-13 RX ADMIN — HYDROCODONE BITARTRATE AND ACETAMINOPHEN 1 TABLET: 5; 325 TABLET ORAL at 21:48

## 2024-06-13 RX ADMIN — AZTREONAM 1000 MG: 1 INJECTION, POWDER, LYOPHILIZED, FOR SOLUTION INTRAMUSCULAR; INTRAVENOUS at 17:36

## 2024-06-13 RX ADMIN — FENTANYL CITRATE 50 MCG: 50 INJECTION, SOLUTION INTRAMUSCULAR; INTRAVENOUS at 12:45

## 2024-06-13 RX ADMIN — CLINDAMYCIN PHOSPHATE 900 MG: 900 INJECTION, SOLUTION INTRAVENOUS at 09:35

## 2024-06-13 RX ADMIN — FENTANYL CITRATE 100 MCG: 50 INJECTION, SOLUTION INTRAMUSCULAR; INTRAVENOUS at 09:24

## 2024-06-13 RX ADMIN — EPHEDRINE SULFATE 20 MG: 50 INJECTION INTRAVENOUS at 10:15

## 2024-06-13 RX ADMIN — DEXAMETHASONE SODIUM PHOSPHATE 8 MG: 4 INJECTION INTRA-ARTICULAR; INTRALESIONAL; INTRAMUSCULAR; INTRAVENOUS; SOFT TISSUE at 09:29

## 2024-06-13 RX ADMIN — SODIUM CHLORIDE, POTASSIUM CHLORIDE, SODIUM LACTATE AND CALCIUM CHLORIDE: 600; 310; 30; 20 INJECTION, SOLUTION INTRAVENOUS at 10:22

## 2024-06-13 RX ADMIN — PROPOFOL 25 MCG/KG/MIN: 10 INJECTION, EMULSION INTRAVENOUS at 09:29

## 2024-06-13 NOTE — INTERVAL H&P NOTE
[facility] Pre-op    Full history and physical note from office is attached.    VS: /96  HR 62  RR 16  T 98.3  Sat 98%RA    Immunizations:  Influenza:  No  Pneumococcal:  No  Tetanus:  UTD  Covid : x1    Review of Systems:  Constitutional-- No fever, chills or sweats. No fatigue.  CV-- No chest pain, palpitation or syncope  Resp-- No SOB, cough, hemoptysis  Skin--No rashes or lesions    Physical Exam:  Heart:   Regular rate and rhythm, S1 and S2 normal  Lungs: Clear to auscultation bilaterally, respirations unlabored    LAB Results:  Lab Results   Component Value Date    WBC 5.38 05/31/2024    HGB 14.7 05/31/2024    HCT 43.8 05/31/2024    MCV 94.8 05/31/2024     05/31/2024    NEUTROABS 5.88 03/25/2024    GLUCOSE 114 (H) 05/31/2024    BUN 13 05/31/2024    CREATININE 0.83 05/31/2024     05/31/2024    K 4.9 05/31/2024     05/31/2024    CO2 29.0 05/31/2024    MG 2.0 11/25/2022    CALCIUM 9.1 05/31/2024    ALBUMIN 4.5 03/25/2024    AST 25 03/25/2024    ALT 36 03/25/2024    BILITOT 0.5 03/25/2024         Impression: Unwanted stoma; hx perforated diverticulitis; s/p sigmoid colectomy with colostomy 11/20/23      Plan: OPEN COLOSTOMY TAKEDOWN; CYSTOSCOPY TEMPORARY PLACEMENT BILATERAL URETERAL CATHETERS       FARA Tovar   6/13/2024   07:54 EDT

## 2024-06-13 NOTE — ANESTHESIA PROCEDURE NOTES
"Peripheral Block      Patient reassessed immediately prior to procedure    Patient location during procedure: OR  Start time: 6/13/2024 9:27 AM  Reason for block: at surgeon's request and post-op pain management  Performed by  CRNA/CAA: Telly Bailey CRNA  Preanesthetic Checklist  Completed: patient identified, IV checked, site marked, risks and benefits discussed, surgical consent, monitors and equipment checked, pre-op evaluation and timeout performed  Prep:  Pt Position: supine  Sterile barriers:cap, gloves, mask and washed/disinfected hands  Prep: ChloraPrep  Patient monitoring: blood pressure monitoring, continuous pulse oximetry and EKG  Procedure    Sedation: yes  Performed under: general  Guidance:ultrasound guided  Images:still images obtained, printed/placed on chart    Laterality:Bilateral  Block Type:TAP  Injection Technique:single-shot  Needle Type:short-bevel and echogenic  Needle Gauge:20 G  Resistance on Injection: none    Medications Used: bupivacaine PF (MARCAINE) 0.25 % injection - Injection   60 mL - 6/13/2024 9:27:00 AM  dexamethasone sodium phosphate injection - Injection   4 mg - 6/13/2024 9:27:00 AM      Medications  Comment:Block Injection:  LA dose divided between Right and Left block        Post Assessment  Injection Assessment: negative aspiration for heme, incremental injection and no paresthesia on injection  Patient Tolerance:comfortable throughout block  Complications:no  Additional Notes    Subcostal TAPs    A high-frequency linear transducer, with sterile cover, was placed sub-xiphoid to identify [place], right and left Rectus Abdominus Muscles (RK). The transducer was moved either right or left subcostally to identify the RK and the Transverse Abdominus Muscle (BRAR). The insertion site was prepped in sterile fashion and then localized with 2-5 ml of 1% Lidocaine. Using ultrasound-guidance, a 20-gauge B-Monge 4\" Ultraplex 360 non-stimulating echogenic needle was advanced in " [place] Approved    Prior Authorization Number: [id number]  Dates of approval: 12/01/2023-06/01/2024  Filling Pharmacy: [facility]  Additional Information:     1st trial initial - must lose 5% body weight to qualify for renewal     "plane, from medial to lateral, until the tip of the needle was in the fascial plane between the RK and BRAR. 1-3ml of preservative free normal saline was used to hydro-dissect the fascial planes. After the fascial plane was verified, the local anesthetic (LA) was injected. The procedure was repeated on the opposite side for bilateral coverage. Aspiration every 5 ml to prevent intravascular injection. Injection was completed with negative aspiration of blood and negative intravascular injection. Injection pressures were normal with minimal resistance. The subcostal approach to the TAP nerve block ideally anesthetizes the intercostal nerves T6-T9.     Mid-Axillary/Lateral TAPs    A high-frequency linear transducer, with sterile cover, was placed in the midaxillary line between the ASIS and costal margin. The External Oblique Muscle (EOM), Internal Oblique Muscle (IOM), Transverse Abdominus Muscle (BRAR), and Peritoneum were identified. The insertion site was prepped in sterile fashion and then localized with 2-5 ml of 1% Lidocaine. Using ultrasound-guidance, a 20-gauge B-Monge 4\" Ultraplex 360 non-stimulating echogenic needle was advanced in plane, from medial to lateral, until the tip of the needle was in the fascial plane between the IOM and BRAR. 1-3ml of preservative free normal saline was used to hydro-dissect the fascial planes. After the fascial plane was verified, the local anesthetic (LA) was injected. The procedure was repeated on the opposite side for bilateral coverage. Aspiration every 5 ml to prevent intravascular injection. Injection was completed with negative aspiration of blood and negative intravascular injection. Injection pressures were normal with minimal resistance. Midaxillary TAPs should reach intercostal nerves T10- T11 and the subcostal nerve T12.    Performed by: Donavon Moreno, CRNA          "

## 2024-06-13 NOTE — ANESTHESIA PROCEDURE NOTES
Airway  Urgency: elective    Date/Time: 6/13/2024 9:26 AM  Airway not difficult    General Information and Staff    Patient location during procedure: OR  CRNA/CAA: Donavon Moreno CRNA    Indications and Patient Condition  Indications for airway management: airway protection    Preoxygenated: yes  MILS not maintained throughout  Mask difficulty assessment: 1 - vent by mask    Final Airway Details  Final airway type: endotracheal airway      Successful airway: ETT  Cuffed: yes   Successful intubation technique: direct laryngoscopy  Facilitating devices/methods: intubating stylet  Endotracheal tube insertion site: oral  Blade: Prather  Blade size: 2  ETT size (mm): 7.0  Cormack-Lehane Classification: grade I - full view of glottis  Placement verified by: chest auscultation and capnometry   Cuff volume (mL): 8  Measured from: lips  ETT/EBT  to lips (cm): 20  Number of attempts at approach: 1  Assessment: lips, teeth, and gum same as pre-op and atraumatic intubation    Additional Comments  Negative epigastric sounds, Breath sound equal bilaterally with symmetric chest rise and fall

## 2024-06-13 NOTE — ANESTHESIA PREPROCEDURE EVALUATION
Anesthesia Evaluation     Patient summary reviewed and Nursing notes reviewed                Airway   Mallampati: II  TM distance: >3 FB  Neck ROM: full  No difficulty expected  Dental - normal exam     Pulmonary - normal exam   (+) a smoker (1981) Former,  Cardiovascular - normal exam    (+) hypertension    ROS comment:   Echo 5/24: normal EF, no significant valve disease    Neuro/Psych- negative ROS  GI/Hepatic/Renal/Endo    (+) GERD, renal disease- CRI    ROS Comment: Diverticulitis s/p colectomy/colostomy     Musculoskeletal (-) negative ROS    Abdominal  - normal exam    Bowel sounds: normal.   Substance History - negative use     OB/GYN negative ob/gyn ROS         Other                          Anesthesia Plan    ASA 3     general with block     intravenous induction     Anesthetic plan, risks, benefits, and alternatives have been provided, discussed and informed consent has been obtained with: patient.    Plan discussed with CRNA.        CODE STATUS:

## 2024-06-13 NOTE — CONSULTS
[facility] Medicine Services  CONSULT NOTE      Patient Name: Lobo Yu  : 1965  MRN: [id number]    Primary Care Physician: Alissa Tony DO  Provider requesting consultation: Abad Botello MD    Subjective   Subjective     Reason for Consultation:  Medical management     HPI:  Lobo Yu is a 58 y.o. male with PMH of GERD, Diverticulitis with colostomy, HTN, kidney stones. Patient was admitted for planned colostomy take down by Dr. Botello on 24. We were asked to see the patient post op for medical management.     Patient was sitting up in bed with family at bedside. He has been up walking in halls. He has belched and is tolerating liquids. Pain is controlled.       Review of Systems  Gen- No fevers, chills  CV- No chest pain, palpitations  Resp- No cough, dyspnea  GI- No N/V/D, abd pain    All other systems reviewed and are negative.     Personal History     Past Medical History:   Diagnosis Date    Diverticulitis     GERD (gastroesophageal reflux disease)     Hypertension     Kidney stones     X2 EPISODES    Wears glasses        Past Surgical History:   Procedure Laterality Date    APPENDECTOMY      COLONOSCOPY      EXPLORATORY LAPAROTOMY N/A 2023    Procedure: EXPLORATORY LAPAROTOMY, SIGMOID COLECTOMY WITH COLOSTOMY CREAION, INTRA-ABDOMINAL ABSCESS DRAINAGE, APPENDECTOMY;  Surgeon: Hitesh Vallecillo MD;  Location: [facility];  Service: General;  Laterality: N/A;    NEPHROLITHOTOMY      TONSILLECTOMY      WISDOM TOOTH EXTRACTION         Family History:  family history is not on file. Otherwise pertinent FHx was reviewed and unremarkable.     Social History:  reports that he quit smoking about 43 years ago. His smoking use included cigarettes. He started smoking about 30 years ago. He has a 15.2 pack-year smoking history. He has been exposed to tobacco smoke. He has never used smokeless tobacco. He reports that he does not drink alcohol  and does not use drugs.    Medications:  aspirin, atenolol, dicyclomine, docusate sodium, famotidine, fluticasone, omeprazole, triamterene-hydrochlorothiazide, and valACYclovir    Scheduled Meds:[START ON 6/14/2024] alvimopan, 12 mg, Oral, BID  [START ON 6/14/2024] atenolol, 50 mg, Oral, Daily  clindamycin, 900 mg, Intravenous, Q8H   And  aztreonam, 1,000 mg, Intravenous, Q8H  [START ON 6/14/2024] enoxaparin, 40 mg, Subcutaneous, Daily  [START ON 6/14/2024] famotidine, 40 mg, Oral, Daily  [START ON 6/14/2024] triamterene-hydrochlorothiazide, 1 tablet, Oral, Daily      Continuous Infusions:dextrose 5 % and sodium chloride 0.45 % with KCl 20 mEq/L, 100 mL/hr, Last Rate: 100 mL/hr (06/13/24 1635)  Scopolamine, 1 patch      PRN Meds:.  acetaminophen    fluticasone    HYDROcodone-acetaminophen    HYDROmorphone **AND** naloxone    ondansetron    Allergies   Allergen Reactions    Erythromycin GI Intolerance       Objective   Objective     Vital Signs:   Temp:  [97 °F (36.1 °C)-98.3 °F (36.8 °C)] 97.3 °F (36.3 °C)  Heart Rate:  [57-67] 58  Resp:  [16-20] 16  BP: ()/(53-96) 117/73  Flow (L/min):  [2] 2    Physical Exam  Constitutional: No acute distress, awake, alert  HENT: NCAT, mucous membranes moist  Respiratory: Clear to auscultation bilaterally, respiratory effort normal room air 97%  Cardiovascular: RRR, no murmurs, rubs, or gallops  Gastrointestinal: hypoactive  bowel sounds, soft, nontender, nondistended  Musculoskeletal: No bilateral ankle edema  Psychiatric: Appropriate affect, cooperative  Neurologic: Oriented x 3, strength symmetric in all extremities, Cranial Nerves grossly intact to confrontation, speech clear  Skin: No rashes   duran with bloody urine          Result Review:  I have personally reviewed the results from the time of admission and agree with these findings:  [x]  Laboratory  [x]  Radiology  []  EKG/Telemetry   []  Pathology  []  Old records  []  Other:  Most notable findings include:      LAB RESULTS:      Lab 06/13/24  1336   HEMOGLOBIN 14.2   HEMATOCRIT 41.6         Lab 06/13/24  0816   POTASSIUM 3.9                     Lab 06/13/24  0802   ABO TYPING O   RH TYPING Negative   ANTIBODY SCREEN Negative         Brief Urine Lab Results  (Last result in the past 365 days)        Color   Clarity   Blood   Leuk Est   Nitrite   Protein   CREAT   Urine HCG        11/20/23 1147 Yellow   Clear   Negative   Small (1+)   Negative   Negative                 Microbiology Results (last 10 days)       ** No results found for the last 240 hours. **            Peripheral Block    Result Date: 6/13/2024  Donavon Moreno CRNA     6/13/2024  9:43 AM Peripheral Block Patient reassessed immediately prior to procedure Patient location during procedure: OR Start time: 6/13/2024 9:27 AM Reason for block: at surgeon's request and post-op pain management Performed by CRNA/CAA: Telly Bailey CRNA Preanesthetic Checklist Completed: patient identified, IV checked, site marked, risks and benefits discussed, surgical consent, monitors and equipment checked, pre-op evaluation and timeout performed Prep: Pt Position: supine Sterile barriers:cap, gloves, mask and washed/disinfected hands Prep: ChloraPrep Patient monitoring: blood pressure monitoring, continuous pulse oximetry and EKG Procedure Sedation: yes Performed under: general Guidance:ultrasound guided Images:still images obtained, printed/placed on chart Laterality:Bilateral Block Type:TAP Injection Technique:single-shot Needle Type:short-bevel and echogenic Needle Gauge:20 G Resistance on Injection: none Medications Used: bupivacaine PF (MARCAINE) 0.25 % injection - Injection  60 mL - 6/13/2024 9:27:00 AM dexamethasone sodium phosphate injection - Injection  4 mg - 6/13/2024 9:27:00 AM Medications Comment:Block Injection:  LA dose divided between Right and Left block Post Assessment Injection Assessment: negative aspiration for heme, incremental injection and no  "paresthesia on injection Patient Tolerance:comfortable throughout block Complications:no Additional Notes Subcostal TAPs A high-frequency linear transducer, with sterile cover, was placed sub-xiphoid to identify [place], right and left Rectus Abdominus Muscles (RK). The transducer was moved either right or left subcostally to identify the RK and the Transverse Abdominus Muscle (BRAR). The insertion site was prepped in sterile fashion and then localized with 2-5 ml of 1% Lidocaine. Using ultrasound-guidance, a 20-gauge B-Monge 4\" Ultraplex 360 non-stimulating echogenic needle was advanced in plane, from medial to lateral, until the tip of the needle was in the fascial plane between the RK and BRAR. 1-3ml of preservative free normal saline was used to hydro-dissect the fascial planes. After the fascial plane was verified, the local anesthetic (LA) was injected. The procedure was repeated on the opposite side for bilateral coverage. Aspiration every 5 ml to prevent intravascular injection. Injection was completed with negative aspiration of blood and negative intravascular injection. Injection pressures were normal with minimal resistance. The subcostal approach to the TAP nerve block ideally anesthetizes the intercostal nerves T6-T9. Mid-Axillary/Lateral TAPs A high-frequency linear transducer, with sterile cover, was placed in the midaxillary line between the ASIS and costal margin. The External Oblique Muscle (EOM), Internal Oblique Muscle (IOM), Transverse Abdominus Muscle (BRAR), and Peritoneum were identified. The insertion site was prepped in sterile fashion and then localized with 2-5 ml of 1% Lidocaine. Using ultrasound-guidance, a 20-gauge B-Monge 4\" Ultraplex 360 non-stimulating echogenic needle was advanced in plane, from medial to lateral, until the tip of the needle was in the fascial plane between the IOM and BRAR. 1-3ml of preservative free normal saline was used to hydro-dissect the fascial planes. " After the fascial plane was verified, the local anesthetic (LA) was injected. The procedure was repeated on the opposite side for bilateral coverage. Aspiration every 5 ml to prevent intravascular injection. Injection was completed with negative aspiration of blood and negative intravascular injection. Injection pressures were normal with minimal resistance. Midaxillary TAPs should reach intercostal nerves T10- T11 and the subcostal nerve T12.  Performed by: Donavon Moreno CRNA     Results for orders placed during the hospital encounter of 05/06/24    Adult Transthoracic Echo Complete W/ Cont if Necessary Per Protocol    Interpretation Summary    Left ventricular systolic function is normal. Calculated left ventricular EF of 51%    Left ventricular diastolic function was normal.    Normal right ventricular systolic function    No significant valvular abnormality    No pericardial effusion    No prior echocardiogram for comparison      Assessment & Plan   Assessment & Plan     Active Hospital Problems    Diagnosis  POA    **S/P colostomy takedown [Z98.890]  Not Applicable      Resolved Hospital Problems   No resolved problems to display.     Lobo Yu is a 58 y.o. male with PMH of GERD, Diverticulitis with colostomy, HTN, kidney stones. Patient was admitted for planned colostomy take down by Dr. Botello on 6/13/24. We were asked to see the patient post op for medical management    Colostomy takedown  -- colostomy takedown with cystoscopy and temp placement of jean-pierre ureteral catheter on 6/13  -- care per surgeon   -- pain control  -- ambulate   -- reg diet   -- duran cath     HTN  -- cont home meds            Thank you for allowing [facility] Medicine Service to provide consultative care for your patient, we will continue to follow while clinically appropriate.    Donya Douglass, FARA  06/13/24

## 2024-06-13 NOTE — ANESTHESIA POSTPROCEDURE EVALUATION
Patient: Lobo Yu    Procedure Summary       Date: 06/13/24 Room / Location:  [facility] /  [facility]    Anesthesia Start: 0920 Anesthesia Stop: 1222    Procedures:       OPEN COLOSTOMY TAKEDOWN (Abdomen)      CYSTOSCOPY TEMPORARY PLACEMENT BILATERAL URETERAL CATHETER (Bilateral: Bladder) Diagnosis:     Surgeons: Abad Botello MD; Mitchell Hoffmann MD Provider: Dnoavon Major MD    Anesthesia Type: general with block ASA Status: 3            Anesthesia Type: general with block    Vitals  Vitals Value Taken Time   BP     Temp     Pulse 65 06/13/24 1222   Resp     SpO2 97 % 06/13/24 1222   Vitals shown include unfiled device data.        Post Anesthesia Care and Evaluation    Patient location during evaluation: PACU  Patient participation: complete - patient participated  Level of consciousness: awake and alert  Pain management: adequate    Airway patency: patent  Anesthetic complications: No anesthetic complications  PONV Status: none  Cardiovascular status: hemodynamically stable and acceptable  Respiratory status: nonlabored ventilation, acceptable and nasal cannula  Hydration status: acceptable

## 2024-06-13 NOTE — OP NOTE
Cystourethroscopy & Intraoperative Bilateral Catheter Placement Operative Report    Patient Name:  Lobo Yu  YOB: 1965    Date of Surgery:  6/13/2024     Indications: 58-year-old male with history of perforated diverticulitis, presents today for open colostomy takedown.  Prior history includes sigmoid colectomy.  Urology consulted intraoperatively for ureteral identification catheter placement.    Pre-op Diagnosis:   Diverticulitis       Post-Op Diagnosis Codes:  Diverticulitis    Procedure/CPT® Codes: 77344, 71214    1. CYSTOSCOPY BILATERAL URETERAL CATHETER/STENT INSERTION  2. MODIFIER 50, BILATERAL STENT PLACEMENT  3. BRANDT CATHETER INSERTION  CHANGE    Staff:  Surgeon(s):  Mitchell Hoffmann MD      Anesthesia: General with Block    Estimated Blood Loss: none    Implants:    Nothing was implanted during the procedure    Specimen:          None        Findings:   Normal pendulous, bulbar, prostatic urethra.  Normal urinary bladder.  Insertion of bilateral 5 Turkish ureteral identification catheter.  Brandt catheter insertion    Complications: None immediate    Description of Procedure:   The patient was identified in the preoperative holding area where informed consent was reviewed and signed. The patient was transported to the operating room per anesthesia and placed supine on the operating table. Smooth endotracheal intubation was performed without issue after administration of general anesthesia. The patient was then placed in the dorsal lithotomy position where genitals were prepped and draped in the usual sterile fashion. A brief timeout was performed identifying the correct patient procedure and laterality. Perioperative antibiotics were administered. All pressure points were padded.      Procedure began by inserting a 22 Turkish cystoscope atraumatically per the patient's urethra. Upon entering the bladder formal cystoscopy was performed identifying bilateral orthotopic ureteral  orifices and no evidence of tumor, stone, foreign body. Attention was then turned to the right ureteral orifice. A 5 Albanian open ended ureteral catheter was inserted into the distal ureter and passed up to the level right renal collecting system without difficulty.  Attention was then turned to the left ureteral orifice.  A 5 Albanian open ended ureteral catheter was inserted into the distal ureter and passed up to the level left renal collecting system without difficulty.        A 16F catheter was placed.  10 mL placed in the balloon.  The ureteral catheters were affixed to the Pitt catheter.     Patient remained under general anesthesia.  The patient will continue scheduled procedure with colorectal service, Dr. Rosmery MD. Urology service available to assist in any way during the procedure.    Mitchell Hoffmann MD     Date: 6/13/2024  Time: 10:11 EDT

## 2024-06-13 NOTE — OP NOTE
COLOSTOMY TAKEDOWN OR CLOSURE  Procedure Report    Patient Name:  Lobo Yu  YOB: 1965    Date of Surgery:  6/13/2024       Pre-op Diagnosis:   Presence of descending colostomy  Post-op Diagnosis:     Presence of descending colostomy     Procedure: Takedown of descending colostomy, Dr. Botello  Cystoscopy with placement of ureteral catheters, Dr. Hoffmann    Staff:  Surgeon(s):  Abad Botello MD Stark, Timothy, MD    Assistant: Libby Brown RNFA    Assistant: Libby Brown RNFA  was responsible for performing the following activities: Retraction, Suction, Irrigation, Suturing, Closing, and Placing Dressing and their skilled assistance was necessary for the success of this case.    Anesthesia: General with Block    Estimated Blood Loss: 300 mL    Specimen:    Specimens       ID Source Type Tests Collected By Collected At Frozen?    A Large Intestine, Sigmoid Colon Tissue TISSUE PATHOLOGY EXAM   Abad Botello MD 6/13/24 1040 No    Description: SIGMOID COLON, STOMA, AND DONUTS - FOR PERMANENT    This specimen was not marked as sent.              Findings: The patient's liver was smooth without focal or diffuse defect palpated.  There were numerous adhesions between the small bowel and small bowel and omentum and small bowel and omentum and abdominal wall.  The divided rectosigmoid was identified with Prolene suture.  There was no evidence of purulence or retained abscess.          Complications: None          Description of Procedure:   After the patient was properly identified he was brought to the operating room and placed in the comfortable supine position.  Once general anesthesia had been obtained a tap block was performed the patient was placed in lithotomy position.  Timeout was performed.  Dr. Hoffmann placed ureteral catheters which will be dictated separately by him.  Following this patient's abdomen and perineum were prepped and sterilely draped.  Healed midline incision  was entered using a scalpel and divided using electrocautery through all layers of the midline abdominal wall from the umbilicus to the pubis.  The abdomen was explored with the findings noted above.  The adhesions were taken down.  A 5 mm serosal tear was noted in the ileum which was repaired with 2 interrupted 3-cystoscopy junction.  Vicryl.  The rectosigmoid was identified and the retrorectal space was entered.  The peritoneum on either side was divided and joined below the bladder.  This allowed the rectosigmoid to be mobilized.  A point was identified the upper rectum where the tinea deck he stated.  The mesentery was divided at this point and ligated with 0 Vicryl.  The bowel was divided using the 45 mm thick tissue TX stapler.  The specimen was sent to pathology.  The stoma was then taken down use electrocautery and sharp dissection.  The mucocutaneous edge was divided with elliptical incision using a scalpel and then the dissection was continued with electrocautery.  Once the descending colon been fully mobilized it was brought into the abdominal cavity.  A point below the mucocutaneous junction with the bowel appeared healthy and pliable was selected for the proximal margin of resection.  The mesentery was divided tween clamps and ligated with 0 Vicryl at this point.  The bowel was sharply divided and the stoma was sent to pathology.  A 29 mm circular Ethicon stapler was used for the anastomosis.  The anvil was placed in the proximal bowel and secured with a 2-0 Prolene pursestring.  This was secured with a 0 Vicryl.  The anvil was advanced down to the pelvis and noted to reach the rectal stump without tension.  The rectal stump was i examined and a small vessel was noted to be bleeding at the staple line.  This was secured with a 3-0 Vicryl figure-of-eight suture.  Following this good hemostasis was noted.  The small and medium sizers were brought through the anal canal and advanced to the apex of the  "rectal stump.  The stapling device was then brought to the apex of the rectal stump and the trocar was brought out just above the staple line.  The 2 ends of the instrument were brought together and tightened to the appropriate tension.  Care was taken to maintain proper orientation.  The instrument was fired and removed.  Two good \"donuts\" were obtained.  The patient was proctoscope while the pelvis was filled with saline and the bowel above the anastomosis was occluded.  The anastomosis was patent with good blood supply and hemostatic.  It was located 15 cm.  It was noted to be without air leak.  The proctoscope was removed.  The surgeon changed gown and gloves.  The abdomen was irrigated and good hemostasis was noted.  The small bowel was run and no focal diffuse abnormalities were noted.  There were several violin string type adhesions that were taken down.  The omentum was draped over the small bowel.  The fascial defect at the stoma site was closed with running #1 PDS.  The fascia of the midline incision was closed using running #1 PDS.  The subcutaneous tissue was irrigated.  The subcutaneous tissue of the stoma was approximated with 3-0 Vicryl.  The skin was closed using staples.  Telfa jenifer were placed between the staples.  The patient tolerated the procedure well.  The sponge and needle count was correct.  He was taken the recovery room in good condition.   Abad Botello MD     Date: 6/13/2024  Time: 12:11 EDT    "

## 2024-06-14 LAB
ANION GAP SERPL CALCULATED.3IONS-SCNC: 11 MMOL/L (ref 5–15)
BASOPHILS # BLD AUTO: 0.01 10*3/MM3 (ref 0–0.2)
BASOPHILS NFR BLD AUTO: 0.1 % (ref 0–1.5)
BUN SERPL-MCNC: 17 MG/DL (ref 6–20)
BUN/CREAT SERPL: 15.9 (ref 7–25)
CALCIUM SPEC-SCNC: 9.4 MG/DL (ref 8.6–10.5)
CHLORIDE SERPL-SCNC: 102 MMOL/L (ref 98–107)
CO2 SERPL-SCNC: 26 MMOL/L (ref 22–29)
CREAT SERPL-MCNC: 1.07 MG/DL (ref 0.76–1.27)
CYTO UR: NORMAL
DEPRECATED RDW RBC AUTO: 43.4 FL (ref 37–54)
EGFRCR SERPLBLD CKD-EPI 2021: 80.9 ML/MIN/1.73
EOSINOPHIL # BLD AUTO: 0 10*3/MM3 (ref 0–0.4)
EOSINOPHIL NFR BLD AUTO: 0 % (ref 0.3–6.2)
ERYTHROCYTE [DISTWIDTH] IN BLOOD BY AUTOMATED COUNT: 12.7 % (ref 12.3–15.4)
GLUCOSE SERPL-MCNC: 173 MG/DL (ref 65–99)
HCT VFR BLD AUTO: 38.2 % (ref 37.5–51)
HGB BLD-MCNC: 13.1 G/DL (ref 13–17.7)
IMM GRANULOCYTES # BLD AUTO: 0.06 10*3/MM3 (ref 0–0.05)
IMM GRANULOCYTES NFR BLD AUTO: 0.4 % (ref 0–0.5)
LAB AP CASE REPORT: NORMAL
LAB AP CLINICAL INFORMATION: NORMAL
LYMPHOCYTES # BLD AUTO: 0.92 10*3/MM3 (ref 0.7–3.1)
LYMPHOCYTES NFR BLD AUTO: 5.5 % (ref 19.6–45.3)
MCH RBC QN AUTO: 31.9 PG (ref 26.6–33)
MCHC RBC AUTO-ENTMCNC: 34.3 G/DL (ref 31.5–35.7)
MCV RBC AUTO: 92.9 FL (ref 79–97)
MONOCYTES # BLD AUTO: 1.71 10*3/MM3 (ref 0.1–0.9)
MONOCYTES NFR BLD AUTO: 10.3 % (ref 5–12)
NEUTROPHILS NFR BLD AUTO: 13.92 10*3/MM3 (ref 1.7–7)
NEUTROPHILS NFR BLD AUTO: 83.7 % (ref 42.7–76)
NRBC BLD AUTO-RTO: 0 /100 WBC (ref 0–0.2)
PATH REPORT.FINAL DX SPEC: NORMAL
PATH REPORT.GROSS SPEC: NORMAL
PLATELET # BLD AUTO: 234 10*3/MM3 (ref 140–450)
PMV BLD AUTO: 10 FL (ref 6–12)
POTASSIUM SERPL-SCNC: 4.4 MMOL/L (ref 3.5–5.2)
RBC # BLD AUTO: 4.11 10*6/MM3 (ref 4.14–5.8)
SODIUM SERPL-SCNC: 139 MMOL/L (ref 136–145)
WBC NRBC COR # BLD AUTO: 16.62 10*3/MM3 (ref 3.4–10.8)

## 2024-06-14 PROCEDURE — 25010000002 CLINDAMYCIN 900 MG/50ML SOLUTION: Performed by: COLON & RECTAL SURGERY

## 2024-06-14 PROCEDURE — 80048 BASIC METABOLIC PNL TOTAL CA: CPT | Performed by: COLON & RECTAL SURGERY

## 2024-06-14 PROCEDURE — 25010000002 HYDROMORPHONE PER 4 MG: Performed by: COLON & RECTAL SURGERY

## 2024-06-14 PROCEDURE — 25010000002 ONDANSETRON PER 1 MG: Performed by: COLON & RECTAL SURGERY

## 2024-06-14 PROCEDURE — 85025 COMPLETE CBC W/AUTO DIFF WBC: CPT | Performed by: COLON & RECTAL SURGERY

## 2024-06-14 PROCEDURE — 99232 SBSQ HOSP IP/OBS MODERATE 35: CPT | Performed by: HOSPITALIST

## 2024-06-14 PROCEDURE — 25010000002 AZTREONAM PER 500 MG: Performed by: COLON & RECTAL SURGERY

## 2024-06-14 PROCEDURE — 25010000002 KETOROLAC TROMETHAMINE PER 15 MG: Performed by: COLON & RECTAL SURGERY

## 2024-06-14 RX ORDER — SIMETHICONE 80 MG
80 TABLET,CHEWABLE ORAL 4 TIMES DAILY PRN
Status: DISCONTINUED | OUTPATIENT
Start: 2024-06-14 | End: 2024-06-16 | Stop reason: HOSPADM

## 2024-06-14 RX ADMIN — TRIAMTERENE AND HYDROCHLOROTHIAZIDE 1 TABLET: 37.5; 25 TABLET ORAL at 09:17

## 2024-06-14 RX ADMIN — KETOROLAC TROMETHAMINE 30 MG: 30 INJECTION, SOLUTION INTRAMUSCULAR; INTRAVENOUS at 10:03

## 2024-06-14 RX ADMIN — CLINDAMYCIN IN 5 PERCENT DEXTROSE 900 MG: 18 INJECTION, SOLUTION INTRAVENOUS at 09:17

## 2024-06-14 RX ADMIN — ALVIMOPAN 12 MG: 12 CAPSULE ORAL at 09:13

## 2024-06-14 RX ADMIN — FAMOTIDINE 40 MG: 20 TABLET, FILM COATED ORAL at 09:13

## 2024-06-14 RX ADMIN — AZTREONAM 1000 MG: 1 INJECTION, POWDER, LYOPHILIZED, FOR SOLUTION INTRAMUSCULAR; INTRAVENOUS at 00:46

## 2024-06-14 RX ADMIN — ATENOLOL 50 MG: 50 TABLET ORAL at 09:13

## 2024-06-14 RX ADMIN — HYDROMORPHONE HYDROCHLORIDE 0.5 MG: 1 INJECTION, SOLUTION INTRAMUSCULAR; INTRAVENOUS; SUBCUTANEOUS at 20:11

## 2024-06-14 RX ADMIN — HYDROCODONE BITARTRATE AND ACETAMINOPHEN 1 TABLET: 5; 325 TABLET ORAL at 12:00

## 2024-06-14 RX ADMIN — HYDROMORPHONE HYDROCHLORIDE 0.5 MG: 1 INJECTION, SOLUTION INTRAMUSCULAR; INTRAVENOUS; SUBCUTANEOUS at 16:00

## 2024-06-14 RX ADMIN — CLINDAMYCIN IN 5 PERCENT DEXTROSE 900 MG: 18 INJECTION, SOLUTION INTRAVENOUS at 00:47

## 2024-06-14 RX ADMIN — POTASSIUM CHLORIDE, DEXTROSE MONOHYDRATE AND SODIUM CHLORIDE 100 ML/HR: 150; 5; 450 INJECTION, SOLUTION INTRAVENOUS at 06:09

## 2024-06-14 RX ADMIN — HYDROMORPHONE HYDROCHLORIDE 0.5 MG: 1 INJECTION, SOLUTION INTRAMUSCULAR; INTRAVENOUS; SUBCUTANEOUS at 01:15

## 2024-06-14 RX ADMIN — HYDROMORPHONE HYDROCHLORIDE 0.5 MG: 1 INJECTION, SOLUTION INTRAMUSCULAR; INTRAVENOUS; SUBCUTANEOUS at 06:12

## 2024-06-14 RX ADMIN — ONDANSETRON 4 MG: 2 INJECTION INTRAMUSCULAR; INTRAVENOUS at 01:18

## 2024-06-14 RX ADMIN — AZTREONAM 1000 MG: 1 INJECTION, POWDER, LYOPHILIZED, FOR SOLUTION INTRAMUSCULAR; INTRAVENOUS at 09:13

## 2024-06-14 RX ADMIN — HYDROCODONE BITARTRATE AND ACETAMINOPHEN 1 TABLET: 5; 325 TABLET ORAL at 06:45

## 2024-06-14 NOTE — CASE MANAGEMENT/SOCIAL WORK
Discharge Planning Assessment  [facility]     Patient Name: Lobo Yu  MRN: [id number]  Today's Date: 6/14/2024    Admit Date: 6/13/2024    Plan: Home with wife   Discharge Needs Assessment       Row Name 06/14/24 1135       Living Environment    People in Home spouse    Current Living Arrangements home    Potentially Unsafe Housing Conditions none    Primary Care Provided by self    Provides Primary Care For no one    Family Caregiver if Needed spouse;child(misa), adult    Quality of Family Relationships helpful;involved;supportive    Able to Return to Prior Arrangements yes       Resource/Environmental Concerns    Resource/Environmental Concerns none    Transportation Concerns none       Transition Planning    Patient/Family Anticipates Transition to home with family    Patient/Family Anticipated Services at Transition ;none    Transportation Anticipated family or friend will provide       Discharge Needs Assessment    Readmission Within the Last 30 Days no previous admission in last 30 days    Equipment Currently Used at Home none    Concerns to be Addressed denies needs/concerns at this time    Anticipated Changes Related to Illness none    Equipment Needed After Discharge none                   Discharge Plan       Row Name 06/14/24 1136       Plan    Plan Home with wife    Patient/Family in Agreement with Plan yes    Plan Comments Spoke to patient at bedside to initiate discharge planning. Patient lives at home with his wife in [facility]. Prior to admission, he was independent with ADL's. He has a CPAP, but not using. No home oxygen. He is not current with home health or outpatient therapy services. His PCP is Alissa Tony. He has [facility] PPO with drug coverage. His plan is home with wife to transport. CM will continue to follow.    Final Discharge Disposition Code 01 - home or self-care                  Continued Care and Services - Admitted Since 6/13/2024    No active  Spoke with pharmacy rep who states the insurance will fully cover the name brand Apriso. She has ordered this and it will be in Monday or Tuesday. Pts wife Janet is aware    coordination exists for this encounter.       Expected Discharge Date and Time       Expected Discharge Date Expected Discharge Time    Jun 16, 2024            Demographic Summary       Row Name 06/14/24 1135       General Information    Admission Type inpatient    Arrived From emergency department    Referral Source admission list    Reason for Consult discharge planning    Preferred Language English                   Functional Status       Row Name 06/14/24 1135       Functional Status    Usual Activity Tolerance excellent    Current Activity Tolerance excellent       Functional Status, IADL    Medications independent    Meal Preparation independent    Housekeeping independent    Laundry independent    Shopping independent                   Psychosocial    No documentation.                  Abuse/Neglect    No documentation.                  Legal    No documentation.                  Substance Abuse    No documentation.                  Patient Forms    No documentation.                     Edith Sutherland RN

## 2024-06-14 NOTE — PLAN OF CARE
Problem: Pain Acute  Goal: Acceptable Pain Control and Functional Ability  Outcome: Ongoing, Progressing  Intervention: Prevent or Manage Pain  Description: Evaluate pain level, effect of treatment and patient response at regular intervals.  Minimize painful stimuli; coordinate care and adjust environment (e.g., light, noise, unnecessary movement); promote sleep/rest.  Match pharmacologic analgesia to severity and type of pain mechanism (e.g., neuropathic, muscle, inflammatory); consider multimodal approach (e.g., nonopioid, opioid, adjuvant).  Provide medication at regular intervals; titrate to patient response; premedicate for painful procedures.  Manage breakthrough pain with additional doses; consider rotation or switching medication.  Monitor for signs of substance tolerance (increased dose to reach desired effect, decreased effect with same dose).  Manage medication-induced effects, such as constipation, nausea, pruritus, urinary retention, somnolence and dizziness.  Provide multimodal interventions, such as as physical activity, therapeutic exercise, yoga, TENS (transcutaneous electrical nerve stimulation) and manual therapy.  Train in functional activity modifications, such as body mechanics, posture, ergonomics, energy conservation and activity pacing.  Consider addition of complementary or alternative therapy, such as acupuncture, hypnosis or therapeutic touch.  Flowsheets (Taken 6/14/2024 1257)  Sensory Stimulation Regulation:   quiet environment promoted   television on  Sleep/Rest Enhancement: awakenings minimized  Medication Review/Management: medications reviewed  Intervention: Develop Pain Management Plan  Description: Acknowledge patient as the expert in pain self-management.  Use a consistent, validated tool for pain assessment; include function and quality of life.  Evaluate risk for opioid use and dependence.  Set pain management goals; determine acceptable level of discomfort to allow for  maximal functioning.  Determine [id number] pain management plan, including both pharmacologic and nonpharmacologic measures; integrate management of chronic (persistent) pain.  Identify and integrate past successful treatment measures, if able.  Encourage patient and caregiver involvement in pain assessment, interventions and safety measures.  Re-evaluate plan regularly.  Flowsheets  Taken 6/14/2024 1257  Pain Management Interventions: see MAR  Taken 6/14/2024 1200  Pain Management Interventions: see MAR     Problem: Adult Inpatient Plan of Care  Goal: Optimal Comfort and Wellbeing  Intervention: Monitor Pain and Promote Comfort  Description: Assess pain level, treatment efficacy and patient response at regular intervals using a consistent pain scale.  Consider the presence and impact of preexisting chronic pain.  Encourage patient and caregiver involvement in pain assessment, interventions and safety measures.  Recent Flowsheet Documentation  Taken 6/14/2024 1257 by Anand Mullins RN  Pain Management Interventions: see MAR  Taken 6/14/2024 1200 by Anand Mullins RN  Pain Management Interventions: see MAR   Goal Outcome Evaluation:

## 2024-06-14 NOTE — PROGRESS NOTES
"Jelaniramin Yu     LOS: 1 day     Subjective     Patient feels well with some incisional tenderness and tolerating diet but no stool or gas yet.  Passing urine.  2850 mL out yesterday.  Up and ambulating.  Afebrile.  Blood pressure 99/63.  Pulse 65.      Objective     Vital Signs  Vitals:    06/14/24 1128   BP: 99/63   Pulse: 65   Resp: 16   Temp: 98.3 °F (36.8 °C)   SpO2:        I/O  Intake & Output (last day)         06/13 0701 06/14 0700 06/14 0701  06/15 0700    P.O. 720     I.V. (mL/kg) 2856.7 (29.9)     IV Piggyback 400     Total Intake(mL/kg) 3976.7 (41.6)     Urine (mL/kg/hr) 2850 300 (0.6)    Blood 300     Total Output 3150 300    Net +826.7 -300                  Physical Exam:    Abdomen soft and minimally tender.  Dressing with some old blood.         Results Review:       WBC WBC   Date Value Ref Range Status   06/14/2024 16.62 (H) 3.40 - 10.80 10*3/mm3 Final      HGB Hemoglobin   Date Value Ref Range Status   06/14/2024 13.1 13.0 - 17.7 g/dL Final   06/13/2024 14.2 13.0 - 17.7 g/dL Final      HCT Hematocrit   Date Value Ref Range Status   06/14/2024 38.2 37.5 - 51.0 % Final   06/13/2024 41.6 37.5 - 51.0 % Final      PLT        Results from last 7 days   Lab Units 06/14/24  0332   PLATELETS 10*3/mm3 234            Sodium Sodium   Date Value Ref Range Status   06/14/2024 139 136 - 145 mmol/L Final      Potassium Potassium   Date Value Ref Range Status   06/14/2024 4.4 3.5 - 5.2 mmol/L Final   06/13/2024 3.9 3.5 - 5.2 mmol/L Final     Comment:     Slight hemolysis detected by analyzer. Result may be falsely elevated.      Chloride Chloride   Date Value Ref Range Status   06/14/2024 102 98 - 107 mmol/L Final      Bicarbonate No results found for: \"PLASMABICARB\"   BUN BUN   Date Value Ref Range Status   06/14/2024 17 6 - 20 mg/dL Final      Creatinine Creatinine   Date Value Ref Range Status   06/14/2024 1.07 0.76 - 1.27 mg/dL Final      Calcium Calcium   Date Value Ref Range Status   06/14/2024 9.4 " "8.6 - 10.5 mg/dL Final      Magnesium No results found for: \"MG\"         Imaging Results (Last 24 Hours)       ** No results found for the last 24 hours. **            Assessment & Plan       S/P colostomy takedown      Patient postop day 1 status post takedown of colostomy.  Tolerating diet and ambulating, but no bowel activity yet.  Will DC IV.  Continue ambulation.  Await return of bowel function.  Will need jenifer removed tomorrow.    Abad Botello MD  06/14/24  12:19 EDT        "

## 2024-06-14 NOTE — PLAN OF CARE
Goal Outcome Evaluation:       Problem: Adult Inpatient Plan of Care  Goal: Plan of Care Review  Outcome: Ongoing, Progressing  Goal: Patient-Specific Goal (Individualized)  Outcome: Ongoing, Progressing  Goal: Absence of Hospital-Acquired Illness or Injury  Outcome: Ongoing, Progressing  Intervention: Identify and Manage Fall Risk  Recent Flowsheet Documentation  Taken 6/13/2024 2347 by Loyda Almeida RN  Safety Promotion/Fall Prevention:   gait belt   activity supervised   clutter free environment maintained   nonskid shoes/slippers when out of bed   room organization consistent   assistive device/personal items within reach   fall prevention program maintained   lighting adjusted   safety round/check completed  Taken 6/13/2024 2035 by Loyda Almeida RN  Safety Promotion/Fall Prevention:   gait belt   activity supervised   clutter free environment maintained   nonskid shoes/slippers when out of bed   room organization consistent   assistive device/personal items within reach   fall prevention program maintained   lighting adjusted   safety round/check completed  Intervention: Prevent Skin Injury  Recent Flowsheet Documentation  Taken 6/13/2024 2347 by Loyda Almeida RN  Body Position: position changed independently  Taken 6/13/2024 2035 by Loyda Almeida RN  Body Position: position changed independently  Intervention: Prevent and Manage VTE (Venous Thromboembolism) Risk  Recent Flowsheet Documentation  Taken 6/13/2024 2347 by Loyda Almeida RN  Activity Management: activity encouraged  Taken 6/13/2024 2035 by Loyda Almeida RN  Activity Management: activity encouraged  VTE Prevention/Management: sequential compression devices off  Intervention: Prevent Infection  Recent Flowsheet Documentation  Taken 6/13/2024 2347 by Loyda Almeida RN  Infection Prevention:   environmental surveillance performed   equipment surfaces disinfected   hand hygiene promoted   rest/sleep promoted   single patient room  provided  Taken 6/13/2024 2035 by Loyda Almeida RN  Infection Prevention:   environmental surveillance performed   equipment surfaces disinfected   hand hygiene promoted   rest/sleep promoted   single patient room provided  Goal: Optimal Comfort and Wellbeing  Outcome: Ongoing, Progressing  Intervention: Monitor Pain and Promote Comfort  Recent Flowsheet Documentation  Taken 6/14/2024 0115 by Loyda Almeida RN  Pain Management Interventions: see MAR  Taken 6/13/2024 2035 by Loyda Almeida RN  Pain Management Interventions: no interventions per patient request  Intervention: Provide Person-Centered Care  Recent Flowsheet Documentation  Taken 6/13/2024 2035 by Loyda Almeida RN  Trust Relationship/Rapport:   care explained   questions answered   questions encouraged  Goal: Readiness for Transition of Care  Outcome: Ongoing, Progressing     Problem: Pain Acute  Goal: Acceptable Pain Control and Functional Ability  Outcome: Ongoing, Progressing  Intervention: Prevent or Manage Pain  Recent Flowsheet Documentation  Taken 6/13/2024 2347 by Loyda Almeida RN  Sleep/Rest Enhancement: noise level reduced  Taken 6/13/2024 2035 by Loyda Almeida RN  Sensory Stimulation Regulation: care clustered  Sleep/Rest Enhancement: noise level reduced  Medication Review/Management: medications reviewed  Intervention: Develop Pain Management Plan  Recent Flowsheet Documentation  Taken 6/14/2024 0115 by Loyda Almeida RN  Pain Management Interventions: see MAR  Taken 6/13/2024 2035 by Loyda Almeida RN  Pain Management Interventions: no interventions per patient request  Intervention: Optimize Psychosocial Wellbeing  Recent Flowsheet Documentation  Taken 6/13/2024 2035 by Loyda Almeida RN  Supportive Measures:   goal-setting facilitated   self-care encouraged  Diversional Activities:   television   smartphone

## 2024-06-14 NOTE — PROGRESS NOTES
[facility] Medicine Services  PROGRESS NOTE    Patient Name: Lobo Yu  : 1965  MRN: [id number]    Date of Admission: 2024  Primary Care Physician: Alissa Tony DO    Subjective   Subjective     CC:  F/U s/p colostomy takedown    HPI:  Seen this morning. Having some gas pains. Pitt removed and he just voided on his own.       Objective   Objective     Vital Signs:   Temp:  [97 °F (36.1 °C)-98.3 °F (36.8 °C)] 98.3 °F (36.8 °C)  Heart Rate:  [57-88] 65  Resp:  [16-20] 16  BP: ()/(53-94) 99/63  Flow (L/min):  [2] 2     Physical Exam:  Gen-no acute distress  HENT-NCAT, mucous membranes moist  CV-RRR, S1 S2 normal, no m/r/g  Resp-CTAB, no wheezes or rales  Abd-soft, mild TTP, ND, +BS  Ext-no edema  Neuro-A&Ox3, no focal deficits  Skin-no rashes  Psych-appropriate mood      Results Reviewed:  LAB RESULTS:      Lab 24  0332 24  1336   WBC 16.62*  --    HEMOGLOBIN 13.1 14.2   HEMATOCRIT 38.2 41.6   PLATELETS 234  --    NEUTROS ABS 13.92*  --    IMMATURE GRANS (ABS) 0.06*  --    LYMPHS ABS 0.92  --    MONOS ABS 1.71*  --    EOS ABS 0.00  --    MCV 92.9  --          Lab 24  0332 24  0816   SODIUM 139  --    POTASSIUM 4.4 3.9   CHLORIDE 102  --    CO2 26.0  --    ANION GAP 11.0  --    BUN 17  --    CREATININE 1.07  --    EGFR 80.9  --    GLUCOSE 173*  --    CALCIUM 9.4  --                      Lab 24  0802   ABO TYPING O   RH TYPING Negative   ANTIBODY SCREEN Negative         Brief Urine Lab Results  (Last result in the past 365 days)        Color   Clarity   Blood   Leuk Est   Nitrite   Protein   CREAT   Urine HCG        23 1147 Yellow   Clear   Negative   Small (1+)   Negative   Negative                   Microbiology Results Abnormal       None            Peripheral Block    Result Date: 2024  Donavon Moreno CRNA     2024  9:43 AM Peripheral Block Patient reassessed immediately prior to procedure Patient location  "during procedure: OR Start time: 6/13/2024 9:27 AM Reason for block: at surgeon's request and post-op pain management Performed by CRNA/CAA: Telly Bailey, CRNA Preanesthetic Checklist Completed: patient identified, IV checked, site marked, risks and benefits discussed, surgical consent, monitors and equipment checked, pre-op evaluation and timeout performed Prep: Pt Position: supine Sterile barriers:cap, gloves, mask and washed/disinfected hands Prep: ChloraPrep Patient monitoring: blood pressure monitoring, continuous pulse oximetry and EKG Procedure Sedation: yes Performed under: general Guidance:ultrasound guided Images:still images obtained, printed/placed on chart Laterality:Bilateral Block Type:TAP Injection Technique:single-shot Needle Type:short-bevel and echogenic Needle Gauge:20 G Resistance on Injection: none Medications Used: bupivacaine PF (MARCAINE) 0.25 % injection - Injection  60 mL - 6/13/2024 9:27:00 AM dexamethasone sodium phosphate injection - Injection  4 mg - 6/13/2024 9:27:00 AM Medications Comment:Block Injection:  LA dose divided between Right and Left block Post Assessment Injection Assessment: negative aspiration for heme, incremental injection and no paresthesia on injection Patient Tolerance:comfortable throughout block Complications:no Additional Notes Subcostal TAPs A high-frequency linear transducer, with sterile cover, was placed sub-xiphoid to identify [place], right and left Rectus Abdominus Muscles (RK). The transducer was moved either right or left subcostally to identify the RK and the Transverse Abdominus Muscle (BRAR). The insertion site was prepped in sterile fashion and then localized with 2-5 ml of 1% Lidocaine. Using ultrasound-guidance, a 20-gauge B-Monge 4\" Ultraplex 360 non-stimulating echogenic needle was advanced in plane, from medial to lateral, until the tip of the needle was in the fascial plane between the RK and BRAR. 1-3ml of preservative free normal " "saline was used to hydro-dissect the fascial planes. After the fascial plane was verified, the local anesthetic (LA) was injected. The procedure was repeated on the opposite side for bilateral coverage. Aspiration every 5 ml to prevent intravascular injection. Injection was completed with negative aspiration of blood and negative intravascular injection. Injection pressures were normal with minimal resistance. The subcostal approach to the TAP nerve block ideally anesthetizes the intercostal nerves T6-T9. Mid-Axillary/Lateral TAPs A high-frequency linear transducer, with sterile cover, was placed in the midaxillary line between the ASIS and costal margin. The External Oblique Muscle (EOM), Internal Oblique Muscle (IOM), Transverse Abdominus Muscle (BRAR), and Peritoneum were identified. The insertion site was prepped in sterile fashion and then localized with 2-5 ml of 1% Lidocaine. Using ultrasound-guidance, a 20-gauge B-Monge 4\" Ultraplex 360 non-stimulating echogenic needle was advanced in plane, from medial to lateral, until the tip of the needle was in the fascial plane between the IOM and BRAR. 1-3ml of preservative free normal saline was used to hydro-dissect the fascial planes. After the fascial plane was verified, the local anesthetic (LA) was injected. The procedure was repeated on the opposite side for bilateral coverage. Aspiration every 5 ml to prevent intravascular injection. Injection was completed with negative aspiration of blood and negative intravascular injection. Injection pressures were normal with minimal resistance. Midaxillary TAPs should reach intercostal nerves T10- T11 and the subcostal nerve T12.  Performed by: Donavon Moreno CRNA     Results for orders placed during the hospital encounter of 05/06/24    Adult Transthoracic Echo Complete W/ Cont if Necessary Per Protocol    Interpretation Summary    Left ventricular systolic function is normal. Calculated left ventricular EF of 51%    Left " ventricular diastolic function was normal.    Normal right ventricular systolic function    No significant valvular abnormality    No pericardial effusion    No prior echocardiogram for comparison      Current medications:  Scheduled Meds:alvimopan, 12 mg, Oral, BID  atenolol, 50 mg, Oral, Daily  enoxaparin, 40 mg, Subcutaneous, Daily  famotidine, 40 mg, Oral, Daily  triamterene-hydrochlorothiazide, 1 tablet, Oral, Daily      Continuous Infusions:dextrose 5 % and sodium chloride 0.45 % with KCl 20 mEq/L, 100 mL/hr, Last Rate: 100 mL/hr (06/14/24 0609)  Scopolamine, 1 patch      PRN Meds:.  acetaminophen    fluticasone    HYDROcodone-acetaminophen    HYDROmorphone **AND** naloxone    ketorolac    ondansetron    Assessment & Plan   Assessment & Plan     Active Hospital Problems    Diagnosis  POA    **S/P colostomy takedown [Z98.890]  Not Applicable      Resolved Hospital Problems   No resolved problems to display.        Brief Hospital Course to date:  Lobo Yu is a 58 y.o. male with PMH of GERD, perforated diverticulitis s/p sigmoid colectomy with colostomy 11/20/2023, HTN, and kidney stones. Patient was admitted for planned colostomy take down by Dr. Botello on 6/13/24.      This patient's problems and plans were partially entered by my partner and updated as appropriate by me 06/14/24. Copied text in this note has been reviewed and is accurate as of today's date.     S/P colostomy takedown  Hx of perforated diverticulitis s/p sigmoid colectomy with colostomy 11/20/23  --s/p colostomy takedown (Dr. Botello) with cystoscopy and temporary placement of bilateral ureteral catheters (Dr. Hoffmann) on 6/13/24  --Pitt catheter removed, voiding spontaneously this morning  --Continue pain/nausea meds as needed  --Ambulate  --Await return of bowel function  --Further care per Dr. Botello     HTN  --BP well controlled  --Continue home meds (Atenolol, Dyazide)    GERD  --Continue PPI, Pepcid    Home when okay with Dr. Botello.      VTE Prophylaxis:  Pharmacologic & mechanical VTE prophylaxis orders are present.         AM-PAC 6 Clicks Score (PT): 24 (06/14/24 0800)    CODE STATUS:   Code Status and Medical Interventions:   Ordered at: 06/13/24 1445     Level Of Support Discussed With:    Patient     Code Status (Patient has no pulse and is not breathing):    CPR (Attempt to Resuscitate)     Medical Interventions (Patient has pulse or is breathing):    Full Support       Karolyn Tavera MD  06/14/24

## 2024-06-15 LAB
ANION GAP SERPL CALCULATED.3IONS-SCNC: 8 MMOL/L (ref 5–15)
BASOPHILS # BLD AUTO: 0.05 10*3/MM3 (ref 0–0.2)
BASOPHILS NFR BLD AUTO: 0.4 % (ref 0–1.5)
BUN SERPL-MCNC: 24 MG/DL (ref 6–20)
BUN/CREAT SERPL: 24 (ref 7–25)
CALCIUM SPEC-SCNC: 8.9 MG/DL (ref 8.6–10.5)
CHLORIDE SERPL-SCNC: 104 MMOL/L (ref 98–107)
CO2 SERPL-SCNC: 28 MMOL/L (ref 22–29)
CREAT SERPL-MCNC: 1 MG/DL (ref 0.76–1.27)
DEPRECATED RDW RBC AUTO: 45.3 FL (ref 37–54)
EGFRCR SERPLBLD CKD-EPI 2021: 87.8 ML/MIN/1.73
EOSINOPHIL # BLD AUTO: 0.04 10*3/MM3 (ref 0–0.4)
EOSINOPHIL NFR BLD AUTO: 0.3 % (ref 0.3–6.2)
ERYTHROCYTE [DISTWIDTH] IN BLOOD BY AUTOMATED COUNT: 13.2 % (ref 12.3–15.4)
GLUCOSE SERPL-MCNC: 91 MG/DL (ref 65–99)
HCT VFR BLD AUTO: 35.1 % (ref 37.5–51)
HGB BLD-MCNC: 11.8 G/DL (ref 13–17.7)
IMM GRANULOCYTES # BLD AUTO: 0.06 10*3/MM3 (ref 0–0.05)
IMM GRANULOCYTES NFR BLD AUTO: 0.4 % (ref 0–0.5)
LYMPHOCYTES # BLD AUTO: 2.66 10*3/MM3 (ref 0.7–3.1)
LYMPHOCYTES NFR BLD AUTO: 19.6 % (ref 19.6–45.3)
MCH RBC QN AUTO: 31.4 PG (ref 26.6–33)
MCHC RBC AUTO-ENTMCNC: 33.6 G/DL (ref 31.5–35.7)
MCV RBC AUTO: 93.4 FL (ref 79–97)
MONOCYTES # BLD AUTO: 1.53 10*3/MM3 (ref 0.1–0.9)
MONOCYTES NFR BLD AUTO: 11.3 % (ref 5–12)
NEUTROPHILS NFR BLD AUTO: 68 % (ref 42.7–76)
NEUTROPHILS NFR BLD AUTO: 9.22 10*3/MM3 (ref 1.7–7)
NRBC BLD AUTO-RTO: 0 /100 WBC (ref 0–0.2)
PLATELET # BLD AUTO: 209 10*3/MM3 (ref 140–450)
PMV BLD AUTO: 9.7 FL (ref 6–12)
POTASSIUM SERPL-SCNC: 3.9 MMOL/L (ref 3.5–5.2)
RBC # BLD AUTO: 3.76 10*6/MM3 (ref 4.14–5.8)
SODIUM SERPL-SCNC: 140 MMOL/L (ref 136–145)
WBC NRBC COR # BLD AUTO: 13.56 10*3/MM3 (ref 3.4–10.8)

## 2024-06-15 PROCEDURE — 85025 COMPLETE CBC W/AUTO DIFF WBC: CPT | Performed by: COLON & RECTAL SURGERY

## 2024-06-15 PROCEDURE — 80048 BASIC METABOLIC PNL TOTAL CA: CPT | Performed by: HOSPITALIST

## 2024-06-15 PROCEDURE — 25010000002 KETOROLAC TROMETHAMINE PER 15 MG: Performed by: COLON & RECTAL SURGERY

## 2024-06-15 PROCEDURE — 25010000002 ENOXAPARIN PER 10 MG: Performed by: COLON & RECTAL SURGERY

## 2024-06-15 PROCEDURE — 99232 SBSQ HOSP IP/OBS MODERATE 35: CPT | Performed by: HOSPITALIST

## 2024-06-15 PROCEDURE — 25010000002 HYDROMORPHONE PER 4 MG: Performed by: COLON & RECTAL SURGERY

## 2024-06-15 RX ORDER — HYDROCODONE BITARTRATE AND ACETAMINOPHEN 5; 325 MG/1; MG/1
1 TABLET ORAL EVERY 6 HOURS PRN
Qty: 20 TABLET | Refills: 0 | Status: SHIPPED | OUTPATIENT
Start: 2024-06-15 | End: 2024-06-24

## 2024-06-15 RX ORDER — SCOLOPAMINE TRANSDERMAL SYSTEM 1 MG/1
1 PATCH, EXTENDED RELEASE TRANSDERMAL CONTINUOUS
Status: DISCONTINUED | OUTPATIENT
Start: 2024-06-15 | End: 2024-06-16 | Stop reason: HOSPADM

## 2024-06-15 RX ADMIN — HYDROMORPHONE HYDROCHLORIDE 0.5 MG: 1 INJECTION, SOLUTION INTRAMUSCULAR; INTRAVENOUS; SUBCUTANEOUS at 03:45

## 2024-06-15 RX ADMIN — HYDROMORPHONE HYDROCHLORIDE 0.5 MG: 1 INJECTION, SOLUTION INTRAMUSCULAR; INTRAVENOUS; SUBCUTANEOUS at 00:19

## 2024-06-15 RX ADMIN — ENOXAPARIN SODIUM 40 MG: 100 INJECTION SUBCUTANEOUS at 08:06

## 2024-06-15 RX ADMIN — KETOROLAC TROMETHAMINE 30 MG: 30 INJECTION, SOLUTION INTRAMUSCULAR; INTRAVENOUS at 21:12

## 2024-06-15 RX ADMIN — FAMOTIDINE 40 MG: 20 TABLET, FILM COATED ORAL at 08:06

## 2024-06-15 RX ADMIN — ATENOLOL 50 MG: 50 TABLET ORAL at 08:06

## 2024-06-15 RX ADMIN — HYDROMORPHONE HYDROCHLORIDE 0.5 MG: 1 INJECTION, SOLUTION INTRAMUSCULAR; INTRAVENOUS; SUBCUTANEOUS at 09:10

## 2024-06-15 RX ADMIN — SCOPOLAMINE 1 PATCH: 1.5 PATCH, EXTENDED RELEASE TRANSDERMAL at 14:39

## 2024-06-15 RX ADMIN — TRIAMTERENE AND HYDROCHLOROTHIAZIDE 1 TABLET: 37.5; 25 TABLET ORAL at 08:06

## 2024-06-15 RX ADMIN — KETOROLAC TROMETHAMINE 30 MG: 30 INJECTION, SOLUTION INTRAMUSCULAR; INTRAVENOUS at 14:43

## 2024-06-15 RX ADMIN — HYDROCODONE BITARTRATE AND ACETAMINOPHEN 1 TABLET: 5; 325 TABLET ORAL at 08:06

## 2024-06-15 RX ADMIN — HYDROCODONE BITARTRATE AND ACETAMINOPHEN 1 TABLET: 5; 325 TABLET ORAL at 12:32

## 2024-06-15 NOTE — PLAN OF CARE
Problem: Adult Inpatient Plan of Care  Goal: Plan of Care Review  Outcome: Ongoing, Progressing  Goal: Patient-Specific Goal (Individualized)  Outcome: Ongoing, Progressing  Goal: Absence of Hospital-Acquired Illness or Injury  Outcome: Ongoing, Progressing  Intervention: Identify and Manage Fall Risk  Recent Flowsheet Documentation  Taken 6/15/2024 1226 by Osei Regan RN  Safety Promotion/Fall Prevention:   activity supervised   assistive device/personal items within reach   clutter free environment maintained   lighting adjusted   nonskid shoes/slippers when out of bed   safety round/check completed   room organization consistent  Taken 6/15/2024 1000 by Osei Regan RN  Safety Promotion/Fall Prevention:   activity supervised   assistive device/personal items within reach   clutter free environment maintained   lighting adjusted   nonskid shoes/slippers when out of bed   room organization consistent   safety round/check completed  Taken 6/15/2024 0900 by Osei Regan RN  Safety Promotion/Fall Prevention:   activity supervised   assistive device/personal items within reach   clutter free environment maintained   lighting adjusted   nonskid shoes/slippers when out of bed   room organization consistent   safety round/check completed  Intervention: Prevent Skin Injury  Recent Flowsheet Documentation  Taken 6/15/2024 1000 by Osei Regan RN  Skin Protection:   adhesive use limited   tubing/devices free from skin contact   skin-to-skin areas padded   transparent dressing maintained   incontinence pads utilized   skin-to-device areas padded  Taken 6/15/2024 0900 by Osei Regan RN  Body Position: position changed independently  Skin Protection:   adhesive use limited   tubing/devices free from skin contact   skin-to-skin areas padded   transparent dressing maintained   skin-to-device areas padded   incontinence pads utilized  Intervention: Prevent Infection  Recent Flowsheet Documentation  Taken 6/15/2024 1226 by  Jass, Osei, RN  Infection Prevention:   environmental surveillance performed   equipment surfaces disinfected   personal protective equipment utilized   hand hygiene promoted   rest/sleep promoted   single patient room provided  Taken 6/15/2024 1000 by Osei Regan RN  Infection Prevention:   environmental surveillance performed   equipment surfaces disinfected   hand hygiene promoted   personal protective equipment utilized   rest/sleep promoted   single patient room provided  Taken 6/15/2024 0900 by Osei Regan RN  Infection Prevention:   cohorting utilized   rest/sleep promoted  Goal: Optimal Comfort and Wellbeing  Outcome: Ongoing, Progressing  Intervention: Monitor Pain and Promote Comfort  Recent Flowsheet Documentation  Taken 6/15/2024 1226 by Osei Regan RN  Pain Management Interventions:   see MAR   quiet environment facilitated  Taken 6/15/2024 1000 by Osei Regan RN  Pain Management Interventions:   see MAR   quiet environment facilitated  Taken 6/15/2024 0900 by Osei Regan RN  Pain Management Interventions:   see MAR   quiet environment facilitated  Intervention: Provide Person-Centered Care  Recent Flowsheet Documentation  Taken 6/15/2024 1226 by Osei Regan RN  Trust Relationship/Rapport:   care explained   questions answered   questions encouraged  Taken 6/15/2024 1000 by Osei Regan RN  Trust Relationship/Rapport:   care explained   questions encouraged   questions answered  Taken 6/15/2024 0900 by Osei Regan RN  Trust Relationship/Rapport:   care explained   questions answered   questions encouraged  Goal: Readiness for Transition of Care  Outcome: Ongoing, Progressing     Problem: Pain Acute  Goal: Acceptable Pain Control and Functional Ability  Outcome: Ongoing, Progressing  Intervention: Prevent or Manage Pain  Recent Flowsheet Documentation  Taken 6/15/2024 1226 by Osei Regan RN  Medication Review/Management: medications reviewed  Taken 6/15/2024 1000 by Osei Regan RN  Medication  Review/Management: medications reviewed  Taken 6/15/2024 0900 by Osei Regan RN  Sensory Stimulation Regulation: care clustered  Medication Review/Management: medications reviewed  Intervention: Develop Pain Management Plan  Recent Flowsheet Documentation  Taken 6/15/2024 1226 by Osei Regan RN  Pain Management Interventions:   see MAR   quiet environment facilitated  Taken 6/15/2024 1000 by Osei Regan RN  Pain Management Interventions:   see MAR   quiet environment facilitated  Taken 6/15/2024 0900 by Osei Regan RN  Pain Management Interventions:   see MAR   quiet environment facilitated  Intervention: Optimize Psychosocial Wellbeing  Recent Flowsheet Documentation  Taken 6/15/2024 0900 by Osei Regan RN  Diversional Activities: television   Goal Outcome Evaluation:

## 2024-06-15 NOTE — PROGRESS NOTES
"Lobo Yu     LOS: 2 days     Subjective     Afebrile with stable blood pressure.  Urine output adequate.  Passing stool.  Hemoglobin 11.8.  White blood cell count 13.5.  Potassium 3.9.  Creatinine 1.0.    Objective     Vital Signs  Vitals:    06/15/24 0416   BP: 120/82   Pulse:    Resp: 18   Temp: 98.2 °F (36.8 °C)   SpO2: 96%       I/O  Intake & Output (last day)         06/14 0701  06/15 0700 06/15 0701 06/16 0700    P.O.      I.V. (mL/kg)      IV Piggyback      Total Intake(mL/kg)      Urine (mL/kg/hr) 1400 (0.6)     Stool 0     Blood      Total Output 1400     Net -1400           Stool Unmeasured Occurrence 2 x             Physical Exam:    Abdomen soft and nontender.  Wound healing well.  Palma removed.         Results Review:       WBC WBC   Date Value Ref Range Status   06/15/2024 13.56 (H) 3.40 - 10.80 10*3/mm3 Final   06/14/2024 16.62 (H) 3.40 - 10.80 10*3/mm3 Final      HGB Hemoglobin   Date Value Ref Range Status   06/15/2024 11.8 (L) 13.0 - 17.7 g/dL Final   06/14/2024 13.1 13.0 - 17.7 g/dL Final   06/13/2024 14.2 13.0 - 17.7 g/dL Final      HCT Hematocrit   Date Value Ref Range Status   06/15/2024 35.1 (L) 37.5 - 51.0 % Final   06/14/2024 38.2 37.5 - 51.0 % Final   06/13/2024 41.6 37.5 - 51.0 % Final      PLT        Results from last 7 days   Lab Units 06/15/24  0337   PLATELETS 10*3/mm3 209            Sodium Sodium   Date Value Ref Range Status   06/15/2024 140 136 - 145 mmol/L Final   06/14/2024 139 136 - 145 mmol/L Final      Potassium Potassium   Date Value Ref Range Status   06/15/2024 3.9 3.5 - 5.2 mmol/L Final   06/14/2024 4.4 3.5 - 5.2 mmol/L Final   06/13/2024 3.9 3.5 - 5.2 mmol/L Final     Comment:     Slight hemolysis detected by analyzer. Result may be falsely elevated.      Chloride Chloride   Date Value Ref Range Status   06/15/2024 104 98 - 107 mmol/L Final   06/14/2024 102 98 - 107 mmol/L Final      Bicarbonate No results found for: \"PLASMABICARB\"   BUN BUN   Date Value " Ref Range Status   06/15/2024 24 (H) 6 - 20 mg/dL Final   06/14/2024 17 6 - 20 mg/dL Final      Creatinine Creatinine   Date Value Ref Range Status   06/15/2024 1.00 0.76 - 1.27 mg/dL Final   06/14/2024 1.07 0.76 - 1.27 mg/dL Final      Calcium Calcium   Date Value Ref Range Status   06/15/2024 8.9 8.6 - 10.5 mg/dL Final   06/14/2024 9.4 8.6 - 10.5 mg/dL Final      Assessment & Plan       S/P colostomy takedown      Patient doing well postop day 2 status post takedown of colostomy.  He is tolerating his diet and up and ambulating.  He remains afebrile and his hemoglobin is stable.  Patient will likely be able to go home tomorrow from my standpoint.  Follow-up in the office a week from Wednesday for staple removal.  He is not lifting thing over 10 pounds for 8 weeks.  He is to have assistance if he showers or climb stairs.  He will be given a prescription for Norco 5 1 p.o. every 6 hours as needed pain #20.  He is not to drive if taking the medication.  He may resume a regular diet.  He is to call for pain, fever, or bleeding.      Abad Botello MD  06/15/24  15:50 EDT

## 2024-06-15 NOTE — PROGRESS NOTES
[facility] Medicine Services  PROGRESS NOTE    Patient Name: Lobo Yu  : 1965  MRN: [id number]    Date of Admission: 2024  Primary Care Physician: Alissa Tony DO    Subjective   Subjective     CC:  F/U s/p colostomy takedown    HPI:  Seen this morning. No significant bowel movement yet. Passing gas. Having some cramping.       Objective   Objective     Vital Signs:   Temp:  [97.6 °F (36.4 °C)-98.2 °F (36.8 °C)] 98.2 °F (36.8 °C)  Heart Rate:  [64-72] 72  Resp:  [16-18] 18  BP: ()/(68-82) 120/82     Physical Exam:  Gen-no acute distress  HENT-NCAT, mucous membranes moist  CV-RRR, S1 S2 normal, no m/r/g  Resp-CTAB, no wheezes or rales  Abd-soft, mild TTP, mild to moderate distension, hypoactive BS  Ext-no edema  Neuro-A&Ox3, no focal deficits  Skin-no rashes  Psych-appropriate mood      Results Reviewed:  LAB RESULTS:      Lab 06/15/24  0337 24  0332 24  1336   WBC 13.56* 16.62*  --    HEMOGLOBIN 11.8* 13.1 14.2   HEMATOCRIT 35.1* 38.2 41.6   PLATELETS 209 234  --    NEUTROS ABS 9.22* 13.92*  --    IMMATURE GRANS (ABS) 0.06* 0.06*  --    LYMPHS ABS 2.66 0.92  --    MONOS ABS 1.53* 1.71*  --    EOS ABS 0.04 0.00  --    MCV 93.4 92.9  --          Lab 06/15/24  0337 24  0332 24  0816   SODIUM 140 139  --    POTASSIUM 3.9 4.4 3.9   CHLORIDE 104 102  --    CO2 28.0 26.0  --    ANION GAP 8.0 11.0  --    BUN 24* 17  --    CREATININE 1.00 1.07  --    EGFR 87.8 80.9  --    GLUCOSE 91 173*  --    CALCIUM 8.9 9.4  --                      Lab 24  0802   ABO TYPING O   RH TYPING Negative   ANTIBODY SCREEN Negative         Brief Urine Lab Results  (Last result in the past 365 days)        Color   Clarity   Blood   Leuk Est   Nitrite   Protein   CREAT   Urine HCG        23 1147 Yellow   Clear   Negative   Small (1+)   Negative   Negative                   Microbiology Results Abnormal       None            No radiology results from the  last 24 hrs    Results for orders placed during the hospital encounter of 05/06/24    Adult Transthoracic Echo Complete W/ Cont if Necessary Per Protocol    Interpretation Summary    Left ventricular systolic function is normal. Calculated left ventricular EF of 51%    Left ventricular diastolic function was normal.    Normal right ventricular systolic function    No significant valvular abnormality    No pericardial effusion    No prior echocardiogram for comparison      Current medications:  Scheduled Meds:atenolol, 50 mg, Oral, Daily  enoxaparin, 40 mg, Subcutaneous, Daily  famotidine, 40 mg, Oral, Daily  triamterene-hydrochlorothiazide, 1 tablet, Oral, Daily      Continuous Infusions:Scopolamine, 1 patch      PRN Meds:.  acetaminophen    fluticasone    HYDROcodone-acetaminophen    HYDROmorphone **AND** naloxone    ketorolac    ondansetron    simethicone    Assessment & Plan   Assessment & Plan     Active Hospital Problems    Diagnosis  POA    **S/P colostomy takedown [Z98.890]  Not Applicable      Resolved Hospital Problems   No resolved problems to display.        Brief Hospital Course to date:  Lobo Yu is a 58 y.o. male with PMH of GERD, perforated diverticulitis s/p sigmoid colectomy with colostomy 11/20/2023, HTN, and kidney stones. Patient was admitted for planned colostomy take down by Dr. Botello on 6/13/24.      This patient's problems and plans were partially entered by my partner and updated as appropriate by me 06/15/24. Copied text in this note has been reviewed and is accurate as of today's date.     S/P colostomy takedown  Hx of perforated diverticulitis s/p sigmoid colectomy with colostomy 11/20/23  --s/p colostomy takedown (Dr. Botello) with cystoscopy and temporary placement of bilateral ureteral catheters (Dr. Hoffmann) on 6/13/24  --Pitt catheter removed, voiding spontaneously   --Continue pain/nausea meds as needed  --Ambulate  --Await return of bowel function  --Further care per   Romsery     Post-operative anemia  --Watch H&H closely    HTN  --BP well controlled  --Continue home meds (Atenolol, Dyazide)    GERD  --Continue PPI, Pepcid    Home when okay with Dr. Botello.     VTE Prophylaxis:  Pharmacologic & mechanical VTE prophylaxis orders are present.         AM-PAC 6 Clicks Score (PT): 24 (06/14/24 2000)    CODE STATUS:   Code Status and Medical Interventions:   Ordered at: 06/13/24 7657     Level Of Support Discussed With:    Patient     Code Status (Patient has no pulse and is not breathing):    CPR (Attempt to Resuscitate)     Medical Interventions (Patient has pulse or is breathing):    Full Support       Karolyn Tavera MD  06/15/24

## 2024-06-15 NOTE — PLAN OF CARE
Problem: Adult Inpatient Plan of Care  Goal: Absence of Hospital-Acquired Illness or Injury  Intervention: Identify and Manage Fall Risk  Recent Flowsheet Documentation  Taken 6/15/2024 0400 by Jelly Ojeda RN  Safety Promotion/Fall Prevention: safety round/check completed  Taken 6/15/2024 0000 by Jelly Ojeda RN  Safety Promotion/Fall Prevention: safety round/check completed  Taken 6/14/2024 2000 by Jelly Ojeda RN  Safety Promotion/Fall Prevention:   activity supervised   assistive device/personal items within reach   clutter free environment maintained   fall prevention program maintained   nonskid shoes/slippers when out of bed   safety round/check completed     Problem: Adult Inpatient Plan of Care  Goal: Absence of Hospital-Acquired Illness or Injury  Intervention: Prevent Infection  Recent Flowsheet Documentation  Taken 6/15/2024 0400 by Jelly Ojeda RN  Infection Prevention: rest/sleep promoted  Taken 6/15/2024 0000 by Jelly Ojeda RN  Infection Prevention: rest/sleep promoted  Taken 6/14/2024 2000 by Jelly Ojeda RN  Infection Prevention:   cohorting utilized   environmental surveillance performed   rest/sleep promoted   Goal Outcome Evaluation:  Plan of Care Reviewed With: patient

## 2024-06-16 ENCOUNTER — READMISSION MANAGEMENT (OUTPATIENT)
Dept: CALL CENTER | Facility: HOSPITAL | Age: 59
End: 2024-06-16
Payer: COMMERCIAL

## 2024-06-16 VITALS
HEART RATE: 64 BPM | OXYGEN SATURATION: 95 % | DIASTOLIC BLOOD PRESSURE: 85 MMHG | TEMPERATURE: 98.4 F | RESPIRATION RATE: 18 BRPM | BODY MASS INDEX: 29.54 KG/M2 | WEIGHT: 210.98 LBS | HEIGHT: 71 IN | SYSTOLIC BLOOD PRESSURE: 122 MMHG

## 2024-06-16 PROBLEM — Z98.890 S/P COLOSTOMY TAKEDOWN: Status: RESOLVED | Noted: 2024-06-13 | Resolved: 2024-06-16

## 2024-06-16 LAB
ANION GAP SERPL CALCULATED.3IONS-SCNC: 10 MMOL/L (ref 5–15)
BUN SERPL-MCNC: 22 MG/DL (ref 6–20)
BUN/CREAT SERPL: 22 (ref 7–25)
CALCIUM SPEC-SCNC: 8.8 MG/DL (ref 8.6–10.5)
CHLORIDE SERPL-SCNC: 102 MMOL/L (ref 98–107)
CO2 SERPL-SCNC: 29 MMOL/L (ref 22–29)
CREAT SERPL-MCNC: 1 MG/DL (ref 0.76–1.27)
DEPRECATED RDW RBC AUTO: 44.9 FL (ref 37–54)
EGFRCR SERPLBLD CKD-EPI 2021: 87.8 ML/MIN/1.73
ERYTHROCYTE [DISTWIDTH] IN BLOOD BY AUTOMATED COUNT: 13 % (ref 12.3–15.4)
GLUCOSE SERPL-MCNC: 88 MG/DL (ref 65–99)
HCT VFR BLD AUTO: 36.9 % (ref 37.5–51)
HGB BLD-MCNC: 12.4 G/DL (ref 13–17.7)
MCH RBC QN AUTO: 31.3 PG (ref 26.6–33)
MCHC RBC AUTO-ENTMCNC: 33.6 G/DL (ref 31.5–35.7)
MCV RBC AUTO: 93.2 FL (ref 79–97)
PLATELET # BLD AUTO: 225 10*3/MM3 (ref 140–450)
PMV BLD AUTO: 9.6 FL (ref 6–12)
POTASSIUM SERPL-SCNC: 4.1 MMOL/L (ref 3.5–5.2)
RBC # BLD AUTO: 3.96 10*6/MM3 (ref 4.14–5.8)
SODIUM SERPL-SCNC: 141 MMOL/L (ref 136–145)
WBC NRBC COR # BLD AUTO: 9.2 10*3/MM3 (ref 3.4–10.8)

## 2024-06-16 PROCEDURE — 25010000002 ENOXAPARIN PER 10 MG: Performed by: COLON & RECTAL SURGERY

## 2024-06-16 PROCEDURE — 25010000002 KETOROLAC TROMETHAMINE PER 15 MG: Performed by: COLON & RECTAL SURGERY

## 2024-06-16 PROCEDURE — 99238 HOSP IP/OBS DSCHRG MGMT 30/<: CPT | Performed by: HOSPITALIST

## 2024-06-16 PROCEDURE — 85027 COMPLETE CBC AUTOMATED: CPT | Performed by: HOSPITALIST

## 2024-06-16 PROCEDURE — 80048 BASIC METABOLIC PNL TOTAL CA: CPT | Performed by: HOSPITALIST

## 2024-06-16 RX ADMIN — TRIAMTERENE AND HYDROCHLOROTHIAZIDE 1 TABLET: 37.5; 25 TABLET ORAL at 07:43

## 2024-06-16 RX ADMIN — FAMOTIDINE 40 MG: 20 TABLET, FILM COATED ORAL at 07:44

## 2024-06-16 RX ADMIN — KETOROLAC TROMETHAMINE 30 MG: 30 INJECTION, SOLUTION INTRAMUSCULAR; INTRAVENOUS at 06:47

## 2024-06-16 RX ADMIN — ATENOLOL 50 MG: 50 TABLET ORAL at 07:44

## 2024-06-16 RX ADMIN — HYDROCODONE BITARTRATE AND ACETAMINOPHEN 1 TABLET: 5; 325 TABLET ORAL at 04:43

## 2024-06-16 NOTE — DISCHARGE SUMMARY
[facility] Medicine Services  DISCHARGE SUMMARY    Patient Name: Lobo Yu  : 1965  MRN: [id number]    Date of Admission: 2024  7:04 AM  Date of Discharge:  24  Primary Care Physician: Alissa Tony DO    Consults       Date and Time Order Name Status Description    2024  2:45 PM Inpatient Hospitalist Consult              Hospital Course     Presenting Problem: here for colostomy takedown    Active Hospital Problems   No active problems to display.      Resolved Hospital Problems    Diagnosis Date Resolved POA    **S/P colostomy takedown [Z98.890] 2024 Not Applicable          Hospital Course:  Lobo Yu is a 58 y.o. male with PMH of GERD, perforated diverticulitis s/p sigmoid colectomy with colostomy 2023, HTN, and kidney stones. Patient was admitted for planned colostomy take down by Dr. Botello on 24.       S/P colostomy takedown  Hx of perforated diverticulitis s/p sigmoid colectomy with colostomy 23  --s/p colostomy takedown (Dr. Botello) with cystoscopy and temporary placement of bilateral ureteral catheters (Dr. Hoffmann) on 24  --Pitt catheter removed, voiding spontaneously   --Continue pain/nausea meds as needed - Dr. Botello has sent in Rx for PRN Norco at discharge  --Ambulating well, having bowel movements  --Okay for discharge home today per Dr. Botello      Post-operative anemia  --H&H stable this morning     HTN  --BP well controlled  --Continue home meds (Atenolol, Dyazide)     GERD  --Continue PPI, Pepcid      Discharge Follow Up Recommendations for outpatient labs/diagnostics:  F/U with PCP in 1 week  F/U with Dr. Botello in 2 weeks    Day of Discharge     HPI:   Seen this morning. Doing well. Eager for home.     Review of Systems  Gen-no fevers, no chills  CV-no chest pain, no palpitations  Resp-no cough, no dyspnea  GI-no N/V/D, mild abd pain      Vital Signs:   Temp:  [98.1 °F (36.7 °C)-98.4 °F (36.9 °C)]  98.4 °F (36.9 °C)  Heart Rate:  [53-64] 64  Resp:  [15-18] 18  BP: (108-123)/(71-85) 122/85      Physical Exam:  Gen-no acute distress  HENT-NCAT, mucous membranes moist  CV-RRR, S1 S2 normal, no m/r/g  Resp-CTAB, no wheezes or rales  Abd-soft, NT, mildly distended, +BS  Ext-no edema  Neuro-A&Ox3, no focal deficits  Skin-no rashes  Psych-appropriate mood      Pertinent  and/or Most Recent Results     LAB RESULTS:      Lab 06/16/24  0332 06/15/24  0337 06/14/24  0332 06/13/24  1336   WBC 9.20 13.56* 16.62*  --    HEMOGLOBIN 12.4* 11.8* 13.1 14.2   HEMATOCRIT 36.9* 35.1* 38.2 41.6   PLATELETS 225 209 234  --    NEUTROS ABS  --  9.22* 13.92*  --    IMMATURE GRANS (ABS)  --  0.06* 0.06*  --    LYMPHS ABS  --  2.66 0.92  --    MONOS ABS  --  1.53* 1.71*  --    EOS ABS  --  0.04 0.00  --    MCV 93.2 93.4 92.9  --          Lab 06/16/24  0332 06/15/24  0337 06/14/24  0332 06/13/24  0816   SODIUM 141 140 139  --    POTASSIUM 4.1 3.9 4.4 3.9   CHLORIDE 102 104 102  --    CO2 29.0 28.0 26.0  --    ANION GAP 10.0 8.0 11.0  --    BUN 22* 24* 17  --    CREATININE 1.00 1.00 1.07  --    EGFR 87.8 87.8 80.9  --    GLUCOSE 88 91 173*  --    CALCIUM 8.8 8.9 9.4  --                      Lab 06/13/24  0802   ABO TYPING O   RH TYPING Negative   ANTIBODY SCREEN Negative         Brief Urine Lab Results  (Last result in the past 365 days)        Color   Clarity   Blood   Leuk Est   Nitrite   Protein   CREAT   Urine HCG        11/20/23 1147 Yellow   Clear   Negative   Small (1+)   Negative   Negative                 Microbiology Results (last 10 days)       ** No results found for the last 240 hours. **            Peripheral Block    Result Date: 6/13/2024  Donavon Moreno CRNA     6/13/2024  9:43 AM Peripheral Block Patient reassessed immediately prior to procedure Patient location during procedure: OR Start time: 6/13/2024 9:27 AM Reason for block: at surgeon's request and post-op pain management Performed by CRNA/CAA: Telly Bailey CRNA  "Preanesthetic Checklist Completed: patient identified, IV checked, site marked, risks and benefits discussed, surgical consent, monitors and equipment checked, pre-op evaluation and timeout performed Prep: Pt Position: supine Sterile barriers:cap, gloves, mask and washed/disinfected hands Prep: ChloraPrep Patient monitoring: blood pressure monitoring, continuous pulse oximetry and EKG Procedure Sedation: yes Performed under: general Guidance:ultrasound guided Images:still images obtained, printed/placed on chart Laterality:Bilateral Block Type:TAP Injection Technique:single-shot Needle Type:short-bevel and echogenic Needle Gauge:20 G Resistance on Injection: none Medications Used: bupivacaine PF (MARCAINE) 0.25 % injection - Injection  60 mL - 6/13/2024 9:27:00 AM dexamethasone sodium phosphate injection - Injection  4 mg - 6/13/2024 9:27:00 AM Medications Comment:Block Injection:  LA dose divided between Right and Left block Post Assessment Injection Assessment: negative aspiration for heme, incremental injection and no paresthesia on injection Patient Tolerance:comfortable throughout block Complications:no Additional Notes Subcostal TAPs A high-frequency linear transducer, with sterile cover, was placed sub-xiphoid to identify Line[place] Alba, right and left Rectus Abdominus Muscles (RK). The transducer was moved either right or left subcostally to identify the RK and the Transverse Abdominus Muscle (BRAR). The insertion site was prepped in sterile fashion and then localized with 2-5 ml of 1% Lidocaine. Using ultrasound-guidance, a 20-gauge B-Monge 4\" Ultraplex 360 non-stimulating echogenic needle was advanced in plane, from medial to lateral, until the tip of the needle was in the fascial plane between the RK and BRAR. 1-3ml of preservative free normal saline was used to hydro-dissect the fascial planes. After the fascial plane was verified, the local anesthetic (LA) was injected. The procedure was repeated on the " "opposite side for bilateral coverage. Aspiration every 5 ml to prevent intravascular injection. Injection was completed with negative aspiration of blood and negative intravascular injection. Injection pressures were normal with minimal resistance. The subcostal approach to the TAP nerve block ideally anesthetizes the intercostal nerves T6-T9. Mid-Axillary/Lateral TAPs A high-frequency linear transducer, with sterile cover, was placed in the midaxillary line between the ASIS and costal margin. The External Oblique Muscle (EOM), Internal Oblique Muscle (IOM), Transverse Abdominus Muscle (BRAR), and Peritoneum were identified. The insertion site was prepped in sterile fashion and then localized with 2-5 ml of 1% Lidocaine. Using ultrasound-guidance, a 20-gauge B-Monge 4\" Ultraplex 360 non-stimulating echogenic needle was advanced in plane, from medial to lateral, until the tip of the needle was in the fascial plane between the IOM and BRAR. 1-3ml of preservative free normal saline was used to hydro-dissect the fascial planes. After the fascial plane was verified, the local anesthetic (LA) was injected. The procedure was repeated on the opposite side for bilateral coverage. Aspiration every 5 ml to prevent intravascular injection. Injection was completed with negative aspiration of blood and negative intravascular injection. Injection pressures were normal with minimal resistance. Midaxillary TAPs should reach intercostal nerves T10- T11 and the subcostal nerve T12.  Performed by: Donavon Moreno CRNA             Results for orders placed during the hospital encounter of 05/06/24    Adult Transthoracic Echo Complete W/ Cont if Necessary Per Protocol    Interpretation Summary    Left ventricular systolic function is normal. Calculated left ventricular EF of 51%    Left ventricular diastolic function was normal.    Normal right ventricular systolic function    No significant valvular abnormality    No pericardial effusion    " No prior echocardiogram for comparison      Discharge Details        Discharge Medications        New Medications        Instructions Start Date   HYDROcodone-acetaminophen 5-325 MG per tablet  Commonly known as: Norco   1 tablet, Oral, Every 6 Hours PRN, Do not drive if taking medication. Take only for postoperative pain.      HYDROcodone-acetaminophen 5-325 MG per tablet  Commonly known as: Norco   1 tablet, Oral, Every 6 Hours PRN, Take only for postoperative pain. Do not drive if taking medication             Continue These Medications        Instructions Start Date   aspirin 81 MG EC tablet   81 mg, Oral, Daily, OTC      atenolol 50 MG tablet  Commonly known as: TENORMIN   50 mg, Oral, Daily      dicyclomine 10 MG capsule  Commonly known as: BENTYL   20 mg, Oral, 3 Times Daily PRN      docusate sodium 100 MG capsule   100 mg, Oral, Daily      famotidine 40 MG tablet  Commonly known as: PEPCID   40 mg, Oral, Daily      fluticasone 50 MCG/ACT nasal spray  Commonly known as: FLONASE   2 sprays, Each Nare, Daily PRN, OTC      omeprazole 40 MG capsule  Commonly known as: priLOSEC   40 mg, Oral, Daily      triamterene-hydrochlorothiazide 37.5-25 MG per capsule  Commonly known as: DYAZIDE   1 capsule, Oral, Daily      valACYclovir 1000 MG tablet  Commonly known as: VALTREX   1,000 mg, Oral, 2 Times Daily               Allergies   Allergen Reactions    Erythromycin GI Intolerance         Discharge Disposition:  Home or Self Care    Diet:  Hospital:  Diet Order   Procedures    Diet: Regular/House; Fluid Consistency: Thin (IDDSI 0)       Diet Instructions       Diet: Regular/House Diet; Regular (IDDSI 7); Thin (IDDSI 0)      Discharge Diet: Regular/House Diet    Texture: Regular (IDDSI 7)    Fluid Consistency: Thin (IDDSI 0)             Activity:  Activity Instructions       Activity as Tolerated                   CODE STATUS:    Code Status and Medical Interventions:   Ordered at: 06/13/24 3495     Level Of Support  Discussed With:    Patient     Code Status (Patient has no pulse and is not breathing):    CPR (Attempt to Resuscitate)     Medical Interventions (Patient has pulse or is breathing):    Full Support       Future Appointments   Date Time Provider Department [place]   6/24/2024 10:15 AM Alissa Tony DO [facility]       Additional Instructions for the Follow-ups that You Need to Schedule       Discharge Follow-up with PCP   As directed       Currently Documented PCP:    Alissa Tony DO    PCP Phone Number:    [phone]     Follow Up Details: 1 week        Discharge Follow-up with Specialty: Dr. Abad Botello; 2 Weeks   As directed      Specialty: Dr. Abad Botello   Follow Up: 2 Weeks                      Karolyn Tavera MD  06/16/24      Time Spent on Discharge:  I spent  15  minutes on this discharge activity which included: face-to-face encounter with the patient, reviewing the data in the system, coordination of the care with the nursing staff as well as consultants, documentation, and entering orders.

## 2024-06-16 NOTE — OUTREACH NOTE
Prep Survey      Flowsheet Row Responses   [facility] patient discharged from? [place]   Is LACE score < 7 ? No   Eligibility [facility]   Date of Admission 06/13/24   Date of Discharge 06/16/24   Discharge Disposition Home or Self Care   Discharge diagnosis S/P colostomy takedown   Does the patient have one of the following disease processes/diagnoses(primary or secondary)? General Surgery   Does the patient have Home health ordered? No   Is there a DME ordered? No   Prep survey completed? Yes            Fatmata BENAVIDES - Registered Nurse

## 2024-06-16 NOTE — PLAN OF CARE
Goal Outcome Evaluation:  Plan of Care Reviewed With: patient          Pain controlled with toradol before bed and norco in the late AM. No c/o nausea. Good urine output. 2 small, soft bowel movements.      Progress: improving     Problem: Adult Inpatient Plan of Care  Goal: Plan of Care Review  Outcome: Ongoing, Progressing  Flowsheets (Taken 6/15/2024 2227)  Progress: improving  Plan of Care Reviewed With: patient  Goal: Patient-Specific Goal (Individualized)  Outcome: Ongoing, Progressing  Goal: Absence of Hospital-Acquired Illness or Injury  Outcome: Ongoing, Progressing  Intervention: Identify and Manage Fall Risk  Recent Flowsheet Documentation  Taken 6/15/2024 2000 by Lilibeth Cheema RN  Safety Promotion/Fall Prevention: safety round/check completed  Intervention: Prevent Skin Injury  Recent Flowsheet Documentation  Taken 6/15/2024 2000 by Lilibeth Cheema RN  Body Position: position changed independently  Skin Protection: adhesive use limited  Intervention: Prevent and Manage VTE (Venous Thromboembolism) Risk  Recent Flowsheet Documentation  Taken 6/15/2024 2112 by Lilibeth Cheema RN  Activity Management: ambulated outside room  Taken 6/15/2024 2000 by Lilibeth Cheema RN  Activity Management: up in chair  VTE Prevention/Management: (ambulates freq)   bilateral   sequential compression devices off  Range of Motion:   ROM (range of motion) performed   active ROM (range of motion) encouraged  Intervention: Prevent Infection  Recent Flowsheet Documentation  Taken 6/15/2024 2000 by Lilibeth Cheema RN  Infection Prevention: rest/sleep promoted  Goal: Optimal Comfort and Wellbeing  Outcome: Ongoing, Progressing  Intervention: Provide Person-Centered Care  Recent Flowsheet Documentation  Taken 6/15/2024 2000 by Lilibeth Cheema RN  Trust Relationship/Rapport:   care explained   choices provided  Goal: Readiness for Transition of Care  Outcome: Ongoing, Progressing     Problem: Pain Acute  Goal: Acceptable  Pain Control and Functional Ability  Outcome: Ongoing, Progressing  Intervention: Prevent or Manage Pain  Recent Flowsheet Documentation  Taken 6/15/2024 2000 by Lilibeth Cheema, RN  Sensory Stimulation Regulation:   care clustered   lighting decreased  Sleep/Rest Enhancement:   awakenings minimized   room darkened  Intervention: Optimize Psychosocial Wellbeing  Recent Flowsheet Documentation  Taken 6/15/2024 2000 by Lilibeth Cheema, RN  Supportive Measures: active listening utilized  Diversional Activities:   television   smartphone

## 2024-06-17 ENCOUNTER — TRANSITIONAL CARE MANAGEMENT TELEPHONE ENCOUNTER (OUTPATIENT)
Dept: CALL CENTER | Facility: HOSPITAL | Age: 59
End: 2024-06-17
Payer: COMMERCIAL

## 2024-06-17 NOTE — OUTREACH NOTE
Call Center TCM Note      Flowsheet Row Responses   [facility] patient discharged from? [place]   Does the patient have one of the following disease processes/diagnoses(primary or secondary)? General Surgery   TCM attempt successful? Yes   Call start time 1506   Call end time 1508   Discharge diagnosis S/P colostomy takedown   Meds reviewed with patient/caregiver? Yes   Is the patient having any side effects they believe may be caused by any medication additions or changes? No   Does the patient have all medications related to this admission filled (includes all antibiotics, pain medications, etc.) Yes   Is the patient taking all medications as directed (includes completed medication regime)? Yes   Does the patient have an appointment with their PCP within 7-14 days of discharge? Yes   Has home health visited the patient within 72 hours of discharge? N/A   Psychosocial issues? No   Did the patient receive a copy of their discharge instructions? Yes   Nursing interventions Reviewed instructions with patient   What is the patient's perception of their health status since discharge? Improving   Nursing interventions Nurse provided patient education   Is the patient /caregiver able to teach back basic post-op care? Drive as instructed by MD in discharge instructions, Take showers only when approved by MD-sponge bathe until then, No tub bath, swimming, or hot tub until instructed by MD, Lifting as instructed by MD in discharge instructions   Is the patient/caregiver able to teach back signs and symptoms of incisional infection? Increased redness, swelling or pain at the incisonal site, Increased drainage or bleeding, Incisional warmth, Pus or odor from incision, Fever   Is the patient/caregiver able to teach back steps to recovery at home? Set small, achievable goals for return to baseline health, Rest and rebuild strength, gradually increase activity, Make a list of questions for surgeon's appointment   If the  patient is a current smoker, are they able to teach back resources for cessation? Not a smoker   Is the patient/caregiver able to teach back the hierarchy of who to call/visit for symptoms/problems? PCP, Specialist, Home health nurse, Urgent Care, ED, 911 Yes   TCM call completed? Yes   Call end time 1508   Would this patient benefit from a Referral to [facility] Social Work? No   Is the patient interested in additional calls from an ambulatory ? No            Thalia Mcdermott LPN    6/17/2024, 15:14 EDT

## 2024-06-18 NOTE — PAYOR COMM NOTE
"Auth# [id number]   6/13/24 Surgical Admission Date  6/16/24 Discharge Date    Utilization Review  Phone [phone]  Fax [phone]    [place] [address]           Delores Cueva (58 y.o. Male)       Date of Birth   1965    Social Security Number       Address   [address] [place] [address]    Home Phone   [phone]    MRN   [id number]       Druze   None    Marital Status                               Admission Date   6/13/24    Admission Type   Elective    Admitting Provider   Abad Botello MD    Attending Provider       Department, Room/Bed   [facility] 5G, S552/1       Discharge Date   6/16/2024    Discharge Disposition   Home or Self Care    Discharge Destination                                 Attending Provider: (none)   Allergies: Erythromycin    Isolation: None   Infection: None   Code Status: Prior    Ht: 180.3 cm (71\")   Wt: 95.7 kg (210 lb 15.7 oz)    Admission Cmt: None   Principal Problem: S/P colostomy takedown [Z98.890]                   Active Insurance as of 6/13/2024       Primary Coverage       Payor Plan Insurance Group Employer/Plan Group    [employer]HE[employer] BLUE CROSS ANTHEM BLUE CROSS BLUE SHIELD PPO [id number]       Payor Plan Address Payor Plan Phone Number Payor Plan Fax Number Effective Dates    [address] [phone]  11/22/2020 - None Entered    [place] [address]         Subscriber Name Subscriber Birth Date Member ID       DELORES CUEVA 1965 [id number]                     Emergency Contacts        (Rel.) Home Phone Work Phone Mobile Phone    CALIXTO CUEVA (Spouse) -- -- [phone]                 Operative/Procedure Notes (all)        Op Note signed by New Onbase, [place] at 05/29/24 1028   Version 1 of 1       [Media Unavailable] Scan on 5/29/2024 1023 by New Onbase, [place]: EXP LAP/SIGMOID COLECTOMY/MULTIPLE OPERATIVE NOTE, BHLEX, " 11/20/2023          Electronically signed by New Onbase, [employer] at 05/29/24 1028       Mitchell Hoffmann MD at 06/13/24 0940  Version 1 of 1      Summary:Urology Operative Note                 Cystourethroscopy & Intraoperative Bilateral Catheter Placement Operative Report    Patient Name:  Lobo Yu  YOB: 1965    Date of Surgery:  6/13/2024     Indications: 58-year-old male with history of perforated diverticulitis, presents today for open colostomy takedown.  Prior history includes sigmoid colectomy.  Urology consulted intraoperatively for ureteral identification catheter placement.    Pre-op Diagnosis:   Diverticulitis       Post-Op Diagnosis Codes:  Diverticulitis    Procedure/CPT® Codes: 28669, 01628    1. CYSTOSCOPY BILATERAL URETERAL CATHETER/STENT INSERTION  2. MODIFIER 50, BILATERAL STENT PLACEMENT  3. BRANDT CATHETER INSERTION  CHANGE    Staff:  Surgeon(s):  Mitchell Hoffmann MD      Anesthesia: General with Block    Estimated Blood Loss: none    Implants:    Nothing was implanted during the procedure    Specimen:          None        Findings:   Normal pendulous, bulbar, prostatic urethra.  Normal urinary bladder.  Insertion of bilateral 5 Ethiopian ureteral identification catheter.  Brandt catheter insertion    Complications: None immediate    Description of Procedure:   The patient was identified in the preoperative holding area where informed consent was reviewed and signed. The patient was transported to the operating room per anesthesia and placed supine on the operating table. Smooth endotracheal intubation was performed without issue after administration of general anesthesia. The patient was then placed in the dorsal lithotomy position where genitals were prepped and draped in the usual sterile fashion. A brief timeout was performed identifying the correct patient procedure and laterality. Perioperative antibiotics were administered. All pressure points were padded.       Procedure began by inserting a 22 Iranian cystoscope atraumatically per the patient's urethra. Upon entering the bladder formal cystoscopy was performed identifying bilateral orthotopic ureteral orifices and no evidence of tumor, stone, foreign body. Attention was then turned to the right ureteral orifice. A 5 Iranian open ended ureteral catheter was inserted into the distal ureter and passed up to the level right renal collecting system without difficulty.  Attention was then turned to the left ureteral orifice.  A 5 Iranian open ended ureteral catheter was inserted into the distal ureter and passed up to the level left renal collecting system without difficulty.        A 16F catheter was placed.  10 mL placed in the balloon.  The ureteral catheters were affixed to the Pitt catheter.     Patient remained under general anesthesia.  The patient will continue scheduled procedure with colorectal service, Dr. Rosmery MD. Urology service available to assist in any way during the procedure.    Mitchell Hoffmann MD     Date: 6/13/2024  Time: 10:11 EDT        Electronically signed by Mitchell Hoffmann MD at 06/13/24 1013       Abad Botello MD at 06/13/24 0940  Version 1 of 1         COLOSTOMY TAKEDOWN OR CLOSURE  Procedure Report    Patient Name:  Lobo Yu  YOB: 1965    Date of Surgery:  6/13/2024       Pre-op Diagnosis:   Presence of descending colostomy  Post-op Diagnosis:     Presence of descending colostomy     Procedure: Takedown of descending colostomy, Dr. Botello  Cystoscopy with placement of ureteral catheters, Dr. Hoffmann    Staff:  Surgeon(s):  Abad Botello MD Stark, Timothy, MD    Assistant: Libby Brown RNFA    Assistant: Libby Brown RNFA  was responsible for performing the following activities: Retraction, Suction, Irrigation, Suturing, Closing, and Placing Dressing and their skilled assistance was necessary for the success of this case.    Anesthesia: General with  Block    Estimated Blood Loss: 300 mL    Specimen:    Specimens       ID Source Type Tests Collected By Collected At Frozen?    A Large Intestine, Sigmoid Colon Tissue TISSUE PATHOLOGY EXAM   Abad Botello MD 6/13/24 1040 No    Description: SIGMOID COLON, STOMA, AND DONUTS - FOR PERMANENT    This specimen was not marked as sent.              Findings: The patient's liver was smooth without focal or diffuse defect palpated.  There were numerous adhesions between the small bowel and small bowel and omentum and small bowel and omentum and abdominal wall.  The divided rectosigmoid was identified with Prolene suture.  There was no evidence of purulence or retained abscess.          Complications: None          Description of Procedure:   After the patient was properly identified he was brought to the operating room and placed in the comfortable supine position.  Once general anesthesia had been obtained a tap block was performed the patient was placed in lithotomy position.  Timeout was performed.  Dr. Hoffmann placed ureteral catheters which will be dictated separately by him.  Following this patient's abdomen and perineum were prepped and sterilely draped.  Healed midline incision was entered using a scalpel and divided using electrocautery through all layers of the midline abdominal wall from the umbilicus to the pubis.  The abdomen was explored with the findings noted above.  The adhesions were taken down.  A 5 mm serosal tear was noted in the ileum which was repaired with 2 interrupted 3-cystoscopy junction.  Vicryl.  The rectosigmoid was identified and the retrorectal space was entered.  The peritoneum on either side was divided and joined below the bladder.  This allowed the rectosigmoid to be mobilized.  A point was identified the upper rectum where the tinea deck he stated.  The mesentery was divided at this point and ligated with 0 Vicryl.  The bowel was divided using the 45 mm thick tissue TX stapler.  The  "specimen was sent to pathology.  The stoma was then taken down use electrocautery and sharp dissection.  The mucocutaneous edge was divided with elliptical incision using a scalpel and then the dissection was continued with electrocautery.  Once the descending colon been fully mobilized it was brought into the abdominal cavity.  A point below the mucocutaneous junction with the bowel appeared healthy and pliable was selected for the proximal margin of resection.  The mesentery was divided tween clamps and ligated with 0 Vicryl at this point.  The bowel was sharply divided and the stoma was sent to pathology.  A 29 mm circular Ethicon stapler was used for the anastomosis.  The anvil was placed in the proximal bowel and secured with a 2-0 Prolene pursestring.  This was secured with a 0 Vicryl.  The anvil was advanced down to the pelvis and noted to reach the rectal stump without tension.  The rectal stump was i examined and a small vessel was noted to be bleeding at the staple line.  This was secured with a 3-0 Vicryl figure-of-eight suture.  Following this good hemostasis was noted.  The small and medium sizers were brought through the anal canal and advanced to the apex of the rectal stump.  The stapling device was then brought to the apex of the rectal stump and the trocar was brought out just above the staple line.  The 2 ends of the instrument were brought together and tightened to the appropriate tension.  Care was taken to maintain proper orientation.  The instrument was fired and removed.  Two good \"donuts\" were obtained.  The patient was proctoscope while the pelvis was filled with saline and the bowel above the anastomosis was occluded.  The anastomosis was patent with good blood supply and hemostatic.  It was located 15 cm.  It was noted to be without air leak.  The proctoscope was removed.  The surgeon changed gown and gloves.  The abdomen was irrigated and good hemostasis was noted.  The small bowel was " run and no focal diffuse abnormalities were noted.  There were several violin string type adhesions that were taken down.  The omentum was draped over the small bowel.  The fascial defect at the stoma site was closed with running #1 PDS.  The fascia of the midline incision was closed using running #1 PDS.  The subcutaneous tissue was irrigated.  The subcutaneous tissue of the stoma was approximated with 3-0 Vicryl.  The skin was closed using staples.  Telfa palma were placed between the staples.  The patient tolerated the procedure well.  The sponge and needle count was correct.  He was taken the recovery room in good condition.   Abad Botello MD     Date: 6/13/2024  Time: 12:11 EDT      Electronically signed by Abad Botello MD at 06/13/24 1223          Physician Progress Notes (all)        Abad Botello MD at 06/15/24 1550          Lobo Yu     LOS: 2 days     Subjective     Afebrile with stable blood pressure.  Urine output adequate.  Passing stool.  Hemoglobin 11.8.  White blood cell count 13.5.  Potassium 3.9.  Creatinine 1.0.    Objective     Vital Signs  Vitals:    06/15/24 0416   BP: 120/82   Pulse:    Resp: 18   Temp: 98.2 °F (36.8 °C)   SpO2: 96%       I/O  Intake & Output (last day)         06/14 0701  06/15 0700 06/15 0701  06/16 0700    P.O.      I.V. (mL/kg)      IV Piggyback      Total Intake(mL/kg)      Urine (mL/kg/hr) 1400 (0.6)     Stool 0     Blood      Total Output 1400     Net -1400           Stool Unmeasured Occurrence 2 x             Physical Exam:    Abdomen soft and nontender.  Wound healing well.  Palma removed.         Results Review:       WBC WBC   Date Value Ref Range Status   06/15/2024 13.56 (H) 3.40 - 10.80 10*3/mm3 Final   06/14/2024 16.62 (H) 3.40 - 10.80 10*3/mm3 Final      HGB Hemoglobin   Date Value Ref Range Status   06/15/2024 11.8 (L) 13.0 - 17.7 g/dL Final   06/14/2024 13.1 13.0 - 17.7 g/dL Final   06/13/2024 14.2 13.0 - 17.7 g/dL Final      HCT  "Hematocrit   Date Value Ref Range Status   06/15/2024 35.1 (L) 37.5 - 51.0 % Final   06/14/2024 38.2 37.5 - 51.0 % Final   06/13/2024 41.6 37.5 - 51.0 % Final      PLT        Results from last 7 days   Lab Units 06/15/24  0337   PLATELETS 10*3/mm3 209            Sodium Sodium   Date Value Ref Range Status   06/15/2024 140 136 - 145 mmol/L Final   06/14/2024 139 136 - 145 mmol/L Final      Potassium Potassium   Date Value Ref Range Status   06/15/2024 3.9 3.5 - 5.2 mmol/L Final   06/14/2024 4.4 3.5 - 5.2 mmol/L Final   06/13/2024 3.9 3.5 - 5.2 mmol/L Final     Comment:     Slight hemolysis detected by analyzer. Result may be falsely elevated.      Chloride Chloride   Date Value Ref Range Status   06/15/2024 104 98 - 107 mmol/L Final   06/14/2024 102 98 - 107 mmol/L Final      Bicarbonate No results found for: \"PLASMABICARB\"   BUN BUN   Date Value Ref Range Status   06/15/2024 24 (H) 6 - 20 mg/dL Final   06/14/2024 17 6 - 20 mg/dL Final      Creatinine Creatinine   Date Value Ref Range Status   06/15/2024 1.00 0.76 - 1.27 mg/dL Final   06/14/2024 1.07 0.76 - 1.27 mg/dL Final      Calcium Calcium   Date Value Ref Range Status   06/15/2024 8.9 8.6 - 10.5 mg/dL Final   06/14/2024 9.4 8.6 - 10.5 mg/dL Final      Assessment & Plan       S/P colostomy takedown      Patient doing well postop day 2 status post takedown of colostomy.  He is tolerating his diet and up and ambulating.  He remains afebrile and his hemoglobin is stable.  Patient will likely be able to go home tomorrow from my standpoint.  Follow-up in the office a week from Wednesday for staple removal.  He is not lifting thing over 10 pounds for 8 weeks.  He is to have assistance if he showers or climb stairs.  He will be given a prescription for Norco 5 1 p.o. every 6 hours as needed pain #20.  He is not to drive if taking the medication.  He may resume a regular diet.  He is to call for pain, fever, or bleeding.      Abad Botello MD  06/15/24  15:50 " EDT          Electronically signed by Abad Botello MD at 06/15/24 1601       Karolyn Tavera MD at 06/15/24 1201              [facility] Medicine Services  PROGRESS NOTE    Patient Name: Lobo Yu  : 1965  MRN: [id number]    Date of Admission: 2024  Primary Care Physician: Alissa Tony DO    Subjective   Subjective     CC:  F/U s/p colostomy takedown    HPI:  Seen this morning. No significant bowel movement yet. Passing gas. Having some cramping.       Objective   Objective     Vital Signs:   Temp:  [97.6 °F (36.4 °C)-98.2 °F (36.8 °C)] 98.2 °F (36.8 °C)  Heart Rate:  [64-72] 72  Resp:  [16-18] 18  BP: ()/(68-82) 120/82     Physical Exam:  Gen-no acute distress  HENT-NCAT, mucous membranes moist  CV-RRR, S1 S2 normal, no m/r/g  Resp-CTAB, no wheezes or rales  Abd-soft, mild TTP, mild to moderate distension, hypoactive BS  Ext-no edema  Neuro-A&Ox3, no focal deficits  Skin-no rashes  Psych-appropriate mood      Results Reviewed:  LAB RESULTS:      Lab 06/15/24  0337 06/14/24  03324  1336   WBC 13.56* 16.62*  --    HEMOGLOBIN 11.8* 13.1 14.2   HEMATOCRIT 35.1* 38.2 41.6   PLATELETS 209 234  --    NEUTROS ABS 9.22* 13.92*  --    IMMATURE GRANS (ABS) 0.06* 0.06*  --    LYMPHS ABS 2.66 0.92  --    MONOS ABS 1.53* 1.71*  --    EOS ABS 0.04 0.00  --    MCV 93.4 92.9  --          Lab 06/15/24  03324  0332 24  0816   SODIUM 140 139  --    POTASSIUM 3.9 4.4 3.9   CHLORIDE 104 102  --    CO2 28.0 26.0  --    ANION GAP 8.0 11.0  --    BUN 24* 17  --    CREATININE 1.00 1.07  --    EGFR 87.8 80.9  --    GLUCOSE 91 173*  --    CALCIUM 8.9 9.4  --                      Lab 24  0802   ABO TYPING O   RH TYPING Negative   ANTIBODY SCREEN Negative         Brief Urine Lab Results  (Last result in the past 365 days)        Color   Clarity   Blood   Leuk Est   Nitrite   Protein   CREAT   Urine HCG        23 1147 Yellow   Clear   Negative   Small  (1+)   Negative   Negative                   Microbiology Results Abnormal       None            No radiology results from the last 24 hrs    Results for orders placed during the hospital encounter of 05/06/24    Adult Transthoracic Echo Complete W/ Cont if Necessary Per Protocol    Interpretation Summary    Left ventricular systolic function is normal. Calculated left ventricular EF of 51%    Left ventricular diastolic function was normal.    Normal right ventricular systolic function    No significant valvular abnormality    No pericardial effusion    No prior echocardiogram for comparison      Current medications:  Scheduled Meds:atenolol, 50 mg, Oral, Daily  enoxaparin, 40 mg, Subcutaneous, Daily  famotidine, 40 mg, Oral, Daily  triamterene-hydrochlorothiazide, 1 tablet, Oral, Daily      Continuous Infusions:Scopolamine, 1 patch      PRN Meds:.  acetaminophen    fluticasone    HYDROcodone-acetaminophen    HYDROmorphone **AND** naloxone    ketorolac    ondansetron    simethicone    Assessment & Plan   Assessment & Plan     Active Hospital Problems    Diagnosis  POA    **S/P colostomy takedown [Z98.890]  Not Applicable      Resolved Hospital Problems   No resolved problems to display.        Brief Hospital Course to date:  oLbo Yu is a 58 y.o. male with PMH of GERD, perforated diverticulitis s/p sigmoid colectomy with colostomy 11/20/2023, HTN, and kidney stones. Patient was admitted for planned colostomy take down by Dr. Botello on 6/13/24.      This patient's problems and plans were partially entered by my partner and updated as appropriate by me 06/15/24. Copied text in this note has been reviewed and is accurate as of today's date.     S/P colostomy takedown  Hx of perforated diverticulitis s/p sigmoid colectomy with colostomy 11/20/23  --s/p colostomy takedown (Dr. Botello) with cystoscopy and temporary placement of bilateral ureteral catheters (Dr. Hoffmann) on 6/13/24  --Pitt catheter removed,  voiding spontaneously   --Continue pain/nausea meds as needed  --Ambulate  --Await return of bowel function  --Further care per Dr. Botello     Post-operative anemia  --Watch H&H closely    HTN  --BP well controlled  --Continue home meds (Atenolol, Dyazide)    GERD  --Continue PPI, Pepcid    Home when okay with Dr. Botello.     VTE Prophylaxis:  Pharmacologic & mechanical VTE prophylaxis orders are present.         AM-PAC 6 Clicks Score (PT): 24 (24)    CODE STATUS:   Code Status and Medical Interventions:   Ordered at: 24 1445     Level Of Support Discussed With:    Patient     Code Status (Patient has no pulse and is not breathing):    CPR (Attempt to Resuscitate)     Medical Interventions (Patient has pulse or is breathing):    Full Support       Karolyn Tavera MD  06/15/24        Electronically signed by Karolyn Tavera MD at 06/15/24 1203       Karolyn Tavera MD at 24 1222              [facility] Medicine Services  PROGRESS NOTE    Patient Name: Lobo Yu  : 1965  MRN: [id number]    Date of Admission: 2024  Primary Care Physician: Alissa Tony DO    Subjective   Subjective     CC:  F/U s/p colostomy takedown    HPI:  Seen this morning. Having some gas pains. Pitt removed and he just voided on his own.       Objective   Objective     Vital Signs:   Temp:  [97 °F (36.1 °C)-98.3 °F (36.8 °C)] 98.3 °F (36.8 °C)  Heart Rate:  [57-88] 65  Resp:  [16-20] 16  BP: ()/(53-94) 99/63  Flow (L/min):  [2] 2     Physical Exam:  Gen-no acute distress  HENT-NCAT, mucous membranes moist  CV-RRR, S1 S2 normal, no m/r/g  Resp-CTAB, no wheezes or rales  Abd-soft, mild TTP, ND, +BS  Ext-no edema  Neuro-A&Ox3, no focal deficits  Skin-no rashes  Psych-appropriate mood      Results Reviewed:  LAB RESULTS:      Lab 24  0332 24  1336   WBC 16.62*  --    HEMOGLOBIN 13.1 14.2   HEMATOCRIT 38.2 41.6   PLATELETS 234  --    NEUTROS ABS 13.92*  --     IMMATURE GRANS (ABS) 0.06*  --    LYMPHS ABS 0.92  --    MONOS ABS 1.71*  --    EOS ABS 0.00  --    MCV 92.9  --          Lab 06/14/24  0332 06/13/24  0816   SODIUM 139  --    POTASSIUM 4.4 3.9   CHLORIDE 102  --    CO2 26.0  --    ANION GAP 11.0  --    BUN 17  --    CREATININE 1.07  --    EGFR 80.9  --    GLUCOSE 173*  --    CALCIUM 9.4  --                      Lab 06/13/24  0802   ABO TYPING O   RH TYPING Negative   ANTIBODY SCREEN Negative         Brief Urine Lab Results  (Last result in the past 365 days)        Color   Clarity   Blood   Leuk Est   Nitrite   Protein   CREAT   Urine HCG        11/20/23 1147 Yellow   Clear   Negative   Small (1+)   Negative   Negative                   Microbiology Results Abnormal       None            Peripheral Block    Result Date: 6/13/2024  Donavon Moreno CRNA     6/13/2024  9:43 AM Peripheral Block Patient reassessed immediately prior to procedure Patient location during procedure: OR Start time: 6/13/2024 9:27 AM Reason for block: at surgeon's request and post-op pain management Performed by CRNA/CAA: Telly Bailey CRNA Preanesthetic Checklist Completed: patient identified, IV checked, site marked, risks and benefits discussed, surgical consent, monitors and equipment checked, pre-op evaluation and timeout performed Prep: Pt Position: supine Sterile barriers:cap, gloves, mask and washed/disinfected hands Prep: ChloraPrep Patient monitoring: blood pressure monitoring, continuous pulse oximetry and EKG Procedure Sedation: yes Performed under: general Guidance:ultrasound guided Images:still images obtained, printed/placed on chart Laterality:Bilateral Block Type:TAP Injection Technique:single-shot Needle Type:short-bevel and echogenic Needle Gauge:20 G Resistance on Injection: none Medications Used: bupivacaine PF (MARCAINE) 0.25 % injection - Injection  60 mL - 6/13/2024 9:27:00 AM dexamethasone sodium phosphate injection - Injection  4 mg - 6/13/2024 9:27:00 AM  "Medications Comment:Block Injection:  LA dose divided between Right and Left block Post Assessment Injection Assessment: negative aspiration for heme, incremental injection and no paresthesia on injection Patient Tolerance:comfortable throughout block Complications:no Additional Notes Subcostal TAPs A high-frequency linear transducer, with sterile cover, was placed sub-xiphoid to identify [place], right and left Rectus Abdominus Muscles (RK). The transducer was moved either right or left subcostally to identify the RK and the Transverse Abdominus Muscle (BRAR). The insertion site was prepped in sterile fashion and then localized with 2-5 ml of 1% Lidocaine. Using ultrasound-guidance, a 20-gauge B-Monge 4\" Ultraplex 360 non-stimulating echogenic needle was advanced in plane, from medial to lateral, until the tip of the needle was in the fascial plane between the RK and BRAR. 1-3ml of preservative free normal saline was used to hydro-dissect the fascial planes. After the fascial plane was verified, the local anesthetic (LA) was injected. The procedure was repeated on the opposite side for bilateral coverage. Aspiration every 5 ml to prevent intravascular injection. Injection was completed with negative aspiration of blood and negative intravascular injection. Injection pressures were normal with minimal resistance. The subcostal approach to the TAP nerve block ideally anesthetizes the intercostal nerves T6-T9. Mid-Axillary/Lateral TAPs A high-frequency linear transducer, with sterile cover, was placed in the midaxillary line between the ASIS and costal margin. The External Oblique Muscle (EOM), Internal Oblique Muscle (IOM), Transverse Abdominus Muscle (BRAR), and Peritoneum were identified. The insertion site was prepped in sterile fashion and then localized with 2-5 ml of 1% Lidocaine. Using ultrasound-guidance, a 20-gauge B-Monge 4\" Ultraplex 360 non-stimulating echogenic needle was advanced in plane, from " medial to lateral, until the tip of the needle was in the fascial plane between the IOM and BRAR. 1-3ml of preservative free normal saline was used to hydro-dissect the fascial planes. After the fascial plane was verified, the local anesthetic (LA) was injected. The procedure was repeated on the opposite side for bilateral coverage. Aspiration every 5 ml to prevent intravascular injection. Injection was completed with negative aspiration of blood and negative intravascular injection. Injection pressures were normal with minimal resistance. Midaxillary TAPs should reach intercostal nerves T10- T11 and the subcostal nerve T12.  Performed by: Donavon Moreno CRNA     Results for orders placed during the hospital encounter of 05/06/24    Adult Transthoracic Echo Complete W/ Cont if Necessary Per Protocol    Interpretation Summary    Left ventricular systolic function is normal. Calculated left ventricular EF of 51%    Left ventricular diastolic function was normal.    Normal right ventricular systolic function    No significant valvular abnormality    No pericardial effusion    No prior echocardiogram for comparison      Current medications:  Scheduled Meds:alvimopan, 12 mg, Oral, BID  atenolol, 50 mg, Oral, Daily  enoxaparin, 40 mg, Subcutaneous, Daily  famotidine, 40 mg, Oral, Daily  triamterene-hydrochlorothiazide, 1 tablet, Oral, Daily      Continuous Infusions:dextrose 5 % and sodium chloride 0.45 % with KCl 20 mEq/L, 100 mL/hr, Last Rate: 100 mL/hr (06/14/24 0609)  Scopolamine, 1 patch      PRN Meds:.  acetaminophen    fluticasone    HYDROcodone-acetaminophen    HYDROmorphone **AND** naloxone    ketorolac    ondansetron    Assessment & Plan   Assessment & Plan     Active Hospital Problems    Diagnosis  POA    **S/P colostomy takedown [Z98.890]  Not Applicable      Resolved Hospital Problems   No resolved problems to display.        Brief Hospital Course to date:  Loob Yu is a 58 y.o. male with PMH of  GERD, perforated diverticulitis s/p sigmoid colectomy with colostomy 11/20/2023, HTN, and kidney stones. Patient was admitted for planned colostomy take down by Dr. Botello on 6/13/24.      This patient's problems and plans were partially entered by my partner and updated as appropriate by me 06/14/24. Copied text in this note has been reviewed and is accurate as of today's date.     S/P colostomy takedown  Hx of perforated diverticulitis s/p sigmoid colectomy with colostomy 11/20/23  --s/p colostomy takedown (Dr. Botello) with cystoscopy and temporary placement of bilateral ureteral catheters (Dr. Hoffmann) on 6/13/24  --Pitt catheter removed, voiding spontaneously this morning  --Continue pain/nausea meds as needed  --Ambulate  --Await return of bowel function  --Further care per Dr. Botello     HTN  --BP well controlled  --Continue home meds (Atenolol, Dyazide)    GERD  --Continue PPI, Pepcid    Home when okay with Dr. Botello.     VTE Prophylaxis:  Pharmacologic & mechanical VTE prophylaxis orders are present.         AM-PAC 6 Clicks Score (PT): 24 (06/14/24 0800)    CODE STATUS:   Code Status and Medical Interventions:   Ordered at: 06/13/24 1445     Level Of Support Discussed With:    Patient     Code Status (Patient has no pulse and is not breathing):    CPR (Attempt to Resuscitate)     Medical Interventions (Patient has pulse or is breathing):    Full Support       Karolyn Tavera MD  06/14/24        Electronically signed by Karolyn Tavera MD at 06/14/24 1226       Abad Botello MD at 06/14/24 1219          Lobo Yu     LOS: 1 day     Subjective     Patient feels well with some incisional tenderness and tolerating diet but no stool or gas yet.  Passing urine.  2850 mL out yesterday.  Up and ambulating.  Afebrile.  Blood pressure 99/63.  Pulse 65.      Objective     Vital Signs  Vitals:    06/14/24 1128   BP: 99/63   Pulse: 65   Resp: 16   Temp: 98.3 °F (36.8 °C)   SpO2:        I/O  Intake & Output  "(last day)         06/13 0701  06/14 0700 06/14 0701  06/15 0700    P.O. 720     I.V. (mL/kg) 2856.7 (29.9)     IV Piggyback 400     Total Intake(mL/kg) 3976.7 (41.6)     Urine (mL/kg/hr) 2850 300 (0.6)    Blood 300     Total Output 3150 300    Net +826.7 -300                  Physical Exam:    Abdomen soft and minimally tender.  Dressing with some old blood.         Results Review:       WBC WBC   Date Value Ref Range Status   06/14/2024 16.62 (H) 3.40 - 10.80 10*3/mm3 Final      HGB Hemoglobin   Date Value Ref Range Status   06/14/2024 13.1 13.0 - 17.7 g/dL Final   06/13/2024 14.2 13.0 - 17.7 g/dL Final      HCT Hematocrit   Date Value Ref Range Status   06/14/2024 38.2 37.5 - 51.0 % Final   06/13/2024 41.6 37.5 - 51.0 % Final      PLT        Results from last 7 days   Lab Units 06/14/24  0332   PLATELETS 10*3/mm3 234            Sodium Sodium   Date Value Ref Range Status   06/14/2024 139 136 - 145 mmol/L Final      Potassium Potassium   Date Value Ref Range Status   06/14/2024 4.4 3.5 - 5.2 mmol/L Final   06/13/2024 3.9 3.5 - 5.2 mmol/L Final     Comment:     Slight hemolysis detected by analyzer. Result may be falsely elevated.      Chloride Chloride   Date Value Ref Range Status   06/14/2024 102 98 - 107 mmol/L Final      Bicarbonate No results found for: \"PLASMABICARB\"   BUN BUN   Date Value Ref Range Status   06/14/2024 17 6 - 20 mg/dL Final      Creatinine Creatinine   Date Value Ref Range Status   06/14/2024 1.07 0.76 - 1.27 mg/dL Final      Calcium Calcium   Date Value Ref Range Status   06/14/2024 9.4 8.6 - 10.5 mg/dL Final      Magnesium No results found for: \"MG\"         Imaging Results (Last 24 Hours)       ** No results found for the last 24 hours. **            Assessment & Plan       S/P colostomy takedown      Patient postop day 1 status post takedown of colostomy.  Tolerating diet and ambulating, but no bowel activity yet.  Will DC IV.  Continue ambulation.  Await return of bowel function.  Will " need jenifer removed tomorrow.    Abad Botello MD  24  12:19 EDT          Electronically signed by Abad Botello MD at 24 1222          Consult Notes (all)        Donya Douglass APRN at 24 1619               [facility] Medicine Services  CONSULT NOTE      Patient Name: Lobo Yu  : 1965  MRN: [id number]    Primary Care Physician: Alissa Tony DO  Provider requesting consultation: Abad Botello MD    Subjective   Subjective     Reason for Consultation:  Medical management     HPI:  Lobo Yu is a 58 y.o. male with PMH of GERD, Diverticulitis with colostomy, HTN, kidney stones. Patient was admitted for planned colostomy take down by Dr. Botello on 24. We were asked to see the patient post op for medical management.     Patient was sitting up in bed with family at bedside. He has been up walking in halls. He has belched and is tolerating liquids. Pain is controlled.       Review of Systems  Gen- No fevers, chills  CV- No chest pain, palpitations  Resp- No cough, dyspnea  GI- No N/V/D, abd pain    All other systems reviewed and are negative.     Personal History     Past Medical History:   Diagnosis Date    Diverticulitis     GERD (gastroesophageal reflux disease)     Hypertension     Kidney stones     X2 EPISODES    Wears glasses        Past Surgical History:   Procedure Laterality Date    APPENDECTOMY      COLONOSCOPY      EXPLORATORY LAPAROTOMY N/A 2023    Procedure: EXPLORATORY LAPAROTOMY, SIGMOID COLECTOMY WITH COLOSTOMY CREAION, INTRA-ABDOMINAL ABSCESS DRAINAGE, APPENDECTOMY;  Surgeon: Hitesh Vallecillo MD;  Location: [facility];  Service: General;  Laterality: N/A;    NEPHROLITHOTOMY      TONSILLECTOMY      WISDOM TOOTH EXTRACTION         Family History:  family history is not on file. Otherwise pertinent FHx was reviewed and unremarkable.     Social History:  reports that he quit smoking about 43 years ago. His  smoking use included cigarettes. He started smoking about 30 years ago. He has a 15.2 pack-year smoking history. He has been exposed to tobacco smoke. He has never used smokeless tobacco. He reports that he does not drink alcohol and does not use drugs.    Medications:  aspirin, atenolol, dicyclomine, docusate sodium, famotidine, fluticasone, omeprazole, triamterene-hydrochlorothiazide, and valACYclovir    Scheduled Meds:[START ON 6/14/2024] alvimopan, 12 mg, Oral, BID  [START ON 6/14/2024] atenolol, 50 mg, Oral, Daily  clindamycin, 900 mg, Intravenous, Q8H   And  aztreonam, 1,000 mg, Intravenous, Q8H  [START ON 6/14/2024] enoxaparin, 40 mg, Subcutaneous, Daily  [START ON 6/14/2024] famotidine, 40 mg, Oral, Daily  [START ON 6/14/2024] triamterene-hydrochlorothiazide, 1 tablet, Oral, Daily      Continuous Infusions:dextrose 5 % and sodium chloride 0.45 % with KCl 20 mEq/L, 100 mL/hr, Last Rate: 100 mL/hr (06/13/24 1635)  Scopolamine, 1 patch      PRN Meds:.  acetaminophen    fluticasone    HYDROcodone-acetaminophen    HYDROmorphone **AND** naloxone    ondansetron    Allergies   Allergen Reactions    Erythromycin GI Intolerance       Objective   Objective     Vital Signs:   Temp:  [97 °F (36.1 °C)-98.3 °F (36.8 °C)] 97.3 °F (36.3 °C)  Heart Rate:  [57-67] 58  Resp:  [16-20] 16  BP: ()/(53-96) 117/73  Flow (L/min):  [2] 2    Physical Exam  Constitutional: No acute distress, awake, alert  HENT: NCAT, mucous membranes moist  Respiratory: Clear to auscultation bilaterally, respiratory effort normal room air 97%  Cardiovascular: RRR, no murmurs, rubs, or gallops  Gastrointestinal: hypoactive  bowel sounds, soft, nontender, nondistended  Musculoskeletal: No bilateral ankle edema  Psychiatric: Appropriate affect, cooperative  Neurologic: Oriented x 3, strength symmetric in all extremities, Cranial Nerves grossly intact to confrontation, speech clear  Skin: No rashes   duran with bloody urine          Result  Review:  I have personally reviewed the results from the time of admission and agree with these findings:  [x]  Laboratory  [x]  Radiology  []  EKG/Telemetry   []  Pathology  []  Old records  []  Other:  Most notable findings include:     LAB RESULTS:      Lab 06/13/24  1336   HEMOGLOBIN 14.2   HEMATOCRIT 41.6         Lab 06/13/24  0816   POTASSIUM 3.9                     Lab 06/13/24  0802   ABO TYPING O   RH TYPING Negative   ANTIBODY SCREEN Negative         Brief Urine Lab Results  (Last result in the past 365 days)        Color   Clarity   Blood   Leuk Est   Nitrite   Protein   CREAT   Urine HCG        11/20/23 1147 Yellow   Clear   Negative   Small (1+)   Negative   Negative                 Microbiology Results (last 10 days)       ** No results found for the last 240 hours. **            Peripheral Block    Result Date: 6/13/2024  Donavon Moreno CRNA     6/13/2024  9:43 AM Peripheral Block Patient reassessed immediately prior to procedure Patient location during procedure: OR Start time: 6/13/2024 9:27 AM Reason for block: at surgeon's request and post-op pain management Performed by CRNA/CAA: Telly Bailey CRNA Preanesthetic Checklist Completed: patient identified, IV checked, site marked, risks and benefits discussed, surgical consent, monitors and equipment checked, pre-op evaluation and timeout performed Prep: Pt Position: supine Sterile barriers:cap, gloves, mask and washed/disinfected hands Prep: ChloraPrep Patient monitoring: blood pressure monitoring, continuous pulse oximetry and EKG Procedure Sedation: yes Performed under: general Guidance:ultrasound guided Images:still images obtained, printed/placed on chart Laterality:Bilateral Block Type:TAP Injection Technique:single-shot Needle Type:short-bevel and echogenic Needle Gauge:20 G Resistance on Injection: none Medications Used: bupivacaine PF (MARCAINE) 0.25 % injection - Injection  60 mL - 6/13/2024 9:27:00 AM dexamethasone sodium phosphate  "injection - Injection  4 mg - 6/13/2024 9:27:00 AM Medications Comment:Block Injection:  LA dose divided between Right and Left block Post Assessment Injection Assessment: negative aspiration for heme, incremental injection and no paresthesia on injection Patient Tolerance:comfortable throughout block Complications:no Additional Notes Subcostal TAPs A high-frequency linear transducer, with sterile cover, was placed sub-xiphoid to identify Linea Alba, right and left Rectus Abdominus Muscles (RK). The transducer was moved either right or left subcostally to identify the RK and the Transverse Abdominus Muscle (BRAR). The insertion site was prepped in sterile fashion and then localized with 2-5 ml of 1% Lidocaine. Using ultrasound-guidance, a 20-gauge B-Monge 4\" Ultraplex 360 non-stimulating echogenic needle was advanced in plane, from medial to lateral, until the tip of the needle was in the fascial plane between the RK and BRAR. 1-3ml of preservative free normal saline was used to hydro-dissect the fascial planes. After the fascial plane was verified, the local anesthetic (LA) was injected. The procedure was repeated on the opposite side for bilateral coverage. Aspiration every 5 ml to prevent intravascular injection. Injection was completed with negative aspiration of blood and negative intravascular injection. Injection pressures were normal with minimal resistance. The subcostal approach to the TAP nerve block ideally anesthetizes the intercostal nerves T6-T9. Mid-Axillary/Lateral TAPs A high-frequency linear transducer, with sterile cover, was placed in the midaxillary line between the ASIS and costal margin. The External Oblique Muscle (EOM), Internal Oblique Muscle (IOM), Transverse Abdominus Muscle (BRAR), and Peritoneum were identified. The insertion site was prepped in sterile fashion and then localized with 2-5 ml of 1% Lidocaine. Using ultrasound-guidance, a 20-gauge B-Monge 4\" Ultraplex 360 non-stimulating " echogenic needle was advanced in plane, from medial to lateral, until the tip of the needle was in the fascial plane between the IOM and BRAR. 1-3ml of preservative free normal saline was used to hydro-dissect the fascial planes. After the fascial plane was verified, the local anesthetic (LA) was injected. The procedure was repeated on the opposite side for bilateral coverage. Aspiration every 5 ml to prevent intravascular injection. Injection was completed with negative aspiration of blood and negative intravascular injection. Injection pressures were normal with minimal resistance. Midaxillary TAPs should reach intercostal nerves T10- T11 and the subcostal nerve T12.  Performed by: Donavon Moreno CRNA     Results for orders placed during the hospital encounter of 05/06/24    Adult Transthoracic Echo Complete W/ Cont if Necessary Per Protocol    Interpretation Summary    Left ventricular systolic function is normal. Calculated left ventricular EF of 51%    Left ventricular diastolic function was normal.    Normal right ventricular systolic function    No significant valvular abnormality    No pericardial effusion    No prior echocardiogram for comparison      Assessment & Plan   Assessment & Plan     Active Hospital Problems    Diagnosis  POA    **S/P colostomy takedown [Z98.890]  Not Applicable      Resolved Hospital Problems   No resolved problems to display.     Lobo Yu is a 58 y.o. male with PMH of GERD, Diverticulitis with colostomy, HTN, kidney stones. Patient was admitted for planned colostomy take down by Dr. Botello on 6/13/24. We were asked to see the patient post op for medical management    Colostomy takedown  -- colostomy takedown with cystoscopy and temp placement of jean-pierre ureteral catheter on 6/13  -- care per surgeon   -- pain control  -- ambulate   -- reg diet   -- duran cath     HTN  -- cont home meds            Thank you for allowing [facility] to provide  consultative care for your patient, we will continue to follow while clinically appropriate.    FARA Mcneill  24              Electronically signed by Donya Douglass APRN at 24 1639          Discharge Summary        Karolyn Tavera MD at 24 0913              [facility] Medicine Services  DISCHARGE SUMMARY    Patient Name: Lobo Yu  : 1965  MRN: [id number]    Date of Admission: 2024  7:04 AM  Date of Discharge:  24  Primary Care Physician: Alissa Tony DO    Consults       Date and Time Order Name Status Description    2024  2:45 PM Inpatient Hospitalist Consult              Hospital Course     Presenting Problem: here for colostomy takedown    Active Hospital Problems   No active problems to display.      Resolved Hospital Problems    Diagnosis Date Resolved POA    **S/P colostomy takedown [Z98.890] 2024 Not Applicable          Hospital Course:  Lobo Yu is a 58 y.o. male with PMH of GERD, perforated diverticulitis s/p sigmoid colectomy with colostomy 2023, HTN, and kidney stones. Patient was admitted for planned colostomy take down by Dr. Botello on 24.       S/P colostomy takedown  Hx of perforated diverticulitis s/p sigmoid colectomy with colostomy 23  --s/p colostomy takedown (Dr. Botello) with cystoscopy and temporary placement of bilateral ureteral catheters (Dr. Hoffmann) on 24  --Pitt catheter removed, voiding spontaneously   --Continue pain/nausea meds as needed - Dr. Botello has sent in Rx for PRN Norco at discharge  --Ambulating well, having bowel movements  --Okay for discharge home today per Dr. Botello      Post-operative anemia  --H&H stable this morning     HTN  --BP well controlled  --Continue home meds (Atenolol, Dyazide)     GERD  --Continue PPI, Pepcid      Discharge Follow Up Recommendations for outpatient labs/diagnostics:  F/U with PCP in 1 week  F/U with Dr. Botello in  2 weeks    Day of Discharge     HPI:   Seen this morning. Doing well. Eager for home.     Review of Systems  Gen-no fevers, no chills  CV-no chest pain, no palpitations  Resp-no cough, no dyspnea  GI-no N/V/D, mild abd pain      Vital Signs:   Temp:  [98.1 °F (36.7 °C)-98.4 °F (36.9 °C)] 98.4 °F (36.9 °C)  Heart Rate:  [53-64] 64  Resp:  [15-18] 18  BP: (108-123)/(71-85) 122/85      Physical Exam:  Gen-no acute distress  HENT-NCAT, mucous membranes moist  CV-RRR, S1 S2 normal, no m/r/g  Resp-CTAB, no wheezes or rales  Abd-soft, NT, mildly distended, +BS  Ext-no edema  Neuro-A&Ox3, no focal deficits  Skin-no rashes  Psych-appropriate mood      Pertinent  and/or Most Recent Results     LAB RESULTS:      Lab 06/16/24  0332 06/15/24  0337 06/14/24  0332 06/13/24  1336   WBC 9.20 13.56* 16.62*  --    HEMOGLOBIN 12.4* 11.8* 13.1 14.2   HEMATOCRIT 36.9* 35.1* 38.2 41.6   PLATELETS 225 209 234  --    NEUTROS ABS  --  9.22* 13.92*  --    IMMATURE GRANS (ABS)  --  0.06* 0.06*  --    LYMPHS ABS  --  2.66 0.92  --    MONOS ABS  --  1.53* 1.71*  --    EOS ABS  --  0.04 0.00  --    MCV 93.2 93.4 92.9  --          Lab 06/16/24  0332 06/15/24  0337 06/14/24  0332 06/13/24  0816   SODIUM 141 140 139  --    POTASSIUM 4.1 3.9 4.4 3.9   CHLORIDE 102 104 102  --    CO2 29.0 28.0 26.0  --    ANION GAP 10.0 8.0 11.0  --    BUN 22* 24* 17  --    CREATININE 1.00 1.00 1.07  --    EGFR 87.8 87.8 80.9  --    GLUCOSE 88 91 173*  --    CALCIUM 8.8 8.9 9.4  --                      Lab 06/13/24  0802   ABO TYPING O   RH TYPING Negative   ANTIBODY SCREEN Negative         Brief Urine Lab Results  (Last result in the past 365 days)        Color   Clarity   Blood   Leuk Est   Nitrite   Protein   CREAT   Urine HCG        11/20/23 1147 Yellow   Clear   Negative   Small (1+)   Negative   Negative                 Microbiology Results (last 10 days)       ** No results found for the last 240 hours. **            Peripheral Block    Result Date:  "6/13/2024  Donavon Moreno CRNA     6/13/2024  9:43 AM Peripheral Block Patient reassessed immediately prior to procedure Patient location during procedure: OR Start time: 6/13/2024 9:27 AM Reason for block: at surgeon's request and post-op pain management Performed by CRNA/CAA: Telly Bailey CRNA Preanesthetic Checklist Completed: patient identified, IV checked, site marked, risks and benefits discussed, surgical consent, monitors and equipment checked, pre-op evaluation and timeout performed Prep: Pt Position: supine Sterile barriers:cap, gloves, mask and washed/disinfected hands Prep: ChloraPrep Patient monitoring: blood pressure monitoring, continuous pulse oximetry and EKG Procedure Sedation: yes Performed under: general Guidance:ultrasound guided Images:still images obtained, printed/placed on chart Laterality:Bilateral Block Type:TAP Injection Technique:single-shot Needle Type:short-bevel and echogenic Needle Gauge:20 G Resistance on Injection: none Medications Used: bupivacaine PF (MARCAINE) 0.25 % injection - Injection  60 mL - 6/13/2024 9:27:00 AM dexamethasone sodium phosphate injection - Injection  4 mg - 6/13/2024 9:27:00 AM Medications Comment:Block Injection:  LA dose divided between Right and Left block Post Assessment Injection Assessment: negative aspiration for heme, incremental injection and no paresthesia on injection Patient Tolerance:comfortable throughout block Complications:no Additional Notes Subcostal TAPs A high-frequency linear transducer, with sterile cover, was placed sub-xiphoid to identify Linea Alba, right and left Rectus Abdominus Muscles (RK). The transducer was moved either right or left subcostally to identify the RK and the Transverse Abdominus Muscle (BRAR). The insertion site was prepped in sterile fashion and then localized with 2-5 ml of 1% Lidocaine. Using ultrasound-guidance, a 20-gauge B-Monge 4\" Ultraplex 360 non-stimulating echogenic needle was advanced in plane, " "from medial to lateral, until the tip of the needle was in the fascial plane between the RK and BRAR. 1-3ml of preservative free normal saline was used to hydro-dissect the fascial planes. After the fascial plane was verified, the local anesthetic (LA) was injected. The procedure was repeated on the opposite side for bilateral coverage. Aspiration every 5 ml to prevent intravascular injection. Injection was completed with negative aspiration of blood and negative intravascular injection. Injection pressures were normal with minimal resistance. The subcostal approach to the TAP nerve block ideally anesthetizes the intercostal nerves T6-T9. Mid-Axillary/Lateral TAPs A high-frequency linear transducer, with sterile cover, was placed in the midaxillary line between the ASIS and costal margin. The External Oblique Muscle (EOM), Internal Oblique Muscle (IOM), Transverse Abdominus Muscle (BRAR), and Peritoneum were identified. The insertion site was prepped in sterile fashion and then localized with 2-5 ml of 1% Lidocaine. Using ultrasound-guidance, a 20-gauge B-Monge 4\" Ultraplex 360 non-stimulating echogenic needle was advanced in plane, from medial to lateral, until the tip of the needle was in the fascial plane between the IOM and BRAR. 1-3ml of preservative free normal saline was used to hydro-dissect the fascial planes. After the fascial plane was verified, the local anesthetic (LA) was injected. The procedure was repeated on the opposite side for bilateral coverage. Aspiration every 5 ml to prevent intravascular injection. Injection was completed with negative aspiration of blood and negative intravascular injection. Injection pressures were normal with minimal resistance. Midaxillary TAPs should reach intercostal nerves T10- T11 and the subcostal nerve T12.  Performed by: Donavon Moreno CRNA             Results for orders placed during the hospital encounter of 05/06/24    Adult Transthoracic Echo Complete W/ Cont if " Necessary Per Protocol    Interpretation Summary    Left ventricular systolic function is normal. Calculated left ventricular EF of 51%    Left ventricular diastolic function was normal.    Normal right ventricular systolic function    No significant valvular abnormality    No pericardial effusion    No prior echocardiogram for comparison      Discharge Details        Discharge Medications        New Medications        Instructions Start Date   HYDROcodone-acetaminophen 5-325 MG per tablet  Commonly known as: Norco   1 tablet, Oral, Every 6 Hours PRN, Do not drive if taking medication. Take only for postoperative pain.      HYDROcodone-acetaminophen 5-325 MG per tablet  Commonly known as: Norco   1 tablet, Oral, Every 6 Hours PRN, Take only for postoperative pain. Do not drive if taking medication             Continue These Medications        Instructions Start Date   aspirin 81 MG EC tablet   81 mg, Oral, Daily, OTC      atenolol 50 MG tablet  Commonly known as: TENORMIN   50 mg, Oral, Daily      dicyclomine 10 MG capsule  Commonly known as: BENTYL   20 mg, Oral, 3 Times Daily PRN      docusate sodium 100 MG capsule   100 mg, Oral, Daily      famotidine 40 MG tablet  Commonly known as: PEPCID   40 mg, Oral, Daily      fluticasone 50 MCG/ACT nasal spray  Commonly known as: FLONASE   2 sprays, Each Nare, Daily PRN, OTC      omeprazole 40 MG capsule  Commonly known as: priLOSEC   40 mg, Oral, Daily      triamterene-hydrochlorothiazide 37.5-25 MG per capsule  Commonly known as: DYAZIDE   1 capsule, Oral, Daily      valACYclovir 1000 MG tablet  Commonly known as: VALTREX   1,000 mg, Oral, 2 Times Daily               Allergies   Allergen Reactions    Erythromycin GI Intolerance         Discharge Disposition:  Home or Self Care    Diet:  Hospital:  Diet Order   Procedures    Diet: Regular/House; Fluid Consistency: Thin (IDDSI 0)       Diet Instructions       Diet: Regular/House Diet; Regular (IDDSI 7); Thin (IDDSI 0)       Discharge Diet: Regular/House Diet    Texture: Regular (IDDSI 7)    Fluid Consistency: Thin (IDDSI 0)             Activity:  Activity Instructions       Activity as Tolerated                   CODE STATUS:    Code Status and Medical Interventions:   Ordered at: 06/13/24 1445     Level Of Support Discussed With:    Patient     Code Status (Patient has no pulse and is not breathing):    CPR (Attempt to Resuscitate)     Medical Interventions (Patient has pulse or is breathing):    Full Support       Future Appointments   Date Time Provider Department Center   6/24/2024 10:15 AM Alissa Tony DO [facility]  [facility]       Additional Instructions for the Follow-ups that You Need to Schedule       Discharge Follow-up with PCP   As directed       Currently Documented PCP:    Alissa Tony DO    PCP Phone Number:    [phone]     Follow Up Details: 1 week        Discharge Follow-up with Specialty: Dr. Daniele Botello; 2 Weeks   As directed      Specialty: Dr. Daniele Botello   Follow Up: 2 Weeks                      Karolyn Tavera MD  06/16/24      Time Spent on Discharge:  I spent  15  minutes on this discharge activity which included: face-to-face encounter with the patient, reviewing the data in the system, coordination of the care with the nursing staff as well as consultants, documentation, and entering orders.            Electronically signed by Karolyn Tavera MD at 06/16/24 0915       Discharge Order (From admission, onward)       Start     Ordered    06/16/24 0807  Discharge patient  Once        Expected Discharge Date: 06/16/24   Discharge Disposition: Home or Self Care   Physician of Record for Attribution - Please select from Treatment Team: DANIELE BOTELLO [[id number]]   Review needed by CMO to determine Physician of Record: No      Question Answer Comment   Physician of Record for Attribution - Please select from Treatment Team DANIELE BOTELLO    Review needed by CMO to determine Physician of Record No        06/16/24  0893

## 2024-06-24 ENCOUNTER — OFFICE VISIT (OUTPATIENT)
Dept: FAMILY MEDICINE CLINIC | Facility: CLINIC | Age: 59
End: 2024-06-24
Payer: COMMERCIAL

## 2024-06-24 VITALS
DIASTOLIC BLOOD PRESSURE: 82 MMHG | HEART RATE: 63 BPM | WEIGHT: 204.6 LBS | TEMPERATURE: 98.6 F | OXYGEN SATURATION: 96 % | HEIGHT: 71 IN | BODY MASS INDEX: 28.64 KG/M2 | SYSTOLIC BLOOD PRESSURE: 119 MMHG

## 2024-06-24 DIAGNOSIS — I10 ESSENTIAL HYPERTENSION: ICD-10-CM

## 2024-06-24 DIAGNOSIS — N52.9 ERECTILE DYSFUNCTION, UNSPECIFIED ERECTILE DYSFUNCTION TYPE: Primary | ICD-10-CM

## 2024-06-24 DIAGNOSIS — K57.80 PERFORATED DIVERTICULUM OF INTESTINE: ICD-10-CM

## 2024-06-24 PROCEDURE — 99214 OFFICE O/P EST MOD 30 MIN: CPT | Performed by: FAMILY MEDICINE

## 2024-06-24 RX ORDER — TRIAMTERENE AND HYDROCHLOROTHIAZIDE 37.5; 25 MG/1; MG/1
1 CAPSULE ORAL DAILY
Qty: 90 CAPSULE | Refills: 1 | Status: SHIPPED | OUTPATIENT
Start: 2024-06-24

## 2024-06-24 RX ORDER — FLUTICASONE PROPIONATE 50 MCG
2 SPRAY, SUSPENSION (ML) NASAL DAILY PRN
Qty: 15.8 ML | Refills: 2 | Status: SHIPPED | OUTPATIENT
Start: 2024-06-24

## 2024-06-24 RX ORDER — SILDENAFIL 50 MG/1
50 TABLET, FILM COATED ORAL DAILY PRN
Qty: 90 TABLET | Refills: 1 | Status: SHIPPED | OUTPATIENT
Start: 2024-06-24

## 2024-06-24 RX ORDER — SILDENAFIL 50 MG/1
50 TABLET, FILM COATED ORAL DAILY PRN
COMMUNITY
End: 2024-06-24 | Stop reason: SDUPTHER

## 2024-06-24 RX ORDER — ATENOLOL 50 MG/1
50 TABLET ORAL DAILY
Qty: 90 TABLET | Refills: 1 | Status: SHIPPED | OUTPATIENT
Start: 2024-06-24

## 2024-06-24 NOTE — ASSESSMENT & PLAN NOTE
- s/p take down of colostomy, healing well, ensure to keep appointment with surgery later this week

## 2024-06-24 NOTE — PROGRESS NOTES
Subjective     Chief Complaint  Hypertension (Patient here for 3 month follow up) and Med Refill (Flonase nasal spray)    Subjective          Lobo Yu is a 58 y.o. male who presents today to [facility] for follow up.    HPI:   Hypertension          Mr. Yu is a very pleasant 58-year-old male who with past medical history of hypertension who presents today to follow-up.      Initial presentation -- Has a history of colon polyps as well presented with increased abdominal pain after being treated for diverticulitis multiple times including while on vacation in [place].  He ultimately reported to the emergency room was found to have sigmoid diverticulitis With renato perforation and pneumoperitoneum.  Underwent a exploratory laparotomy with sigmoid colectomy with end colostomy and abscess drainage with Dr. Vallecillo.  He was evaluated by infectious disease while inpatient as well and discharged home with a PICC line with IV events for 14 days.  He has an appointment with Dr. Larios on 12/4.   -- he underwent colostomy takedown on 6/13/24 - he is healing okay and has scheduled follow up with surgery later this week.     His blood pressure is very well-controlled at 119/82  Current prescription includes triamterene-HCTZ 37.5 mg / 25 mg daily, atenolol 50 mg daily    Echo from 3/25/24 -  no evidenced of pulmonary hypertension.     The following portions of the patient's history were reviewed and updated as appropriate: allergies, current medications, past family history, past medical history, past social history, past surgical history and problem list.    Objective     Objective     Allergy:   Allergies   Allergen Reactions    Erythromycin GI Intolerance        Current Medications:   Current Outpatient Medications   Medication Sig Dispense Refill    aspirin 81 MG EC tablet Take 1 tablet by mouth Daily. OTC      atenolol (TENORMIN) 50 MG tablet Take 1 tablet by mouth  Daily. Indications: High Blood Pressure Disorder 90 tablet 1    famotidine (PEPCID) 40 MG tablet Take 1 tablet by mouth Daily. Indications: Gastroesophageal Reflux Disease 90 tablet 1    fluticasone (FLONASE) 50 MCG/ACT nasal spray 2 sprays by Each Nare route Daily As Needed for Allergies or Rhinitis. OTC  Indications: Stuffy Nose      omeprazole (priLOSEC) 40 MG capsule Take 1 capsule by mouth Daily. Indications: Gastroesophageal Reflux Disease (Patient taking differently: Take 1 capsule by mouth Daily.) 90 capsule 1    sildenafil (VIAGRA) 50 MG tablet Take 1 tablet by mouth Daily As Needed for Erectile Dysfunction. 90 tablet 1    triamterene-hydrochlorothiazide (DYAZIDE) 37.5-25 MG per capsule Take 1 capsule by mouth Daily. Indications: High Blood Pressure Disorder 90 capsule 1    valACYclovir (VALTREX) 1000 MG tablet TAKE 1 TABLET BY MOUTH TWICE A  tablet 3     No current facility-administered medications for this visit.       Past Medical History:  Past Medical History:   Diagnosis Date    Diverticulitis     GERD (gastroesophageal reflux disease)     Hypertension     Kidney stones     X2 EPISODES    Wears glasses        Past Surgical History:  Past Surgical History:   Procedure Laterality Date    APPENDECTOMY      COLONOSCOPY      COLOSTOMY CLOSURE N/A 6/13/2024    Procedure: OPEN COLOSTOMY TAKEDOWN;  Surgeon: Abad Botello MD;  Location:  [facility];  Service: General;  Laterality: N/A;    CYSTOSCOPY Bilateral 6/13/2024    Procedure: CYSTOSCOPY TEMPORARY PLACEMENT BILATERAL URETERAL CATHETER;  Surgeon: Mitchell Hoffmann MD;  Location:  [facility];  Service: Urology;  Laterality: Bilateral;    EXPLORATORY LAPAROTOMY N/A 11/20/2023    Procedure: EXPLORATORY LAPAROTOMY, SIGMOID COLECTOMY WITH COLOSTOMY CREAION, INTRA-ABDOMINAL ABSCESS DRAINAGE, APPENDECTOMY;  Surgeon: Hitesh Vallecillo MD;  Location:  [facility];  Service: General;  Laterality: N/A;    NEPHROLITHOTOMY      TONSILLECTOMY      WISDOM TOOTH  "EXTRACTION         Social History:  Social History     Socioeconomic History    Marital status:    Tobacco Use    Smoking status: Former     Current packs/day: 0.50     Average packs/day: 0.5 packs/day for 30.5 years (15.2 ttl pk-yrs)     Types: Cigarettes     Start date: 1/1/1994     Quit date: 1/11/1981     Passive exposure: Past    Smokeless tobacco: Never   Vaping Use    Vaping status: Never Used   Substance and Sexual Activity    Alcohol use: Never    Drug use: Never    Sexual activity: Defer       Family History:  History reviewed. No pertinent family history.      Vital Signs:   /82   Pulse 63   Temp 98.6 °F (37 °C) (Infrared)   Ht 180.3 cm (70.98\")   Wt 92.8 kg (204 lb 9.6 oz)   SpO2 96%   BMI 28.55 kg/m²      Physical Exam:  Physical Exam  Constitutional:       Appearance: He is not ill-appearing.   Eyes:      Pupils: Pupils are equal, round, and reactive to light.   Cardiovascular:      Rate and Rhythm: Normal rate.      Pulses: Normal pulses.   Pulmonary:      Effort: Pulmonary effort is normal.      Breath sounds: Normal breath sounds.   Abdominal:      Comments: Colostomy present - slightly irration of the stoma     Skin:     Comments: Abdominal incisions clean and dry, staples intact    Neurological:      General: No focal deficit present.      Mental Status: He is alert. Mental status is at baseline.   Psychiatric:         Mood and Affect: Mood normal.         Thought Content: Thought content normal.               PHQ-9 Score  PHQ-9 Total Score: 0     Lab Review  Admission on 06/13/2024, Discharged on 06/16/2024   Component Date Value Ref Range Status    Potassium 06/13/2024 3.9  3.5 - 5.2 mmol/L Final    Slight hemolysis detected by analyzer. Result may be falsely elevated.    ABO Type 06/13/2024 O   Final    RH type 06/13/2024 Negative   Final    Antibody Screen 06/13/2024 Negative   Final    T&S Expiration Date 06/13/2024 6/16/2024 11:59:59 PM   Final    Case Report 06/13/2024    " Final                    Value:Surgical Pathology Report                         Case: [id number]                                  Authorizing Provider:  Abad Botello MD        Collected:           06/13/2024 10:40 AM          Ordering Location:     [facility]   Received:            06/13/2024 12:30 PM                                 OR                                                                           Pathologist:           Obey Graff MD                                                        Specimen:    Large Intestine, Sigmoid Colon, SIGMOID COLON, STOMA, AND DONUTS - FOR PERMANENT           Clinical Information 06/13/2024    Final                    Value:This result contains rich text formatting which cannot be displayed here.    Final Diagnosis 06/13/2024    Final                    Value:This result contains rich text formatting which cannot be displayed here.    Gross Description 06/13/2024    Final                    Value:This result contains rich text formatting which cannot be displayed here.    Microscopic Description 06/13/2024    Final                    Value:This result contains rich text formatting which cannot be displayed here.    Hemoglobin 06/13/2024 14.2  13.0 - 17.7 g/dL Final    Hematocrit 06/13/2024 41.6  37.5 - 51.0 % Final    Glucose 06/14/2024 173 (H)  65 - 99 mg/dL Final    BUN 06/14/2024 17  6 - 20 mg/dL Final    Creatinine 06/14/2024 1.07  0.76 - 1.27 mg/dL Final    Sodium 06/14/2024 139  136 - 145 mmol/L Final    Potassium 06/14/2024 4.4  3.5 - 5.2 mmol/L Final    Chloride 06/14/2024 102  98 - 107 mmol/L Final    CO2 06/14/2024 26.0  22.0 - 29.0 mmol/L Final    Calcium 06/14/2024 9.4  8.6 - 10.5 mg/dL Final    BUN/Creatinine Ratio 06/14/2024 15.9  7.0 - 25.0 Final    Anion Gap 06/14/2024 11.0  5.0 - 15.0 mmol/L Final    eGFR 06/14/2024 80.9  >60.0 mL/min/1.73 Final    WBC 06/14/2024 16.62 (H)  3.40 - 10.80 10*3/mm3 Final    RBC 06/14/2024 4.11 (L)   4.14 - 5.80 10*6/mm3 Final    Hemoglobin 06/14/2024 13.1  13.0 - 17.7 g/dL Final    Hematocrit 06/14/2024 38.2  37.5 - 51.0 % Final    MCV 06/14/2024 92.9  79.0 - 97.0 fL Final    MCH 06/14/2024 31.9  26.6 - 33.0 pg Final    MCHC 06/14/2024 34.3  31.5 - 35.7 g/dL Final    RDW 06/14/2024 12.7  12.3 - 15.4 % Final    RDW-SD 06/14/2024 43.4  37.0 - 54.0 fl Final    MPV 06/14/2024 10.0  6.0 - 12.0 fL Final    Platelets 06/14/2024 234  140 - 450 10*3/mm3 Final    Neutrophil % 06/14/2024 83.7 (H)  42.7 - 76.0 % Final    Lymphocyte % 06/14/2024 5.5 (L)  19.6 - 45.3 % Final    Monocyte % 06/14/2024 10.3  5.0 - 12.0 % Final    Eosinophil % 06/14/2024 0.0 (L)  0.3 - 6.2 % Final    Basophil % 06/14/2024 0.1  0.0 - 1.5 % Final    Immature Grans % 06/14/2024 0.4  0.0 - 0.5 % Final    Neutrophils, Absolute 06/14/2024 13.92 (H)  1.70 - 7.00 10*3/mm3 Final    Lymphocytes, Absolute 06/14/2024 0.92  0.70 - 3.10 10*3/mm3 Final    Monocytes, Absolute 06/14/2024 1.71 (H)  0.10 - 0.90 10*3/mm3 Final    Eosinophils, Absolute 06/14/2024 0.00  0.00 - 0.40 10*3/mm3 Final    Basophils, Absolute 06/14/2024 0.01  0.00 - 0.20 10*3/mm3 Final    Immature Grans, Absolute 06/14/2024 0.06 (H)  0.00 - 0.05 10*3/mm3 Final    nRBC 06/14/2024 0.0  0.0 - 0.2 /100 WBC Final    Glucose 06/15/2024 91  65 - 99 mg/dL Final    BUN 06/15/2024 24 (H)  6 - 20 mg/dL Final    Creatinine 06/15/2024 1.00  0.76 - 1.27 mg/dL Final    Sodium 06/15/2024 140  136 - 145 mmol/L Final    Potassium 06/15/2024 3.9  3.5 - 5.2 mmol/L Final    Chloride 06/15/2024 104  98 - 107 mmol/L Final    CO2 06/15/2024 28.0  22.0 - 29.0 mmol/L Final    Calcium 06/15/2024 8.9  8.6 - 10.5 mg/dL Final    BUN/Creatinine Ratio 06/15/2024 24.0  7.0 - 25.0 Final    Anion Gap 06/15/2024 8.0  5.0 - 15.0 mmol/L Final    eGFR 06/15/2024 87.8  >60.0 mL/min/1.73 Final    WBC 06/15/2024 13.56 (H)  3.40 - 10.80 10*3/mm3 Final    RBC 06/15/2024 3.76 (L)  4.14 - 5.80 10*6/mm3 Final    Hemoglobin 06/15/2024  11.8 (L)  13.0 - 17.7 g/dL Final    Hematocrit 06/15/2024 35.1 (L)  37.5 - 51.0 % Final    MCV 06/15/2024 93.4  79.0 - 97.0 fL Final    MCH 06/15/2024 31.4  26.6 - 33.0 pg Final    MCHC 06/15/2024 33.6  31.5 - 35.7 g/dL Final    RDW 06/15/2024 13.2  12.3 - 15.4 % Final    RDW-SD 06/15/2024 45.3  37.0 - 54.0 fl Final    MPV 06/15/2024 9.7  6.0 - 12.0 fL Final    Platelets 06/15/2024 209  140 - 450 10*3/mm3 Final    Neutrophil % 06/15/2024 68.0  42.7 - 76.0 % Final    Lymphocyte % 06/15/2024 19.6  19.6 - 45.3 % Final    Monocyte % 06/15/2024 11.3  5.0 - 12.0 % Final    Eosinophil % 06/15/2024 0.3  0.3 - 6.2 % Final    Basophil % 06/15/2024 0.4  0.0 - 1.5 % Final    Immature Grans % 06/15/2024 0.4  0.0 - 0.5 % Final    Neutrophils, Absolute 06/15/2024 9.22 (H)  1.70 - 7.00 10*3/mm3 Final    Lymphocytes, Absolute 06/15/2024 2.66  0.70 - 3.10 10*3/mm3 Final    Monocytes, Absolute 06/15/2024 1.53 (H)  0.10 - 0.90 10*3/mm3 Final    Eosinophils, Absolute 06/15/2024 0.04  0.00 - 0.40 10*3/mm3 Final    Basophils, Absolute 06/15/2024 0.05  0.00 - 0.20 10*3/mm3 Final    Immature Grans, Absolute 06/15/2024 0.06 (H)  0.00 - 0.05 10*3/mm3 Final    nRBC 06/15/2024 0.0  0.0 - 0.2 /100 WBC Final    Glucose 06/16/2024 88  65 - 99 mg/dL Final    BUN 06/16/2024 22 (H)  6 - 20 mg/dL Final    Creatinine 06/16/2024 1.00  0.76 - 1.27 mg/dL Final    Sodium 06/16/2024 141  136 - 145 mmol/L Final    Potassium 06/16/2024 4.1  3.5 - 5.2 mmol/L Final    Chloride 06/16/2024 102  98 - 107 mmol/L Final    CO2 06/16/2024 29.0  22.0 - 29.0 mmol/L Final    Calcium 06/16/2024 8.8  8.6 - 10.5 mg/dL Final    BUN/Creatinine Ratio 06/16/2024 22.0  7.0 - 25.0 Final    Anion Gap 06/16/2024 10.0  5.0 - 15.0 mmol/L Final    eGFR 06/16/2024 87.8  >60.0 mL/min/1.73 Final    WBC 06/16/2024 9.20  3.40 - 10.80 10*3/mm3 Final    RBC 06/16/2024 3.96 (L)  4.14 - 5.80 10*6/mm3 Final    Hemoglobin 06/16/2024 12.4 (L)  13.0 - 17.7 g/dL Final    Hematocrit 06/16/2024 36.9  (L)  37.5 - 51.0 % Final    MCV 06/16/2024 93.2  79.0 - 97.0 fL Final    MCH 06/16/2024 31.3  26.6 - 33.0 pg Final    MCHC 06/16/2024 33.6  31.5 - 35.7 g/dL Final    RDW 06/16/2024 13.0  12.3 - 15.4 % Final    RDW-SD 06/16/2024 44.9  37.0 - 54.0 fl Final    MPV 06/16/2024 9.6  6.0 - 12.0 fL Final    Platelets 06/16/2024 225  140 - 450 10*3/mm3 Final   Pre-Admission Testing on 05/31/2024   Component Date Value Ref Range Status    QT Interval 05/31/2024 430  ms Final    QTC Interval 05/31/2024 388  ms Final    Glucose 05/31/2024 114 (H)  65 - 99 mg/dL Final    BUN 05/31/2024 13  6 - 20 mg/dL Final    Creatinine 05/31/2024 0.83  0.76 - 1.27 mg/dL Final    Sodium 05/31/2024 143  136 - 145 mmol/L Final    Potassium 05/31/2024 4.9  3.5 - 5.2 mmol/L Final    Chloride 05/31/2024 104  98 - 107 mmol/L Final    CO2 05/31/2024 29.0  22.0 - 29.0 mmol/L Final    Calcium 05/31/2024 9.1  8.6 - 10.5 mg/dL Final    BUN/Creatinine Ratio 05/31/2024 15.7  7.0 - 25.0 Final    Anion Gap 05/31/2024 10.0  5.0 - 15.0 mmol/L Final    eGFR 05/31/2024 102.1  >60.0 mL/min/1.73 Final    WBC 05/31/2024 5.38  3.40 - 10.80 10*3/mm3 Final    RBC 05/31/2024 4.62  4.14 - 5.80 10*6/mm3 Final    Hemoglobin 05/31/2024 14.7  13.0 - 17.7 g/dL Final    Hematocrit 05/31/2024 43.8  37.5 - 51.0 % Final    MCV 05/31/2024 94.8  79.0 - 97.0 fL Final    MCH 05/31/2024 31.8  26.6 - 33.0 pg Final    MCHC 05/31/2024 33.6  31.5 - 35.7 g/dL Final    RDW 05/31/2024 13.2  12.3 - 15.4 % Final    RDW-SD 05/31/2024 46.0  37.0 - 54.0 fl Final    MPV 05/31/2024 9.6  6.0 - 12.0 fL Final    Platelets 05/31/2024 218  140 - 450 10*3/mm3 Final    Hemoglobin A1C 05/31/2024 5.40  4.80 - 5.60 % Final   Hospital Outpatient Visit on 05/06/2024   Component Date Value Ref Range Status    IVSd 05/06/2024 1.20  cm Final    LVPWd 05/06/2024 1.11  cm Final    IVS/LVPW 05/06/2024 1.08  cm Final    MV E max kinsey 05/06/2024 56.0  cm/sec Final    MV A max kinsey 05/06/2024 40.0  cm/sec Final    MV  E/A 05/06/2024 1.40   Final    TR max kinsey 05/06/2024 189.7  cm/sec Final    LA dimension (2D)  05/06/2024 3.2  cm Final    LV V1 max 05/06/2024 91.0  cm/sec Final    LV V1 max PG 05/06/2024 3.3  mmHg Final    LV V1 mean PG 05/06/2024 1.70  mmHg Final    LV V1 VTI 05/06/2024 20.0  cm Final    Ao pk kinsey 05/06/2024 103.0  cm/sec Final    Ao max PG 05/06/2024 4.2  mmHg Final    Ao mean PG 05/06/2024 2.40  mmHg Final    Ao V2 VTI 05/06/2024 22.3  cm Final    TR max PG 05/06/2024 14.4  mmHg Final    PA V2 max 05/06/2024 61.0  cm/sec Final    PA acc time 05/06/2024 0.16  sec Final    EF(MOD-bp) 05/06/2024 51.0  % Final    LV GLOBAL STRAIN  05/06/2024 -19.0  % Final    LVIDd 05/06/2024 4.8  cm Final    LVIDs 05/06/2024 3.6  cm Final    RV Base 05/06/2024 2.96  cm Final    RV Mid 05/06/2024 3.11  cm Final    RV Length 05/06/2024 6.39  cm Final    TAPSE (>1.6) 05/06/2024 1.86  cm Final    RVSP(TR) 05/06/2024 17  mmHg Final    RAP systole 05/06/2024 3  mmHg Final   Lab on 03/25/2024   Component Date Value Ref Range Status    Hepatitis C Ab 03/25/2024 Non-Reactive  Non-Reactive Final    Glucose 03/25/2024 96  65 - 99 mg/dL Final    BUN 03/25/2024 15  6 - 20 mg/dL Final    Creatinine 03/25/2024 1.12  0.76 - 1.27 mg/dL Final    Sodium 03/25/2024 140  136 - 145 mmol/L Final    Potassium 03/25/2024 4.0  3.5 - 5.2 mmol/L Final    Chloride 03/25/2024 102  98 - 107 mmol/L Final    CO2 03/25/2024 27.0  22.0 - 29.0 mmol/L Final    Calcium 03/25/2024 9.3  8.6 - 10.5 mg/dL Final    Total Protein 03/25/2024 7.1  6.0 - 8.5 g/dL Final    Albumin 03/25/2024 4.5  3.5 - 5.2 g/dL Final    ALT (SGPT) 03/25/2024 36  1 - 41 U/L Final    AST (SGOT) 03/25/2024 25  1 - 40 U/L Final    Alkaline Phosphatase 03/25/2024 67  39 - 117 U/L Final    Total Bilirubin 03/25/2024 0.5  0.0 - 1.2 mg/dL Final    Globulin 03/25/2024 2.6  gm/dL Final    A/G Ratio 03/25/2024 1.7  g/dL Final    BUN/Creatinine Ratio 03/25/2024 13.4  7.0 - 25.0 Final    Anion Gap  03/25/2024 11.0  5.0 - 15.0 mmol/L Final    eGFR 03/25/2024 76.6  >60.0 mL/min/1.73 Final    Hemoglobin A1C 03/25/2024 5.50  4.80 - 5.60 % Final    Total Cholesterol 03/25/2024 185  0 - 200 mg/dL Final    Triglycerides 03/25/2024 99  0 - 150 mg/dL Final    HDL Cholesterol 03/25/2024 43  40 - 60 mg/dL Final    LDL Cholesterol  03/25/2024 124 (H)  0 - 100 mg/dL Final    VLDL Cholesterol 03/25/2024 18  5 - 40 mg/dL Final    LDL/HDL Ratio 03/25/2024 2.84   Final    TSH 03/25/2024 3.220  0.270 - 4.200 uIU/mL Final    Vitamin B-12 03/25/2024 348  211 - 946 pg/mL Final    25 Hydroxy, Vitamin D 03/25/2024 23.8 (L)  30.0 - 100.0 ng/ml Final    PSA 03/25/2024 1.460  0.000 - 4.000 ng/mL Final    WBC 03/25/2024 9.50  3.40 - 10.80 10*3/mm3 Final    RBC 03/25/2024 4.91  4.14 - 5.80 10*6/mm3 Final    Hemoglobin 03/25/2024 14.9  13.0 - 17.7 g/dL Final    Hematocrit 03/25/2024 44.3  37.5 - 51.0 % Final    MCV 03/25/2024 90.2  79.0 - 97.0 fL Final    MCH 03/25/2024 30.3  26.6 - 33.0 pg Final    MCHC 03/25/2024 33.6  31.5 - 35.7 g/dL Final    RDW 03/25/2024 13.0  12.3 - 15.4 % Final    RDW-SD 03/25/2024 42.4  37.0 - 54.0 fl Final    MPV 03/25/2024 10.0  6.0 - 12.0 fL Final    Platelets 03/25/2024 293  140 - 450 10*3/mm3 Final    Neutrophil % 03/25/2024 61.9  42.7 - 76.0 % Final    Lymphocyte % 03/25/2024 25.5  19.6 - 45.3 % Final    Monocyte % 03/25/2024 10.0  5.0 - 12.0 % Final    Eosinophil % 03/25/2024 1.3  0.3 - 6.2 % Final    Basophil % 03/25/2024 0.9  0.0 - 1.5 % Final    Immature Grans % 03/25/2024 0.4  0.0 - 0.5 % Final    Neutrophils, Absolute 03/25/2024 5.88  1.70 - 7.00 10*3/mm3 Final    Lymphocytes, Absolute 03/25/2024 2.42  0.70 - 3.10 10*3/mm3 Final    Monocytes, Absolute 03/25/2024 0.95 (H)  0.10 - 0.90 10*3/mm3 Final    Eosinophils, Absolute 03/25/2024 0.12  0.00 - 0.40 10*3/mm3 Final    Basophils, Absolute 03/25/2024 0.09  0.00 - 0.20 10*3/mm3 Final    Immature Grans, Absolute 03/25/2024 0.04  0.00 - 0.05 10*3/mm3  Final    nRBC 03/25/2024 0.0  0.0 - 0.2 /100 WBC Final        Radiology Results  XR Chest PA & Lateral    Result Date: 6/1/2024  Impression: Impression: No acute cardiopulmonary abnormality. Electronically Signed: Javier Vargas MD  6/1/2024 4:07 PM EDT  Workstation ID: [id number]      Assessment / Plan         Assessment and Plan   Diagnoses and all orders for this visit:    1. Erectile dysfunction, unspecified erectile dysfunction type (Primary)  Assessment & Plan:  Refill sent     Orders:  -     sildenafil (VIAGRA) 50 MG tablet; Take 1 tablet by mouth Daily As Needed for Erectile Dysfunction.  Dispense: 90 tablet; Refill: 1    2. Essential hypertension  Assessment & Plan:  Hypertension is improving with treatment.  Continue current treatment regimen.  Blood pressure will be reassessed at the next regular appointment.    Advise that their target blood pressure readings are an average of <140/90.  Advise to follow a low salt diet.       Again, no evidence of pulmonary hypertension.     Orders:  -     triamterene-hydrochlorothiazide (DYAZIDE) 37.5-25 MG per capsule; Take 1 capsule by mouth Daily. Indications: High Blood Pressure Disorder  Dispense: 90 capsule; Refill: 1  -     atenolol (TENORMIN) 50 MG tablet; Take 1 tablet by mouth Daily. Indications: High Blood Pressure Disorder  Dispense: 90 tablet; Refill: 1    3. Perforated diverticulum of intestine  Assessment & Plan:  - s/p take down of colostomy, healing well, ensure to keep appointment with surgery later this week               Discussed possible differential diagnoses, testing, treatment, recommended non-pharmacological interventions, risks, warning signs to monitor for that would indicate need for follow-up in clinic or ER. If no improvement with these regimens or you have new or worsening symptoms follow-up. Patient verbalizes understanding and agreement with plan of care. Denies further needs or concerns.     Patient was given instructions and counseling  regarding her condition and for health maintenance advised.    BMI is >= 25 and <30. (Overweight) The following options were offered after discussion;: weight loss educational material (shared in after visit summary)             Health Maintenance  Health Maintenance:   Health Maintenance Due   Topic Date Due    ZOSTER VACCINE (1 of 2) Never done    COVID-19 Vaccine (2 - 2023-24 season) 09/01/2023        Meds ordered during this visit  New Medications Ordered This Visit   Medications    triamterene-hydrochlorothiazide (DYAZIDE) 37.5-25 MG per capsule     Sig: Take 1 capsule by mouth Daily. Indications: High Blood Pressure Disorder     Dispense:  90 capsule     Refill:  1    atenolol (TENORMIN) 50 MG tablet     Sig: Take 1 tablet by mouth Daily. Indications: High Blood Pressure Disorder     Dispense:  90 tablet     Refill:  1    sildenafil (VIAGRA) 50 MG tablet     Sig: Take 1 tablet by mouth Daily As Needed for Erectile Dysfunction.     Dispense:  90 tablet     Refill:  1       Meds stopped during this visit:  Medications Discontinued During This Encounter   Medication Reason    HYDROcodone-acetaminophen (Norco) 5-325 MG per tablet *Therapy completed    HYDROcodone-acetaminophen (Norco) 5-325 MG per tablet *Therapy completed    docusate sodium 100 MG capsule Patient Reported Not Taking    dicyclomine (BENTYL) 10 MG capsule     atenolol (TENORMIN) 50 MG tablet Reorder    triamterene-hydrochlorothiazide (DYAZIDE) 37.5-25 MG per capsule Reorder    sildenafil (VIAGRA) 50 MG tablet Reorder        Visit Diagnoses    ICD-10-CM ICD-9-CM   1. Erectile dysfunction, unspecified erectile dysfunction type  N52.9 607.84   2. Essential hypertension  I10 401.9   3. Perforated diverticulum of intestine  K57.80 562.10           Patient was given instructions and counseling regarding his condition or for health maintenance advice. Please see specific information pulled into the AVS if appropriate.     Follow Up   Return in about 6  months (around 12/24/2024) for followup HTN .          This document has been electronically signed by Alissa Tony DO   June 24, 2024 10:31 EDT    Dictated Utilizing Dragon Dictation: Part of this note may be an electronic transcription/translation of spoken language to printed text using the Dragon Dictation System.    Alissa Tony D.O.  [facility]

## 2024-06-24 NOTE — ASSESSMENT & PLAN NOTE
Hypertension is improving with treatment.  Continue current treatment regimen.  Blood pressure will be reassessed at the next regular appointment.    Advise that their target blood pressure readings are an average of <140/90.  Advise to follow a low salt diet.       Again, no evidence of pulmonary hypertension.

## 2024-06-24 NOTE — TELEPHONE ENCOUNTER
Caller: Lobo Yu Walter    Relationship: Self    Best call back number: [phone]     Requested Prescriptions:   Requested Prescriptions     Pending Prescriptions Disp Refills    fluticasone (FLONASE) 50 MCG/ACT nasal spray       Si sprays by Each Nare route Daily As Needed for Allergies or Rhinitis. OTC  Indications: Stuffy Nose        Pharmacy where request should be sent: [id number] - [place], [place] - [address] [phone] [address] [phone]      Last office visit with prescribing clinician: 2024   Last telemedicine visit with prescribing clinician: Visit date not found   Next office visit with prescribing clinician: 2024     Additional details provided by patient: OUT OF MEDICATION     Does the patient have less than a 3 day supply:  [x] Yes  [] No    Would you like a call back once the refill request has been completed: [] Yes [x] No    If the office needs to give you a call back, can they leave a voicemail: [] Yes [x] No    Patsy Rivero   24 14:52 EDT

## 2024-06-27 ENCOUNTER — READMISSION MANAGEMENT (OUTPATIENT)
Dept: CALL CENTER | Facility: HOSPITAL | Age: 59
End: 2024-06-27
Payer: COMMERCIAL

## 2024-06-27 NOTE — OUTREACH NOTE
General Surgery Week 2 Survey      Flowsheet Row Responses   [facility] patient discharged from? [facility]   Does the patient have one of the following disease processes/diagnoses(primary or secondary)? General Surgery   Week 2 attempt successful? Yes   Call start time 1212   Call end time 1212   Discharge diagnosis S/P colostomy takedown   Meds reviewed with patient/caregiver? Yes   Is the patient taking all medications as directed (includes completed medication regime)? Yes   Does the patient have a follow up appointment scheduled with their surgeon? Yes   Has the patient kept scheduled appointments due by today? Yes  [Dr. Botello FU apt on 6/26/24]   What is the patient's perception of their health status since discharge? Improving   Week 2 call completed? Yes   Graduated Yes   Graduated/Revoked comments Pt did not have any questions or concerns during call.  Pt states he had a FU apt with his surgeon's office on 6/26/24.   Call end time 1212            Sheila VINCENT - Registered Nurse

## 2024-07-09 DIAGNOSIS — K21.9 GASTROESOPHAGEAL REFLUX DISEASE, UNSPECIFIED WHETHER ESOPHAGITIS PRESENT: ICD-10-CM

## 2024-07-11 RX ORDER — FAMOTIDINE 40 MG/1
TABLET, FILM COATED ORAL
Qty: 90 TABLET | Refills: 1 | Status: SHIPPED | OUTPATIENT
Start: 2024-07-11

## 2024-07-12 ENCOUNTER — TELEPHONE (OUTPATIENT)
Dept: FAMILY MEDICINE CLINIC | Facility: CLINIC | Age: 59
End: 2024-07-12

## 2024-07-12 NOTE — TELEPHONE ENCOUNTER
Caller: Yu Jelaniramin Watson    Relationship: Self    Best call back number: [phone]     What form or medical record are you requesting: LETTER STATING AN INCORRECT DIAGNOSIS ON RECORD    Who is requesting this form or medical record from you: [employer]    How would you like to receive the form or medical records (pick-up, mail, fax): UPLOAD TO [email or web address]    Timeframe paperwork needed: ASAP    Additional notes: THE PATIENT IS APPLYING FOR ADDITIONAL LIFE INSURANCE. WHEN THEY GOT HIS RECORDS THE DIAGNOSIS OF PULMONARY HYPERTENSION CAME UP ON HIS RECORDS. HE HAD BEEN SENT TO GET AN ADDITIONAL TEST AND IT PROVED THAT IT SHOULD NOT HAVE BEEN ON HIS RECORD. HE IS NEEDING A LETTER FROM DR. HERRERA STATING THAT HE WAS NEVER DIAGNOSED WITH THAT MEDICAL CODE OR IT WAS AN ERROR DIAGNOSIS. PLEASE UPLOAD THIS. IF THERE ARE ANY QUESTIONS PLEASE CALL.

## 2024-07-14 ENCOUNTER — HOSPITAL ENCOUNTER (EMERGENCY)
Facility: HOSPITAL | Age: 59
Discharge: HOME | End: 2024-07-14
Attending: FAMILY MEDICINE | Admitting: FAMILY MEDICINE
Payer: COMMERCIAL

## 2024-07-14 ENCOUNTER — APPOINTMENT (OUTPATIENT)
Facility: HOSPITAL | Age: 59
End: 2024-07-14
Payer: COMMERCIAL

## 2024-07-14 VITALS
BODY MASS INDEX: 28.7 KG/M2 | HEIGHT: 71 IN | TEMPERATURE: 97.7 F | OXYGEN SATURATION: 100 % | HEART RATE: 60 BPM | DIASTOLIC BLOOD PRESSURE: 75 MMHG | WEIGHT: 205 LBS | RESPIRATION RATE: 18 BRPM | SYSTOLIC BLOOD PRESSURE: 118 MMHG

## 2024-07-14 DIAGNOSIS — S29.012A MUSCLE STRAIN OF UPPER BACK: Primary | ICD-10-CM

## 2024-07-14 LAB
ALBUMIN SERPL-MCNC: 4.7 G/DL (ref 3.5–5.2)
ALBUMIN/GLOB SERPL: 1.5 G/DL
ALP SERPL-CCNC: 72 U/L (ref 39–117)
ALT SERPL W P-5'-P-CCNC: 39 U/L (ref 1–41)
ANION GAP SERPL CALCULATED.3IONS-SCNC: 11.9 MMOL/L (ref 5–15)
AST SERPL-CCNC: 29 U/L (ref 1–40)
BASOPHILS # BLD AUTO: 0.04 10*3/MM3 (ref 0–0.2)
BASOPHILS NFR BLD AUTO: 0.5 % (ref 0–1.5)
BILIRUB SERPL-MCNC: 0.4 MG/DL (ref 0–1.2)
BILIRUB UR QL STRIP: NEGATIVE
BUN SERPL-MCNC: 13 MG/DL (ref 6–20)
BUN/CREAT SERPL: 9.9 (ref 7–25)
CALCIUM SPEC-SCNC: 9.9 MG/DL (ref 8.6–10.5)
CHLORIDE SERPL-SCNC: 103 MMOL/L (ref 98–107)
CLARITY UR: CLEAR
CO2 SERPL-SCNC: 27.1 MMOL/L (ref 22–29)
COLOR UR: YELLOW
CREAT SERPL-MCNC: 1.31 MG/DL (ref 0.76–1.27)
DEPRECATED RDW RBC AUTO: 42.7 FL (ref 37–54)
EGFRCR SERPLBLD CKD-EPI 2021: 63.5 ML/MIN/1.73
EOSINOPHIL # BLD AUTO: 0.18 10*3/MM3 (ref 0–0.4)
EOSINOPHIL NFR BLD AUTO: 2.5 % (ref 0.3–6.2)
ERYTHROCYTE [DISTWIDTH] IN BLOOD BY AUTOMATED COUNT: 12.5 % (ref 12.3–15.4)
GLOBULIN UR ELPH-MCNC: 3.1 GM/DL
GLUCOSE SERPL-MCNC: 119 MG/DL (ref 65–99)
GLUCOSE UR STRIP-MCNC: NEGATIVE MG/DL
HCT VFR BLD AUTO: 39.7 % (ref 37.5–51)
HGB BLD-MCNC: 13.8 G/DL (ref 13–17.7)
HGB UR QL STRIP.AUTO: NEGATIVE
HOLD SPECIMEN: NORMAL
IMM GRANULOCYTES # BLD AUTO: 0.01 10*3/MM3 (ref 0–0.05)
IMM GRANULOCYTES NFR BLD AUTO: 0.1 % (ref 0–0.5)
KETONES UR QL STRIP: NEGATIVE
LEUKOCYTE ESTERASE UR QL STRIP.AUTO: NEGATIVE
LIPASE SERPL-CCNC: 86 U/L (ref 13–60)
LYMPHOCYTES # BLD AUTO: 2.35 10*3/MM3 (ref 0.7–3.1)
LYMPHOCYTES NFR BLD AUTO: 32.2 % (ref 19.6–45.3)
MCH RBC QN AUTO: 31.8 PG (ref 26.6–33)
MCHC RBC AUTO-ENTMCNC: 34.8 G/DL (ref 31.5–35.7)
MCV RBC AUTO: 91.5 FL (ref 79–97)
MONOCYTES # BLD AUTO: 0.83 10*3/MM3 (ref 0.1–0.9)
MONOCYTES NFR BLD AUTO: 11.4 % (ref 5–12)
NEUTROPHILS NFR BLD AUTO: 3.88 10*3/MM3 (ref 1.7–7)
NEUTROPHILS NFR BLD AUTO: 53.3 % (ref 42.7–76)
NITRITE UR QL STRIP: NEGATIVE
PH UR STRIP.AUTO: 6.5 [PH] (ref 5–8)
PLATELET # BLD AUTO: 245 10*3/MM3 (ref 140–450)
PMV BLD AUTO: 9.4 FL (ref 6–12)
POTASSIUM SERPL-SCNC: 3.7 MMOL/L (ref 3.5–5.2)
PROT SERPL-MCNC: 7.8 G/DL (ref 6–8.5)
PROT UR QL STRIP: NEGATIVE
RBC # BLD AUTO: 4.34 10*6/MM3 (ref 4.14–5.8)
SODIUM SERPL-SCNC: 142 MMOL/L (ref 136–145)
SP GR UR STRIP: <=1.005 (ref 1–1.03)
UROBILINOGEN UR QL STRIP: NORMAL
WBC NRBC COR # BLD AUTO: 7.29 10*3/MM3 (ref 3.4–10.8)
WHOLE BLOOD HOLD COAG: NORMAL
WHOLE BLOOD HOLD SPECIMEN: NORMAL

## 2024-07-14 PROCEDURE — 99285 EMERGENCY DEPT VISIT HI MDM: CPT

## 2024-07-14 PROCEDURE — 25810000003 LACTATED RINGERS SOLUTION: Performed by: PHYSICIAN ASSISTANT

## 2024-07-14 PROCEDURE — 74177 CT ABD & PELVIS W/CONTRAST: CPT

## 2024-07-14 PROCEDURE — 25010000002 KETOROLAC TROMETHAMINE PER 15 MG: Performed by: PHYSICIAN ASSISTANT

## 2024-07-14 PROCEDURE — 85025 COMPLETE CBC W/AUTO DIFF WBC: CPT | Performed by: PHYSICIAN ASSISTANT

## 2024-07-14 PROCEDURE — 80053 COMPREHEN METABOLIC PANEL: CPT | Performed by: PHYSICIAN ASSISTANT

## 2024-07-14 PROCEDURE — 81003 URINALYSIS AUTO W/O SCOPE: CPT | Performed by: FAMILY MEDICINE

## 2024-07-14 PROCEDURE — 25510000001 IOPAMIDOL 61 % SOLUTION: Performed by: FAMILY MEDICINE

## 2024-07-14 PROCEDURE — 83690 ASSAY OF LIPASE: CPT | Performed by: PHYSICIAN ASSISTANT

## 2024-07-14 PROCEDURE — 96374 THER/PROPH/DIAG INJ IV PUSH: CPT

## 2024-07-14 RX ORDER — SODIUM CHLORIDE 0.9 % (FLUSH) 0.9 %
10 SYRINGE (ML) INJECTION AS NEEDED
Status: DISCONTINUED | OUTPATIENT
Start: 2024-07-14 | End: 2024-07-15 | Stop reason: HOSPADM

## 2024-07-14 RX ORDER — LIDOCAINE 50 MG/G
1 PATCH TOPICAL EVERY 24 HOURS
Qty: 14 EACH | Refills: 0 | Status: SHIPPED | OUTPATIENT
Start: 2024-07-14

## 2024-07-14 RX ORDER — KETOROLAC TROMETHAMINE 15 MG/ML
15 INJECTION, SOLUTION INTRAMUSCULAR; INTRAVENOUS ONCE
Status: COMPLETED | OUTPATIENT
Start: 2024-07-14 | End: 2024-07-14

## 2024-07-14 RX ORDER — METHOCARBAMOL 750 MG/1
750 TABLET, FILM COATED ORAL 3 TIMES DAILY
Qty: 15 TABLET | Refills: 0 | Status: SHIPPED | OUTPATIENT
Start: 2024-07-14

## 2024-07-14 RX ADMIN — IOPAMIDOL 100 ML: 612 INJECTION, SOLUTION INTRAVENOUS at 20:59

## 2024-07-14 RX ADMIN — SODIUM CHLORIDE, POTASSIUM CHLORIDE, SODIUM LACTATE AND CALCIUM CHLORIDE 1000 ML: 600; 310; 30; 20 INJECTION, SOLUTION INTRAVENOUS at 20:45

## 2024-07-14 RX ADMIN — KETOROLAC TROMETHAMINE 15 MG: 15 INJECTION, SOLUTION INTRAMUSCULAR; INTRAVENOUS at 20:47

## 2024-07-15 NOTE — FSED PROVIDER NOTE
Subjective  History of Present Illness:    Pt is a 58 yr old male with PMH of GERD, HTN, kidney stones, presents to the emergency department for evaluation of bilateral low back and flank pain that started yesterday. Pt denies fall or traumatic injury. Pt states pain is not consistent with kidney stone. Pt was told there was microscopic hematuria at [facility] prompting him to go to the ED for evaluation to make sure he is medically cleared to go to [place] in the next couple of days. Pt recently had colostomy reversal after complication with diverticulitis on 6/13 without complication. Pt states symptoms are worse with bending and movement of torso. Pt denies LE weakness, paresthesias, saddle anesthesia, fever.      Nurses Notes reviewed and agree, including vitals, allergies, social history and prior medical history.     REVIEW OF SYSTEMS: All systems reviewed and not pertinent unless noted.  Review of Systems   Constitutional:  Negative for chills and fever.   HENT:  Negative for ear pain, sore throat and trouble swallowing.    Eyes:  Negative for visual disturbance.   Respiratory:  Negative for cough and shortness of breath.    Cardiovascular:  Negative for chest pain, palpitations and leg swelling.   Gastrointestinal:  Negative for abdominal distention, abdominal pain, nausea and vomiting.   Endocrine: Negative for cold intolerance and heat intolerance.   Genitourinary:  Negative for dysuria, flank pain, frequency and urgency.   Musculoskeletal:  Positive for back pain. Negative for gait problem and neck pain.   Skin:  Negative for rash.   Allergic/Immunologic: Negative for immunocompromised state.   Neurological:  Negative for dizziness, syncope, light-headedness and numbness.   Hematological:  Does not bruise/bleed easily.   Psychiatric/Behavioral:  Negative for hallucinations. The patient is not nervous/anxious.    All other systems reviewed and are negative.      Past Medical History:   Diagnosis Date     "Diverticulitis     GERD (gastroesophageal reflux disease)     Hypertension     Kidney stones     X2 EPISODES    Wears glasses        Allergies:    Erythromycin      Past Surgical History:   Procedure Laterality Date    APPENDECTOMY      COLONOSCOPY      COLOSTOMY CLOSURE N/A 6/13/2024    Procedure: OPEN COLOSTOMY TAKEDOWN;  Surgeon: Abad Botello MD;  Location: [facility] OR;  Service: General;  Laterality: N/A;    CYSTOSCOPY Bilateral 6/13/2024    Procedure: CYSTOSCOPY TEMPORARY PLACEMENT BILATERAL URETERAL CATHETER;  Surgeon: Mitchell Hoffmann MD;  Location:  [facility] OR;  Service: Urology;  Laterality: Bilateral;    EXPLORATORY LAPAROTOMY N/A 11/20/2023    Procedure: EXPLORATORY LAPAROTOMY, SIGMOID COLECTOMY WITH COLOSTOMY CREAION, INTRA-ABDOMINAL ABSCESS DRAINAGE, APPENDECTOMY;  Surgeon: Hitesh Vallecillo MD;  Location: [facility] OR;  Service: General;  Laterality: N/A;    NEPHROLITHOTOMY      TONSILLECTOMY      WISDOM TOOTH EXTRACTION           Social History     Socioeconomic History    Marital status:    Tobacco Use    Smoking status: Former     Current packs/day: 0.50     Average packs/day: 0.5 packs/day for 30.5 years (15.3 ttl pk-yrs)     Types: Cigarettes     Start date: 1/1/1994     Quit date: 1/11/1981     Passive exposure: Past    Smokeless tobacco: Never   Vaping Use    Vaping status: Never Used   Substance and Sexual Activity    Alcohol use: Yes     Comment: Socially    Drug use: Never    Sexual activity: Defer         History reviewed. No pertinent family history.    Objective  Physical Exam:  /75   Pulse 60   Temp 97.7 °F (36.5 °C) (Oral)   Resp 18   Ht 180.3 cm (71\")   Wt 93 kg (205 lb)   SpO2 100%   BMI 28.59 kg/m²      Physical Exam  Vitals and nursing note reviewed.   Constitutional:       General: He is not in acute distress.     Appearance: Normal appearance. He is not toxic-appearing.   HENT:      Head: Normocephalic and atraumatic.      Nose: Nose normal.      Mouth/Throat: "      Mouth: Mucous membranes are moist.   Eyes:      Extraocular Movements: Extraocular movements intact.      Conjunctiva/sclera: Conjunctivae normal.      Pupils: Pupils are equal, round, and reactive to light.   Neck:      Comments: No midline C,T,L spine TTP  Cardiovascular:      Rate and Rhythm: Normal rate.   Pulmonary:      Effort: Pulmonary effort is normal.   Abdominal:      General: Abdomen is flat. There is no distension.      Palpations: There is no mass.      Tenderness: There is no abdominal tenderness. There is no right CVA tenderness or left CVA tenderness.      Hernia: No hernia is present.   Musculoskeletal:         General: No deformity.      Cervical back: Normal range of motion and neck supple.      Right lower leg: No edema.      Left lower leg: No edema.   Skin:     General: Skin is warm and dry.      Findings: No rash.   Neurological:      General: No focal deficit present.      Mental Status: He is oriented to person, place, and time.      Comments: No saddle anesthesia   Psychiatric:         Behavior: Behavior normal.         Procedures    ED Course:         Lab Results (last 24 hours)       Procedure Component Value Units Date/Time    Urinalysis With Culture If Indicated - Urine, Clean Catch [[id number]]  (Normal) Collected: 07/14/24 2028    Specimen: Urine, Clean Catch Updated: 07/14/24 2032     Color, UA Yellow     Appearance, UA Clear     pH, UA 6.5     Specific Gravity, UA <=1.005     Glucose, UA Negative     Ketones, UA Negative     Bilirubin, UA Negative     Blood, UA Negative     Protein, UA Negative     Leuk Esterase, UA Negative     Nitrite, UA Negative     Urobilinogen, UA 0.2 E.U./dL    Narrative:      In absence of clinical symptoms, the presence of pyuria, bacteria, and/or nitrites on the urinalysis result does not correlate with infection.  Urine microscopic not indicated.    CBC & Differential [[id number]]  (Normal) Collected: 07/14/24 2042    Specimen: Blood Updated: 07/14/24  2048    Narrative:      The following orders were created for panel order CBC & Differential.  Procedure                               Abnormality         Status                     ---------                               -----------         ------                     CBC Auto Differential[[id number]]        Normal              Final result                 Please view results for these tests on the individual orders.    Comprehensive Metabolic Panel [[id number]]  (Abnormal) Collected: 07/14/24 2042    Specimen: Blood Updated: 07/14/24 2105     Glucose 119 mg/dL      BUN 13 mg/dL      Creatinine 1.31 mg/dL      Sodium 142 mmol/L      Potassium 3.7 mmol/L      Chloride 103 mmol/L      CO2 27.1 mmol/L      Calcium 9.9 mg/dL      Total Protein 7.8 g/dL      Albumin 4.7 g/dL      ALT (SGPT) 39 U/L      AST (SGOT) 29 U/L      Alkaline Phosphatase 72 U/L      Total Bilirubin 0.4 mg/dL      Globulin 3.1 gm/dL      A/G Ratio 1.5 g/dL      BUN/Creatinine Ratio 9.9     Anion Gap 11.9 mmol/L      eGFR 63.5 mL/min/1.73     Narrative:      GFR Normal >60  Chronic Kidney Disease <60  Kidney Failure <15      Lipase [[id number]]  (Abnormal) Collected: 07/14/24 2042    Specimen: Blood Updated: 07/14/24 2105     Lipase 86 U/L     CBC Auto Differential [[id number]]  (Normal) Collected: 07/14/24 2042    Specimen: Blood Updated: 07/14/24 2048     WBC 7.29 10*3/mm3      RBC 4.34 10*6/mm3      Hemoglobin 13.8 g/dL      Hematocrit 39.7 %      MCV 91.5 fL      MCH 31.8 pg      MCHC 34.8 g/dL      RDW 12.5 %      RDW-SD 42.7 fl      MPV 9.4 fL      Platelets 245 10*3/mm3      Neutrophil % 53.3 %      Lymphocyte % 32.2 %      Monocyte % 11.4 %      Eosinophil % 2.5 %      Basophil % 0.5 %      Immature Grans % 0.1 %      Neutrophils, Absolute 3.88 10*3/mm3      Lymphocytes, Absolute 2.35 10*3/mm3      Monocytes, Absolute 0.83 10*3/mm3      Eosinophils, Absolute 0.18 10*3/mm3      Basophils, Absolute 0.04 10*3/mm3      Immature Grans, Absolute 0.01  10*3/mm3              CT Abdomen Pelvis With Contrast    Result Date: 7/14/2024  CT ABDOMEN PELVIS W CONTRAST Date of Exam: 7/14/2024 8:57 PM EDT Indication: blateral flank pain, colostomy reversal 6/13, reported microscopic hematuria. Comparison: 11/20/2023. Technique: Axial CT images were obtained of the abdomen and pelvis following the uneventful intravenous administration of intravenous contrast. Reconstructed coronal and sagittal images were also obtained. Automated exposure control and iterative construction methods were used. Findings: Lung Bases:   The visualized lung bases and lower mediastinal structures are unremarkable. Liver: The liver appears hypodense consistent with steatosis.. No focal lesions. Biliary/Gallbladder:  The gallbladder is normal without evidence of radiopaque stones. The biliary tree is nondilated. Spleen: Spleen is normal in size and CT density. Pancreas:  Pancreas is normal. There is no evidence of pancreatic mass or peripancreatic fluid. Kidneys:  Kidneys are normal in size. There are no stones or hydronephrosis. Adrenals:  Adrenal glands are unremarkable. Retroperitoneal/Lymph Nodes/Vasculature:  No retroperitoneal adenopathy is identified. Gastrointestinal/Mesentery:  The bowel loops are non-dilated. Postsurgical changes are seen within the rectum related to colostomy reversal. Mild stranding is seen within the adjacent fat with a trace amount of free fluid, likely related to previous surgery. Moderate stool burden present. No evidence of hernia. Food products are present within the stomach. No focal mass identified. No significant adenopathy. The appendix appears to have been resected. No evidence of obstruction. No free air. No mesenteric fluid collections identified. Bladder:  The bladder is normal. Genital:   Unremarkable       Bony Structures:   Visualized bony structures are consistent with the patient's age.     Impression: Impression: 1.No acute intra-abdominal or  intrapelvic process. Postsurgical changes are seen within the rectum related to colostomy reversal. Trace amount of free fluid present with mild stranding seen within the adjacent fat, likely postsurgical. No focal collection. 2.Ancillary findings as described above. Electronically Signed: Mily Pollard MD  7/14/2024 9:09 PM EDT  Workstation ID: [id number]        [facility]     Amount and/or Complexity of Data Reviewed  Clinical lab tests: reviewed  Tests in the radiology section of CPT®: reviewed  Decide to obtain previous medical records or to obtain history from someone other than the patient: yes        Initial impression of presenting illness: Pt is a 58 yr old male presents to the ED for evaluation of bilateral low back pain and flank pain that started yesterday as well as microscopic hematuria noted at [facility]. Pt denies back red flag symptoms.    Patient arrives afebrile, hemodynamically stable with vitals interpreted by myself.     Pertinent features from physical exam: no midline TTP cervical, thoracic, lumbar spine.    DDX: includes but is not limited to: back muscle strain/spasm, pyelonephritis, kidney stone, others    Initial diagnostic plan and interventions: Workup includes CBC, CMP, lipase, UA, CT abdomen and pelvis with contrast. Interventions include IV fluid and toradol.    Diagnostic information from other sources: previous encounters     Results from initial plan were reviewed and interpreted by me revealing no evidence leukocytosis, anemia, electrolyte abnormality. UA does not show hematuria or UTI. CT does not show acute findings. Creatinine mildly elevated at 1.3 and patient was given IV fluids and instructed to follow up with PCP for recheck.    Re-evaluation: Pt has improved symptoms after interventions and is agreeable with plan to DC home. Pt symptoms most likely due to muscle strain.      Medications   sodium chloride 0.9 % flush 10 mL (has no administration in time range)   lactated ringers bolus  1,000 mL (0 mL Intravenous Stopped 7/14/24 2052)   ketorolac (TORADOL) injection 15 mg (15 mg Intravenous Given 7/14/24 2047)   iopamidol (ISOVUE-300) 61 % injection 100 mL (100 mL Intravenous Given 7/14/24 2059)       Results/clinical rationale were discussed with patient    Data interpreted: Nursing notes reviewed, vital signs reviewed.  Labs independently interpreted by me.  Imaging independently interpreted by me.  EKG independently interpreted by me.     Counseling: Discussed the results above with the patient regarding need for admission or discharge.  Patient understands and agrees plan of care.      -----  ED Disposition       ED Disposition   Discharge    Condition   Stable    Comment   --             Final diagnoses:   Muscle strain of upper back      Your Follow-Up Providers       Alissa Tony DO. Schedule an appointment as soon as possible for a visit in 3 days.    Specialty: Family Medicine  [address] [place], [place] [address] [phone]                       Contact information for after-discharge care    Follow-up information has not been specified.                    Your medication list        START taking these medications        Instructions Last Dose Given Next Dose Due   lidocaine 5 %  Commonly known as: LIDODERM      Place 1 patch on the skin as directed by provider Daily. Remove & Discard patch within 12 hours or as directed by MD       methocarbamol 750 MG tablet  Commonly known as: ROBAXIN      Take 1 tablet by mouth 3 (Three) Times a Day.              CONTINUE taking these medications        Instructions Last Dose Given Next Dose Due   aspirin 81 MG EC tablet      Take 1 tablet by mouth Daily. OTC       atenolol 50 MG tablet  Commonly known as: TENORMIN      Take 1 tablet by mouth Daily. Indications: High Blood Pressure Disorder       famotidine 40 MG tablet  Commonly known as: PEPCID      TAKE 1 TABLET BY MOUTH DAILY FOR GASTROESOPHAGEAL REFLUX DISEASE       fluticasone 50  MCG/ACT nasal spray  Commonly known as: FLONASE      2 sprays by Each Nare route Daily As Needed for Allergies or Rhinitis. OTC  Indications: Stuffy Nose       omeprazole 40 MG capsule  Commonly known as: priLOSEC      Take 1 capsule by mouth Daily. Indications: Gastroesophageal Reflux Disease       sildenafil 50 MG tablet  Commonly known as: VIAGRA      Take 1 tablet by mouth Daily As Needed for Erectile Dysfunction.       triamterene-hydrochlorothiazide 37.5-25 MG per capsule  Commonly known as: DYAZIDE      Take 1 capsule by mouth Daily. Indications: High Blood Pressure Disorder       valACYclovir 1000 MG tablet  Commonly known as: VALTREX      TAKE 1 TABLET BY MOUTH TWICE A DAY                 Where to Get Your Medications        These medications were sent to [facility]/pharmacy #[id number] - [place], [place] - [address] - [phone]  - [phone]  [address] [address]      Phone: [phone]   lidocaine 5 %  methocarbamol 750 MG tablet

## 2024-07-15 NOTE — DISCHARGE INSTRUCTIONS
Follow up with PCP for recheck of symptoms. Return to the ED for new, worsening, concerning symptoms. Follow up with PCP for recheck of creatinine level.

## 2024-07-20 ENCOUNTER — APPOINTMENT (OUTPATIENT)
Dept: CT IMAGING | Facility: HOSPITAL | Age: 59
End: 2024-07-20
Payer: COMMERCIAL

## 2024-07-20 ENCOUNTER — HOSPITAL ENCOUNTER (EMERGENCY)
Facility: HOSPITAL | Age: 59
Discharge: HOME | End: 2024-07-20
Attending: EMERGENCY MEDICINE
Payer: COMMERCIAL

## 2024-07-20 VITALS
BODY MASS INDEX: 28.7 KG/M2 | WEIGHT: 205.03 LBS | SYSTOLIC BLOOD PRESSURE: 133 MMHG | RESPIRATION RATE: 16 BRPM | HEART RATE: 54 BPM | OXYGEN SATURATION: 94 % | TEMPERATURE: 98 F | DIASTOLIC BLOOD PRESSURE: 88 MMHG | HEIGHT: 71 IN

## 2024-07-20 DIAGNOSIS — R10.9 RIGHT FLANK PAIN: Primary | ICD-10-CM

## 2024-07-20 LAB
ALBUMIN SERPL-MCNC: 4.1 G/DL (ref 3.5–5.2)
ALBUMIN/GLOB SERPL: 2.1 G/DL
ALP SERPL-CCNC: 59 U/L (ref 39–117)
ALT SERPL W P-5'-P-CCNC: 29 U/L (ref 1–41)
ANION GAP SERPL CALCULATED.3IONS-SCNC: 10 MMOL/L (ref 5–15)
AST SERPL-CCNC: 22 U/L (ref 1–40)
BASOPHILS # BLD AUTO: 0.05 10*3/MM3 (ref 0–0.2)
BASOPHILS NFR BLD AUTO: 0.8 % (ref 0–1.5)
BILIRUB SERPL-MCNC: 0.6 MG/DL (ref 0–1.2)
BILIRUB UR QL STRIP: NEGATIVE
BUN SERPL-MCNC: 11 MG/DL (ref 6–20)
BUN/CREAT SERPL: 12.5 (ref 7–25)
CALCIUM SPEC-SCNC: 8.4 MG/DL (ref 8.6–10.5)
CHLORIDE SERPL-SCNC: 103 MMOL/L (ref 98–107)
CLARITY UR: CLEAR
CO2 SERPL-SCNC: 27 MMOL/L (ref 22–29)
COLOR UR: ABNORMAL
CREAT BLDA-MCNC: 0.9 MG/DL (ref 0.6–1.3)
CREAT SERPL-MCNC: 0.88 MG/DL (ref 0.76–1.27)
DEPRECATED RDW RBC AUTO: 43.3 FL (ref 37–54)
EGFRCR SERPLBLD CKD-EPI 2021: 100.3 ML/MIN/1.73
EOSINOPHIL # BLD AUTO: 0.12 10*3/MM3 (ref 0–0.4)
EOSINOPHIL NFR BLD AUTO: 1.9 % (ref 0.3–6.2)
ERYTHROCYTE [DISTWIDTH] IN BLOOD BY AUTOMATED COUNT: 12.8 % (ref 12.3–15.4)
GLOBULIN UR ELPH-MCNC: 2 GM/DL
GLUCOSE SERPL-MCNC: 108 MG/DL (ref 65–99)
GLUCOSE UR STRIP-MCNC: NEGATIVE MG/DL
HCT VFR BLD AUTO: 42.9 % (ref 37.5–51)
HGB BLD-MCNC: 14.6 G/DL (ref 13–17.7)
HGB UR QL STRIP.AUTO: NEGATIVE
IMM GRANULOCYTES # BLD AUTO: 0 10*3/MM3 (ref 0–0.05)
IMM GRANULOCYTES NFR BLD AUTO: 0 % (ref 0–0.5)
KETONES UR QL STRIP: ABNORMAL
LEUKOCYTE ESTERASE UR QL STRIP.AUTO: NEGATIVE
LIPASE SERPL-CCNC: 65 U/L (ref 13–60)
LYMPHOCYTES # BLD AUTO: 1.69 10*3/MM3 (ref 0.7–3.1)
LYMPHOCYTES NFR BLD AUTO: 26.5 % (ref 19.6–45.3)
MCH RBC QN AUTO: 31.5 PG (ref 26.6–33)
MCHC RBC AUTO-ENTMCNC: 34 G/DL (ref 31.5–35.7)
MCV RBC AUTO: 92.7 FL (ref 79–97)
MONOCYTES # BLD AUTO: 0.88 10*3/MM3 (ref 0.1–0.9)
MONOCYTES NFR BLD AUTO: 13.8 % (ref 5–12)
NEUTROPHILS NFR BLD AUTO: 3.64 10*3/MM3 (ref 1.7–7)
NEUTROPHILS NFR BLD AUTO: 57 % (ref 42.7–76)
NITRITE UR QL STRIP: NEGATIVE
NRBC BLD AUTO-RTO: 0 /100 WBC (ref 0–0.2)
PH UR STRIP.AUTO: 5.5 [PH] (ref 5–8)
PLATELET # BLD AUTO: 240 10*3/MM3 (ref 140–450)
PMV BLD AUTO: 9.4 FL (ref 6–12)
POTASSIUM SERPL-SCNC: 4 MMOL/L (ref 3.5–5.2)
PROT SERPL-MCNC: 6.1 G/DL (ref 6–8.5)
PROT UR QL STRIP: NEGATIVE
RBC # BLD AUTO: 4.63 10*6/MM3 (ref 4.14–5.8)
SODIUM SERPL-SCNC: 140 MMOL/L (ref 136–145)
SP GR UR STRIP: 1.03 (ref 1–1.03)
UROBILINOGEN UR QL STRIP: ABNORMAL
WBC NRBC COR # BLD AUTO: 6.38 10*3/MM3 (ref 3.4–10.8)

## 2024-07-20 PROCEDURE — 25810000003 SODIUM CHLORIDE 0.9 % SOLUTION: Performed by: EMERGENCY MEDICINE

## 2024-07-20 PROCEDURE — 25510000001 IOPAMIDOL 61 % SOLUTION: Performed by: EMERGENCY MEDICINE

## 2024-07-20 PROCEDURE — 36415 COLL VENOUS BLD VENIPUNCTURE: CPT

## 2024-07-20 PROCEDURE — 96374 THER/PROPH/DIAG INJ IV PUSH: CPT

## 2024-07-20 PROCEDURE — 25010000002 ONDANSETRON PER 1 MG: Performed by: EMERGENCY MEDICINE

## 2024-07-20 PROCEDURE — 81003 URINALYSIS AUTO W/O SCOPE: CPT | Performed by: EMERGENCY MEDICINE

## 2024-07-20 PROCEDURE — 96375 TX/PRO/DX INJ NEW DRUG ADDON: CPT

## 2024-07-20 PROCEDURE — 99285 EMERGENCY DEPT VISIT HI MDM: CPT

## 2024-07-20 PROCEDURE — 74177 CT ABD & PELVIS W/CONTRAST: CPT

## 2024-07-20 PROCEDURE — 85025 COMPLETE CBC W/AUTO DIFF WBC: CPT | Performed by: EMERGENCY MEDICINE

## 2024-07-20 PROCEDURE — 82565 ASSAY OF CREATININE: CPT | Performed by: EMERGENCY MEDICINE

## 2024-07-20 PROCEDURE — 25010000002 HYDROMORPHONE 1 MG/ML SOLUTION: Performed by: EMERGENCY MEDICINE

## 2024-07-20 PROCEDURE — 80053 COMPREHEN METABOLIC PANEL: CPT | Performed by: EMERGENCY MEDICINE

## 2024-07-20 PROCEDURE — 83690 ASSAY OF LIPASE: CPT | Performed by: EMERGENCY MEDICINE

## 2024-07-20 RX ORDER — SODIUM CHLORIDE 0.9 % (FLUSH) 0.9 %
10 SYRINGE (ML) INJECTION AS NEEDED
Status: DISCONTINUED | OUTPATIENT
Start: 2024-07-20 | End: 2024-07-20 | Stop reason: HOSPADM

## 2024-07-20 RX ORDER — ONDANSETRON 2 MG/ML
4 INJECTION INTRAMUSCULAR; INTRAVENOUS ONCE
Status: COMPLETED | OUTPATIENT
Start: 2024-07-20 | End: 2024-07-20

## 2024-07-20 RX ORDER — HYDROMORPHONE HYDROCHLORIDE 1 MG/ML
0.25 INJECTION, SOLUTION INTRAMUSCULAR; INTRAVENOUS; SUBCUTANEOUS ONCE
Status: COMPLETED | OUTPATIENT
Start: 2024-07-20 | End: 2024-07-20

## 2024-07-20 RX ADMIN — SODIUM CHLORIDE 500 ML: 9 INJECTION, SOLUTION INTRAVENOUS at 09:39

## 2024-07-20 RX ADMIN — ONDANSETRON 4 MG: 2 INJECTION INTRAMUSCULAR; INTRAVENOUS at 09:47

## 2024-07-20 RX ADMIN — HYDROMORPHONE HYDROCHLORIDE 0.25 MG: 1 INJECTION, SOLUTION INTRAMUSCULAR; INTRAVENOUS; SUBCUTANEOUS at 09:47

## 2024-07-20 RX ADMIN — IOPAMIDOL 100 ML: 612 INJECTION, SOLUTION INTRAVENOUS at 10:10

## 2024-07-20 NOTE — ED PROVIDER NOTES
"Subjective   History of Present Illness  58-year-old male presents for evaluation of right flank pain and swelling.  Of note, the patient underwent a takedown of his ostomy by Dr. Botello of colorectal surgery on June 13 of this year.  He did well postoperatively until this past week when he began experiencing abdominal pain and back pain.  He went to the emergency department in [place] on July 14 at which time he had a CT scan that was reassuring and he was discharged home.  However, over the past 3 days he has noted a sensation of swelling to his right flank region and was concerned that this could potentially represent a postoperative complication or \"hernia\" and as result came here today for a second opinion.  He currently rates his pain at 5 out of 10 in severity.  He denies any rash.  No urinary symptoms.  No fevers.  No vomiting.      Review of Systems   Genitourinary:  Positive for flank pain.   All other systems reviewed and are negative.      Past Medical History:   Diagnosis Date    Diverticulitis     GERD (gastroesophageal reflux disease)     Hypertension     Kidney stones     X2 EPISODES    Wears glasses        Allergies   Allergen Reactions    Erythromycin GI Intolerance       Past Surgical History:   Procedure Laterality Date    APPENDECTOMY      COLONOSCOPY      COLOSTOMY CLOSURE N/A 6/13/2024    Procedure: OPEN COLOSTOMY TAKEDOWN;  Surgeon: Abad Botello MD;  Location: [facility] OR;  Service: General;  Laterality: N/A;    CYSTOSCOPY Bilateral 6/13/2024    Procedure: CYSTOSCOPY TEMPORARY PLACEMENT BILATERAL URETERAL CATHETER;  Surgeon: Mitchell Hoffmann MD;  Location:  [facility] OR;  Service: Urology;  Laterality: Bilateral;    EXPLORATORY LAPAROTOMY N/A 11/20/2023    Procedure: EXPLORATORY LAPAROTOMY, SIGMOID COLECTOMY WITH COLOSTOMY CREAION, INTRA-ABDOMINAL ABSCESS DRAINAGE, APPENDECTOMY;  Surgeon: Hitesh Vallecillo MD;  Location:  [facility] OR;  Service: General;  Laterality: N/A;    NEPHROLITHOTOMY      " TONSILLECTOMY      WISDOM TOOTH EXTRACTION         No family history on file.    Social History     Socioeconomic History    Marital status:    Tobacco Use    Smoking status: Former     Current packs/day: 0.50     Average packs/day: 0.5 packs/day for 30.5 years (15.3 ttl pk-yrs)     Types: Cigarettes     Start date: 1/1/1994     Quit date: 1/11/1981     Passive exposure: Past    Smokeless tobacco: Never   Vaping Use    Vaping status: Never Used   Substance and Sexual Activity    Alcohol use: Yes     Comment: Socially    Drug use: Never    Sexual activity: Defer           Objective   Physical Exam  Vitals and nursing note reviewed.   Constitutional:       General: He is not in acute distress.     Appearance: Normal appearance. He is well-developed. He is not diaphoretic.      Comments: Nontoxic-appearing male   HENT:      Head: Normocephalic and atraumatic.   Neck:      Vascular: No JVD.   Cardiovascular:      Rate and Rhythm: Normal rate and regular rhythm.      Heart sounds: Normal heart sounds. No murmur heard.     No friction rub. No gallop.   Pulmonary:      Effort: Pulmonary effort is normal. No respiratory distress.      Breath sounds: Normal breath sounds. No wheezing or rales.   Abdominal:      General: Bowel sounds are normal. There is no distension.      Palpations: Abdomen is soft. There is no mass.      Tenderness: There is no abdominal tenderness. There is no guarding.      Comments: No focal abdominal tenderness, no peritoneal signs, no pain out of proportion to exam   Genitourinary:     Comments: No CVA tenderness noted  Musculoskeletal:         General: Normal range of motion.      Cervical back: Normal range of motion.   Skin:     General: Skin is warm and dry.      Coloration: Skin is not pale.      Findings: No erythema or rash.      Comments: No dermatomal rash present   Neurological:      Mental Status: He is alert and oriented to person, place, and time.   Psychiatric:         Mood and  "Affect: Mood normal.         Thought Content: Thought content normal.         Judgment: Judgment normal.         Procedures           ED Course  ED Course as of 07/20/24 1303   Sat Jul 20, 2024   1156 58-year-old male presents for evaluation of right flank pain and swelling.  Of note, the patient underwent a takedown of his ostomy by Dr. Botello of colorectal surgery on June 13.  He did well postoperatively until this past week when he began experiencing abdominal pain and back pain.  He went to the emergency department [place] on July 14 at which time he had a CT scan that was reassuring and he was discharged home. [DD]   1158 However, over the past 3 days he has noted swelling to his right flank region that he was concerned could potentially represent a \"hernia\" and as a result came here today for a second opinion.  On arrival, the patient is nontoxic-appearing.  Nonsurgical abdomen.  No CVA tenderness noted.  No dermatomal rash present.  No palpable hernia noted. [DD]   1158 Labs are bland/unrevealing. [DD]   1158 I personally and independently reviewed the patient's CT images and findings, and I am in agreement with the radiologist regarding CT interpretation--particularly there is no hernia noted.  There is no emergent/surgical intra-abdominal process present. [DD]   1158 Patient reassured and counseled regarding symptomatic management.  I advised him to follow-up with Dr. Botello of colorectal surgery within the next week.  Agreeable with plan and given appropriate strict return precautions. [DD]      ED Course User Index  [DD] Eric Deshpande MD                                   Recent Results (from the past 24 hour(s))   CBC Auto Differential    Collection Time: 07/20/24  9:30 AM    Specimen: Blood   Result Value Ref Range    WBC 6.38 3.40 - 10.80 10*3/mm3    RBC 4.63 4.14 - 5.80 10*6/mm3    Hemoglobin 14.6 13.0 - 17.7 g/dL    Hematocrit 42.9 37.5 - 51.0 %    MCV 92.7 79.0 - 97.0 fL    MCH 31.5 26.6 - 33.0 pg "    MCHC 34.0 31.5 - 35.7 g/dL    RDW 12.8 12.3 - 15.4 %    RDW-SD 43.3 37.0 - 54.0 fl    MPV 9.4 6.0 - 12.0 fL    Platelets 240 140 - 450 10*3/mm3    Neutrophil % 57.0 42.7 - 76.0 %    Lymphocyte % 26.5 19.6 - 45.3 %    Monocyte % 13.8 (H) 5.0 - 12.0 %    Eosinophil % 1.9 0.3 - 6.2 %    Basophil % 0.8 0.0 - 1.5 %    Immature Grans % 0.0 0.0 - 0.5 %    Neutrophils, Absolute 3.64 1.70 - 7.00 10*3/mm3    Lymphocytes, Absolute 1.69 0.70 - 3.10 10*3/mm3    Monocytes, Absolute 0.88 0.10 - 0.90 10*3/mm3    Eosinophils, Absolute 0.12 0.00 - 0.40 10*3/mm3    Basophils, Absolute 0.05 0.00 - 0.20 10*3/mm3    Immature Grans, Absolute 0.00 0.00 - 0.05 10*3/mm3    nRBC 0.0 0.0 - 0.2 /100 WBC   POC Creatinine    Collection Time: 07/20/24  9:35 AM    Specimen: Blood   Result Value Ref Range    Creatinine 0.90 0.60 - 1.30 mg/dL   Urinalysis With Culture If Indicated - Urine, Clean Catch    Collection Time: 07/20/24  9:39 AM    Specimen: Urine, Clean Catch   Result Value Ref Range    Color, UA Dark Yellow (A) Yellow, Straw    Appearance, UA Clear Clear    pH, UA 5.5 5.0 - 8.0    Specific Gravity, UA 1.026 1.001 - 1.030    Glucose, UA Negative Negative    Ketones, UA Trace (A) Negative    Bilirubin, UA Negative Negative    Blood, UA Negative Negative    Protein, UA Negative Negative    Leuk Esterase, UA Negative Negative    Nitrite, UA Negative Negative    Urobilinogen, UA 1.0 E.U./dL 0.2 - 1.0 E.U./dL   Comprehensive Metabolic Panel    Collection Time: 07/20/24 10:32 AM    Specimen: Blood   Result Value Ref Range    Glucose 108 (H) 65 - 99 mg/dL    BUN 11 6 - 20 mg/dL    Creatinine 0.88 0.76 - 1.27 mg/dL    Sodium 140 136 - 145 mmol/L    Potassium 4.0 3.5 - 5.2 mmol/L    Chloride 103 98 - 107 mmol/L    CO2 27.0 22.0 - 29.0 mmol/L    Calcium 8.4 (L) 8.6 - 10.5 mg/dL    Total Protein 6.1 6.0 - 8.5 g/dL    Albumin 4.1 3.5 - 5.2 g/dL    ALT (SGPT) 29 1 - 41 U/L    AST (SGOT) 22 1 - 40 U/L    Alkaline Phosphatase 59 39 - 117 U/L    Total  Bilirubin 0.6 0.0 - 1.2 mg/dL    Globulin 2.0 gm/dL    A/G Ratio 2.1 g/dL    BUN/Creatinine Ratio 12.5 7.0 - 25.0    Anion Gap 10.0 5.0 - 15.0 mmol/L    eGFR 100.3 >60.0 mL/min/1.73   Lipase    Collection Time: 07/20/24 10:32 AM    Specimen: Blood   Result Value Ref Range    Lipase 65 (H) 13 - 60 U/L     Note: In addition to lab results from this visit, the labs listed above may include labs taken at another facility or during a different encounter within the last 24 hours. Please correlate lab times with ED admission and discharge times for further clarification of the services performed during this visit.    CT Abdomen Pelvis With Contrast   Final Result   Impression:   1.No acute abnormality identified within the abdomen or pelvis.   2.Surgical changes of the rectosigmoid colon. Mild stranding and trace fluid within the pelvis appear essentially unchanged since 7/14/2024 and may be postsurgical.   3.Hepatic steatosis.   4.Additional findings as detailed above.         Electronically Signed: Arben Daniels MD     7/20/2024 10:31 AM EDT     Workstation ID: [id number]        Vitals:    07/20/24 1030 07/20/24 1100 07/20/24 1130 07/20/24 1132   BP: 125/82 120/76 133/88    BP Location:       Patient Position:       Pulse:  53 54 54   Resp:    16   Temp:       TempSrc:       SpO2: 98% 98%  94%   Weight:       Height:         Medications   sodium chloride 0.9 % flush 10 mL (has no administration in time range)   sodium chloride 0.9 % bolus 500 mL (0 mL Intravenous Stopped 7/20/24 1044)   HYDROmorphone (DILAUDID) injection 0.25 mg (0.25 mg Intravenous Given 7/20/24 0947)   ondansetron (ZOFRAN) injection 4 mg (4 mg Intravenous Given 7/20/24 0947)   iopamidol (ISOVUE-300) 61 % injection 100 mL (100 mL Intravenous Given 7/20/24 1010)     ECG/EMG Results (last 24 hours)       ** No results found for the last 24 hours. **          No orders to display                 Medical Decision Making  Problems Addressed:  Right flank  pain: complicated acute illness or injury    Amount and/or Complexity of Data Reviewed  Labs: ordered.  Radiology: ordered.    Risk  Prescription drug management.        Final diagnoses:   Right flank pain       ED Disposition  ED Disposition       ED Disposition   Discharge    Condition   Stable    Comment   --               Abad Botello MD  [address] [place] [address] [phone]    In 1 week           Medication List      No changes were made to your prescriptions during this visit.            Eric Deshpande MD  07/20/24 3393

## 2024-07-24 ENCOUNTER — OFFICE VISIT (OUTPATIENT)
Dept: FAMILY MEDICINE CLINIC | Facility: CLINIC | Age: 59
End: 2024-07-24
Payer: COMMERCIAL

## 2024-07-24 VITALS
HEIGHT: 71 IN | WEIGHT: 203 LBS | DIASTOLIC BLOOD PRESSURE: 90 MMHG | BODY MASS INDEX: 28.42 KG/M2 | TEMPERATURE: 97.5 F | OXYGEN SATURATION: 99 % | SYSTOLIC BLOOD PRESSURE: 142 MMHG | HEART RATE: 65 BPM

## 2024-07-24 DIAGNOSIS — R10.9 FLANK PAIN: Primary | ICD-10-CM

## 2024-07-24 PROCEDURE — 99214 OFFICE O/P EST MOD 30 MIN: CPT | Performed by: PHYSICIAN ASSISTANT

## 2024-07-24 RX ORDER — METHYLPREDNISOLONE 4 MG/1
TABLET ORAL
Qty: 21 TABLET | Refills: 0 | Status: SHIPPED | OUTPATIENT
Start: 2024-07-24

## 2024-07-24 RX ORDER — DOCUSATE SODIUM 100 MG/1
1 CAPSULE, LIQUID FILLED ORAL DAILY
COMMUNITY
Start: 2024-07-14

## 2024-07-24 RX ORDER — GABAPENTIN 300 MG/1
300 CAPSULE ORAL NIGHTLY
Qty: 30 CAPSULE | Refills: 0 | Status: SHIPPED | OUTPATIENT
Start: 2024-07-24

## 2024-07-24 NOTE — PROGRESS NOTES
Follow Up Office Visit    Date: 2024   Patient Name: Lobo Yu  : 1965   MRN: [id number]     Chief Complaint:    Chief Complaint   Patient presents with    Flank Pain     Bulge on right side. Very painful. Seroma? Hernia?    Numbness     Has had CT scan.       History of Present Illness:   Lobo Yu is a 58 y.o. male.  History of Present Illness  The patient presents for evaluation of flank pain.    Following a reversal of his ostomy bag on 2023, he began experiencing right-sided flank pain that night, accompanied by a bulge and numbness. The ER physician attributed the discomfort to fluid pocket and reassured him it would resolve on its own. He consulted with Dr. Botello, who performed the surgery, this morning, who reassured him that it was not a cause for concern. Despite undergoing two CT scans, one on 2024 and one on 2024, the results were normal. Initially, he suspected a kidney stone, a condition he has experienced in the past. The ER physician diagnosed him with a muscle strain and prescribed muscle relaxants. The numbness began after removing the patch, leading him to discontinue its use after 3 to 4 days. He returned to the ER on Friday night, where another CT scan was performed, revealing it was not a flank hernia, but rather an area of fluid. He has been off work for 6 weeks and is scheduled to return to work on Monday. His pain persists throughout the day, but intensifies when lying down, causing discomfort and sleep. The pain is localized and does not radiate into his groin. He denies any pain, numbness, or weakness radiating down his legs. His bowel movements are regular, and he denies any urinary symptoms. He denies fever, nausea, or vomiting.  Walking does not cause discomfort, but standing still causes pain. He denies any breathing difficulties. Light touch does not cause discomfort, but numbness is reported. He perforated his bowel a week  before Thanksgiving.        Subjective    Review of systems:  Review of Systems     I have reviewed and the following portions of the patient's history were updated as appropriate: past family history, past medical history, past social history, past surgical history and problem list.    Medications:     Current Outpatient Medications:     aspirin 81 MG EC tablet, Take 1 tablet by mouth Daily. OTC, Disp: , Rfl:     atenolol (TENORMIN) 50 MG tablet, Take 1 tablet by mouth Daily. Indications: High Blood Pressure Disorder, Disp: 90 tablet, Rfl: 1    docusate sodium (COLACE) 100 MG capsule, Take 1 capsule by mouth Daily., Disp: , Rfl:     famotidine (PEPCID) 40 MG tablet, TAKE 1 TABLET BY MOUTH DAILY FOR GASTROESOPHAGEAL REFLUX DISEASE, Disp: 90 tablet, Rfl: 1    fluticasone (FLONASE) 50 MCG/ACT nasal spray, 2 sprays by Each Nare route Daily As Needed for Allergies or Rhinitis. OTC  Indications: Stuffy Nose, Disp: 15.8 mL, Rfl: 2    omeprazole (priLOSEC) 40 MG capsule, Take 1 capsule by mouth Daily. Indications: Gastroesophageal Reflux Disease (Patient taking differently: Take 1 capsule by mouth Daily.), Disp: 90 capsule, Rfl: 1    sildenafil (VIAGRA) 50 MG tablet, Take 1 tablet by mouth Daily As Needed for Erectile Dysfunction., Disp: 90 tablet, Rfl: 1    triamterene-hydrochlorothiazide (DYAZIDE) 37.5-25 MG per capsule, Take 1 capsule by mouth Daily. Indications: High Blood Pressure Disorder, Disp: 90 capsule, Rfl: 1    valACYclovir (VALTREX) 1000 MG tablet, TAKE 1 TABLET BY MOUTH TWICE A DAY, Disp: 180 tablet, Rfl: 3    gabapentin (NEURONTIN) 300 MG capsule, Take 1 capsule by mouth Every Night., Disp: 30 capsule, Rfl: 0    lidocaine (LIDODERM) 5 %, Place 1 patch on the skin as directed by provider Daily. Remove & Discard patch within 12 hours or as directed by MD (Patient not taking: Reported on 7/24/2024), Disp: 14 each, Rfl: 0    methocarbamol (ROBAXIN) 750 MG tablet, Take 1 tablet by mouth 3 (Three) Times a Day.,  "Disp: 15 tablet, Rfl: 0    methylPREDNISolone (MEDROL) 4 MG dose pack, Take as directed on package instructions., Disp: 21 tablet, Rfl: 0    Allergies:   Allergies   Allergen Reactions    Erythromycin GI Intolerance       Objective   Vital Signs:   Vitals:    07/24/24 1027   BP: 142/90   Pulse: 65   Temp: 97.5 °F (36.4 °C)   SpO2: 99%   Weight: 92.1 kg (203 lb)   Height: 180.3 cm (70.98\")     Body mass index is 28.33 kg/m².          Physical Exam:   Physical Exam  Vitals and nursing note reviewed.   Constitutional:       Appearance: Normal appearance.   HENT:      Head: Normocephalic and atraumatic.      Right Ear: Tympanic membrane and ear canal normal.      Left Ear: Tympanic membrane and ear canal normal.      Nose: No congestion or rhinorrhea.      Mouth/Throat:      Mouth: Mucous membranes are moist.      Pharynx: Oropharynx is clear. No posterior oropharyngeal erythema.   Cardiovascular:      Rate and Rhythm: Normal rate and regular rhythm.   Pulmonary:      Effort: Pulmonary effort is normal.      Breath sounds: Normal breath sounds. No decreased breath sounds, wheezing, rhonchi or rales.   Abdominal:      Palpations: Abdomen is soft. There is no mass.      Tenderness: There is no abdominal tenderness. There is right CVA tenderness. There is no guarding or rebound.   Musculoskeletal:      Cervical back: Neck supple.      Right lower leg: No edema.      Left lower leg: No edema.   Lymphadenopathy:      Cervical: No cervical adenopathy.   Skin:     Findings: No rash.   Neurological:      Mental Status: He is alert.          Procedures     Assessment / Plan    Assessment/Plan:   Diagnoses and all orders for this visit:    1. Flank pain (Primary)  -     methylPREDNISolone (MEDROL) 4 MG dose pack; Take as directed on package instructions.  Dispense: 21 tablet; Refill: 0  -     gabapentin (NEURONTIN) 300 MG capsule; Take 1 capsule by mouth Every Night.  Dispense: 30 capsule; Refill: 0       Assessment & Plan  1. " Flank pain.    Did consult with , and he also examined patient.  With 2 normal CT scans I do not believe there is any infection or problems with his recent surgery.  This could certainly be a compressed nerve or even shingles that has not produced a rash.  We decided to treat this has a nerve issue, we will do a Medrol Dosepak and Neurontin as directed at night, he will monitor and if this does not improve he will let us know.    Follow Up:   No follow-ups on file.    Patient or patient representative verbalized consent for the use of Ambient Listening during the visit with  Alisha Sinclair PA-C for chart documentation. 8/6/2024  08:34 EDT    Alisha Sinclair PA-C   [facility]  Answers submitted by the patient for this visit:  Primary Reason for Visit (Submitted on 7/23/2024)  What is the primary reason for your visit?: Back Pain  Back Pain Questionnaire (Submitted on 7/23/2024)  Chief Complaint: Back pain  Chronicity: new  Onset: 1 to 4 weeks ago  Frequency: constantly  Progression since onset: unchanged  Pain location: lumbar spine  Pain quality: aching, stabbing  Pain - numeric: 8/10  Pain is: the same all the time  Aggravated by: lying down, sitting, standing  Stiffness is present: all day

## 2024-09-03 ENCOUNTER — TELEPHONE (OUTPATIENT)
Dept: FAMILY MEDICINE CLINIC | Facility: CLINIC | Age: 59
End: 2024-09-03
Payer: COMMERCIAL

## 2024-09-03 NOTE — TELEPHONE ENCOUNTER
Caller: Lobo Yu    Relationship: Self    Best call back number: [phone]     What orders are you requesting (i.e. lab or imaging): MRI    In what timeframe would the patient need to come in: ANY DAY THIS WEEK 09/03/24-09/06/24 PREFERABLY    Where will you receive your lab/imaging services: ANYWHERE    Additional notes: PATIENT HAS BEEN TO THE EMERGENCY ROOM AND HAD A CT SCAN DONE ON THE BULGE ON HIS RIGHT SIDE. HE WAS TOLD IT WAS FLUID BUILD UP. HE IS NOT IN PAIN BUT IS CONCERNED ABOUT IT.

## 2024-09-23 ENCOUNTER — OFFICE VISIT (OUTPATIENT)
Dept: FAMILY MEDICINE CLINIC | Facility: CLINIC | Age: 59
End: 2024-09-23
Payer: COMMERCIAL

## 2024-09-23 VITALS
DIASTOLIC BLOOD PRESSURE: 80 MMHG | SYSTOLIC BLOOD PRESSURE: 130 MMHG | TEMPERATURE: 98.2 F | HEIGHT: 71 IN | HEART RATE: 80 BPM | BODY MASS INDEX: 27.58 KG/M2 | WEIGHT: 197 LBS

## 2024-09-23 DIAGNOSIS — R19.00 ABDOMINAL WALL BULGE: ICD-10-CM

## 2024-09-23 DIAGNOSIS — J01.00 ACUTE NON-RECURRENT MAXILLARY SINUSITIS: Primary | ICD-10-CM

## 2024-09-23 PROCEDURE — 99214 OFFICE O/P EST MOD 30 MIN: CPT | Performed by: FAMILY MEDICINE

## 2024-09-23 RX ORDER — CEFDINIR 300 MG/1
300 CAPSULE ORAL 2 TIMES DAILY
Qty: 14 CAPSULE | Refills: 0 | Status: SHIPPED | OUTPATIENT
Start: 2024-09-23 | End: 2024-09-30

## 2024-09-23 RX ORDER — BENZONATATE 100 MG/1
100 CAPSULE ORAL 3 TIMES DAILY PRN
Qty: 30 CAPSULE | Refills: 0 | Status: SHIPPED | OUTPATIENT
Start: 2024-09-23

## 2024-09-24 RX ORDER — HYDROXYZINE PAMOATE 25 MG/1
25 CAPSULE ORAL NIGHTLY PRN
Qty: 30 CAPSULE | Refills: 2 | Status: SHIPPED | OUTPATIENT
Start: 2024-09-24

## 2024-09-30 ENCOUNTER — TELEPHONE (OUTPATIENT)
Dept: FAMILY MEDICINE CLINIC | Facility: CLINIC | Age: 59
End: 2024-09-30
Payer: COMMERCIAL

## 2024-09-30 RX ORDER — METHYLPREDNISOLONE 4 MG
TABLET, DOSE PACK ORAL
Qty: 21 TABLET | Refills: 0 | Status: SHIPPED | OUTPATIENT
Start: 2024-09-30

## 2024-09-30 NOTE — TELEPHONE ENCOUNTER
PATIENT HAS CALLED AND STATED HE WAS RECENTLY SEEN AND TREATED FOR SINUS INFECTION. PATIENT STATES HE IS STILL FEELING A LOT OF PRESSURE IN HIS EARS. PATIENT IS LEAVING TO GET BACK ON THE ROAD TONIGHT AND IS REQUESTING A CALL BACK ASAP TODAY TO ADVISE ON WHAT ELSE HE CAN DO IN HIS PLAN OF CARE.    CALL BACK NUMBER [phone]

## 2024-10-01 RX ORDER — FLUTICASONE PROPIONATE 50 UG/1
SPRAY, METERED NASAL
Qty: 48 ML | Refills: 2 | Status: SHIPPED | OUTPATIENT
Start: 2024-10-01

## 2024-10-05 DIAGNOSIS — K21.9 GASTROESOPHAGEAL REFLUX DISEASE, UNSPECIFIED WHETHER ESOPHAGITIS PRESENT: ICD-10-CM

## 2024-10-07 RX ORDER — OMEPRAZOLE 40 MG/1
CAPSULE, DELAYED RELEASE ORAL
Qty: 90 CAPSULE | Refills: 1 | Status: SHIPPED | OUTPATIENT
Start: 2024-10-07

## 2024-10-14 ENCOUNTER — HOSPITAL ENCOUNTER (OUTPATIENT)
Dept: MRI IMAGING | Facility: HOSPITAL | Age: 59
Discharge: HOME | End: 2024-10-14
Admitting: FAMILY MEDICINE
Payer: COMMERCIAL

## 2024-10-14 DIAGNOSIS — R19.00 ABDOMINAL WALL BULGE: ICD-10-CM

## 2024-10-14 PROCEDURE — 74181 MRI ABDOMEN W/O CONTRAST: CPT

## 2024-12-16 ENCOUNTER — OFFICE VISIT (OUTPATIENT)
Dept: FAMILY MEDICINE CLINIC | Facility: CLINIC | Age: 59
End: 2024-12-16
Payer: COMMERCIAL

## 2024-12-16 VITALS
DIASTOLIC BLOOD PRESSURE: 78 MMHG | TEMPERATURE: 98 F | WEIGHT: 208 LBS | BODY MASS INDEX: 29.12 KG/M2 | HEIGHT: 71 IN | SYSTOLIC BLOOD PRESSURE: 114 MMHG | HEART RATE: 81 BPM

## 2024-12-16 DIAGNOSIS — E78.5 HYPERLIPIDEMIA, UNSPECIFIED HYPERLIPIDEMIA TYPE: ICD-10-CM

## 2024-12-16 DIAGNOSIS — Z23 NEED FOR VACCINATION: ICD-10-CM

## 2024-12-16 DIAGNOSIS — I10 ESSENTIAL HYPERTENSION: Primary | ICD-10-CM

## 2024-12-16 DIAGNOSIS — N52.9 ERECTILE DYSFUNCTION, UNSPECIFIED ERECTILE DYSFUNCTION TYPE: ICD-10-CM

## 2024-12-16 DIAGNOSIS — K21.9 GASTROESOPHAGEAL REFLUX DISEASE, UNSPECIFIED WHETHER ESOPHAGITIS PRESENT: ICD-10-CM

## 2024-12-16 DIAGNOSIS — G47.9 SLEEP DISTURBANCE: ICD-10-CM

## 2024-12-16 PROBLEM — K57.90 DIVERTICULAR DISEASE: Status: ACTIVE | Noted: 2022-04-04

## 2024-12-16 PROBLEM — K21.00 GASTRO-ESOPHAGEAL REFLUX DISEASE WITH ESOPHAGITIS: Status: RESOLVED | Noted: 2019-01-14 | Resolved: 2024-12-16

## 2024-12-16 PROBLEM — K21.00 GASTRO-ESOPHAGEAL REFLUX DISEASE WITH ESOPHAGITIS: Status: ACTIVE | Noted: 2019-01-14

## 2024-12-16 PROCEDURE — 90715 TDAP VACCINE 7 YRS/> IM: CPT | Performed by: FAMILY MEDICINE

## 2024-12-16 PROCEDURE — 99214 OFFICE O/P EST MOD 30 MIN: CPT | Performed by: FAMILY MEDICINE

## 2024-12-16 PROCEDURE — 90471 IMMUNIZATION ADMIN: CPT | Performed by: FAMILY MEDICINE

## 2024-12-16 RX ORDER — OMEPRAZOLE 40 MG/1
40 CAPSULE, DELAYED RELEASE ORAL DAILY
Qty: 90 CAPSULE | Refills: 1 | Status: SHIPPED | OUTPATIENT
Start: 2024-12-16

## 2024-12-16 RX ORDER — FAMOTIDINE 40 MG/1
40 TABLET, FILM COATED ORAL DAILY
Qty: 90 TABLET | Refills: 1 | Status: SHIPPED | OUTPATIENT
Start: 2024-12-16

## 2024-12-16 RX ORDER — SILDENAFIL 50 MG/1
50 TABLET, FILM COATED ORAL DAILY PRN
Qty: 90 TABLET | Refills: 1 | Status: SHIPPED | OUTPATIENT
Start: 2024-12-16

## 2024-12-16 RX ORDER — TRIAMTERENE AND HYDROCHLOROTHIAZIDE 37.5; 25 MG/1; MG/1
1 CAPSULE ORAL DAILY
Qty: 90 CAPSULE | Refills: 1 | Status: SHIPPED | OUTPATIENT
Start: 2024-12-16

## 2024-12-16 RX ORDER — ATENOLOL 50 MG/1
50 TABLET ORAL DAILY
Qty: 90 TABLET | Refills: 1 | Status: SHIPPED | OUTPATIENT
Start: 2024-12-16

## 2024-12-16 RX ORDER — HYDROXYZINE PAMOATE 25 MG/1
25 CAPSULE ORAL NIGHTLY PRN
Qty: 30 CAPSULE | Refills: 2 | Status: SHIPPED | OUTPATIENT
Start: 2024-12-16

## 2024-12-16 NOTE — ASSESSMENT & PLAN NOTE
Lipid abnormalities are stable    Plan:  Lipid lowering therapy not prescribed due to ASCVD risk <7.5%    Discussed medication dosage, use, side effects, and goals of treatment in detail.    Counseled patient on lifestyle modifications to help control hyperlipidemia.     Patient Treatment Goals:   LDL goal is under 100    Followup in 6 months.

## 2024-12-16 NOTE — PROGRESS NOTES
Subjective     Chief Complaint  Hypertension    Subjective          Christopher Walter Yu is a 59 y.o. male who presents today to [facility] for follow up.    HPI:   History of Present Illness    Mr. Yu is a pleasant 59-year-old male presents today to follow-up on chronic conditions.    He has a past medical history of hypertension, perforated diverticulum of the intestine, had colostomy takedown 6/13/2024, GERD    Hypertension -his blood pressure in office today is 114/78, his current prescriptions include atenolol 50 mg daily, triamterene-HCTZ 37.5 mg / 25 mg daily.    For GERD -he is prescribed Omeprazole 40 mg daily and Pepcid 40 mg daily as needed. He reports no recent issues with reflux     He his still having some bulging on the right side of his abdomen that has been present for a few months, especially after his colostomy takedown. His colostomy was on the left side of his abdomen.     The following portions of the patient's history were reviewed and updated as appropriate: allergies, current medications, past family history, past medical history, past social history, past surgical history and problem list.    Objective     Objective     Allergy:   Allergies   Allergen Reactions    Erythromycin GI Intolerance        Current Medications:   Current Outpatient Medications   Medication Sig Dispense Refill    aspirin 81 MG EC tablet Take 1 tablet by mouth Daily. OTC      atenolol (TENORMIN) 50 MG tablet Take 1 tablet by mouth Daily. Indications: High Blood Pressure 90 tablet 1    famotidine (PEPCID) 40 MG tablet Take 1 tablet by mouth Daily. 90 tablet 1    fluticasone (FLONASE) 50 MCG/ACT nasal spray SPRAY 2 SPRAYS INTO EACH NOSTRIL EVERY DAY AS NEEDED FOR ALLERGIES 48 mL 2    hydrOXYzine pamoate (Vistaril) 25 MG capsule Take 1 capsule by mouth At Night As Needed (sleep). 30 capsule 2    omeprazole (priLOSEC) 40 MG capsule Take 1 capsule by mouth Daily. 90 capsule 1     sildenafil (VIAGRA) 50 MG tablet Take 1 tablet by mouth Daily As Needed for Erectile Dysfunction. 90 tablet 1    triamterene-hydrochlorothiazide (DYAZIDE) 37.5-25 MG per capsule Take 1 capsule by mouth Daily. Indications: High Blood Pressure 90 capsule 1    valACYclovir (VALTREX) 1000 MG tablet TAKE 1 TABLET BY MOUTH TWICE A  tablet 3     No current facility-administered medications for this visit.       Past Medical History:  Past Medical History:   Diagnosis Date    Diverticulitis     GERD (gastroesophageal reflux disease)     Hypertension     Kidney stones     X2 EPISODES    Wears glasses        Past Surgical History:  Past Surgical History:   Procedure Laterality Date    APPENDECTOMY      COLONOSCOPY      COLOSTOMY CLOSURE N/A 6/13/2024    Procedure: OPEN COLOSTOMY TAKEDOWN;  Surgeon: Abad Botello MD;  Location:  [facility];  Service: General;  Laterality: N/A;    CYSTOSCOPY Bilateral 6/13/2024    Procedure: CYSTOSCOPY TEMPORARY PLACEMENT BILATERAL URETERAL CATHETER;  Surgeon: Mitchell Hoffmann MD;  Location:  [facility];  Service: Urology;  Laterality: Bilateral;    EXPLORATORY LAPAROTOMY N/A 11/20/2023    Procedure: EXPLORATORY LAPAROTOMY, SIGMOID COLECTOMY WITH COLOSTOMY CREAION, INTRA-ABDOMINAL ABSCESS DRAINAGE, APPENDECTOMY;  Surgeon: Hitesh Vallecillo MD;  Location:  [facility];  Service: General;  Laterality: N/A;    NEPHROLITHOTOMY      TONSILLECTOMY      WISDOM TOOTH EXTRACTION         Social History:  Social History     Socioeconomic History    Marital status:    Tobacco Use    Smoking status: Former     Current packs/day: 0.50     Average packs/day: 0.5 packs/day for 31.0 years (15.5 ttl pk-yrs)     Types: Cigarettes     Start date: 1/1/1994     Quit date: 1/11/1981     Passive exposure: Past    Smokeless tobacco: Never   Vaping Use    Vaping status: Never Used   Substance and Sexual Activity    Alcohol use: Yes     Comment: Socially    Drug use: Never    Sexual activity: Defer  "      Family History:  History reviewed. No pertinent family history.    Vital Signs:   /78   Pulse 81   Temp 98 °F (36.7 °C) (Infrared)   Ht 180.3 cm (70.98\")   Wt 94.3 kg (208 lb)   BMI 29.02 kg/m²      Physical Exam:  Physical Exam  Vitals reviewed.   Constitutional:       Appearance: Normal appearance. He is not ill-appearing.   Eyes:      Pupils: Pupils are equal, round, and reactive to light.   Cardiovascular:      Rate and Rhythm: Normal rate.      Pulses: Normal pulses.   Pulmonary:      Effort: Pulmonary effort is normal.      Breath sounds: Normal breath sounds.   Neurological:      General: No focal deficit present.      Mental Status: He is alert. Mental status is at baseline.   Psychiatric:         Mood and Affect: Mood normal.         Behavior: Behavior normal.         Thought Content: Thought content normal.         Judgment: Judgment normal.             PHQ-9 Score  PHQ-9 Total Score:      Lab Review  No visits with results within 3 Month(s) from this visit.   Latest known visit with results is:   Admission on 07/20/2024, Discharged on 07/20/2024   Component Date Value Ref Range Status    Glucose 07/20/2024 108 (H)  65 - 99 mg/dL Final    BUN 07/20/2024 11  6 - 20 mg/dL Final    Creatinine 07/20/2024 0.88  0.76 - 1.27 mg/dL Final    Sodium 07/20/2024 140  136 - 145 mmol/L Final    Potassium 07/20/2024 4.0  3.5 - 5.2 mmol/L Final    Slight hemolysis detected by analyzer. Result may be falsely elevated.    Chloride 07/20/2024 103  98 - 107 mmol/L Final    CO2 07/20/2024 27.0  22.0 - 29.0 mmol/L Final    Calcium 07/20/2024 8.4 (L)  8.6 - 10.5 mg/dL Final    Total Protein 07/20/2024 6.1  6.0 - 8.5 g/dL Final    Albumin 07/20/2024 4.1  3.5 - 5.2 g/dL Final    ALT (SGPT) 07/20/2024 29  1 - 41 U/L Final    AST (SGOT) 07/20/2024 22  1 - 40 U/L Final    Alkaline Phosphatase 07/20/2024 59  39 - 117 U/L Final    Total Bilirubin 07/20/2024 0.6  0.0 - 1.2 mg/dL Final    Globulin 07/20/2024 2.0  gm/dL " Final    Calculated Result    A/G Ratio 07/20/2024 2.1  g/dL Final    BUN/Creatinine Ratio 07/20/2024 12.5  7.0 - 25.0 Final    Anion Gap 07/20/2024 10.0  5.0 - 15.0 mmol/L Final    eGFR 07/20/2024 100.3  >60.0 mL/min/1.73 Final    Lipase 07/20/2024 65 (H)  13 - 60 U/L Final    Color, UA 07/20/2024 Dark Yellow (A)  Yellow, Straw Final    Appearance, UA 07/20/2024 Clear  Clear Final    pH, UA 07/20/2024 5.5  5.0 - 8.0 Final    Specific Gravity, UA 07/20/2024 1.026  1.001 - 1.030 Final    Glucose, UA 07/20/2024 Negative  Negative Final    Ketones, UA 07/20/2024 Trace (A)  Negative Final    Bilirubin, UA 07/20/2024 Negative  Negative Final    Blood, UA 07/20/2024 Negative  Negative Final    Protein, UA 07/20/2024 Negative  Negative Final    Leuk Esterase, UA 07/20/2024 Negative  Negative Final    Nitrite, UA 07/20/2024 Negative  Negative Final    Urobilinogen, UA 07/20/2024 1.0 E.U./dL  0.2 - 1.0 E.U./dL Final    Creatinine 07/20/2024 0.90  0.60 - 1.30 mg/dL Final    Serial Number: [id number]:  [id number]    WBC 07/20/2024 6.38  3.40 - 10.80 10*3/mm3 Final    RBC 07/20/2024 4.63  4.14 - 5.80 10*6/mm3 Final    Hemoglobin 07/20/2024 14.6  13.0 - 17.7 g/dL Final    Hematocrit 07/20/2024 42.9  37.5 - 51.0 % Final    MCV 07/20/2024 92.7  79.0 - 97.0 fL Final    MCH 07/20/2024 31.5  26.6 - 33.0 pg Final    MCHC 07/20/2024 34.0  31.5 - 35.7 g/dL Final    RDW 07/20/2024 12.8  12.3 - 15.4 % Final    RDW-SD 07/20/2024 43.3  37.0 - 54.0 fl Final    MPV 07/20/2024 9.4  6.0 - 12.0 fL Final    Platelets 07/20/2024 240  140 - 450 10*3/mm3 Final    Neutrophil % 07/20/2024 57.0  42.7 - 76.0 % Final    Lymphocyte % 07/20/2024 26.5  19.6 - 45.3 % Final    Monocyte % 07/20/2024 13.8 (H)  5.0 - 12.0 % Final    Eosinophil % 07/20/2024 1.9  0.3 - 6.2 % Final    Basophil % 07/20/2024 0.8  0.0 - 1.5 % Final    Immature Grans % 07/20/2024 0.0  0.0 - 0.5 % Final    Neutrophils, Absolute 07/20/2024 3.64  1.70 - 7.00 10*3/mm3 Final    Lymphocytes,  Absolute 07/20/2024 1.69  0.70 - 3.10 10*3/mm3 Final    Monocytes, Absolute 07/20/2024 0.88  0.10 - 0.90 10*3/mm3 Final    Eosinophils, Absolute 07/20/2024 0.12  0.00 - 0.40 10*3/mm3 Final    Basophils, Absolute 07/20/2024 0.05  0.00 - 0.20 10*3/mm3 Final    Immature Grans, Absolute 07/20/2024 0.00  0.00 - 0.05 10*3/mm3 Final    nRBC 07/20/2024 0.0  0.0 - 0.2 /100 WBC Final      The 10-year ASCVD risk score (Zuleyka DK, et al., 2019) is: 7.2%    Values used to calculate the score:      Age: 59 years      Sex: Male      Is Non- : No      Diabetic: No      Tobacco smoker: No      Systolic Blood Pressure: 114 mmHg      Is BP treated: Yes      HDL Cholesterol: 43 mg/dL      Total Cholesterol: 185 mg/dL            Assessment / Plan         Assessment and Plan   Diagnoses and all orders for this visit:    1. Essential hypertension (Primary)  Assessment & Plan:  Hypertension is improving with treatment.  Continue current treatment regimen.  Blood pressure will be reassessed at the next regular appointment.  Advise that their target blood pressure readings are an average of <140/90.  Advise to follow a low salt diet.     Orders:  -     atenolol (TENORMIN) 50 MG tablet; Take 1 tablet by mouth Daily. Indications: High Blood Pressure  Dispense: 90 tablet; Refill: 1  -     triamterene-hydrochlorothiazide (DYAZIDE) 37.5-25 MG per capsule; Take 1 capsule by mouth Daily. Indications: High Blood Pressure  Dispense: 90 capsule; Refill: 1    2. Hyperlipidemia, unspecified hyperlipidemia type  Assessment & Plan:   Lipid abnormalities are stable    Plan:  Lipid lowering therapy not prescribed due to ASCVD risk <7.5%    Discussed medication dosage, use, side effects, and goals of treatment in detail.    Counseled patient on lifestyle modifications to help control hyperlipidemia.     Patient Treatment Goals:   LDL goal is under 100    Followup in 6 months.      3. Gastroesophageal reflux disease, unspecified  whether esophagitis present  -     famotidine (PEPCID) 40 MG tablet; Take 1 tablet by mouth Daily.  Dispense: 90 tablet; Refill: 1  -     omeprazole (priLOSEC) 40 MG capsule; Take 1 capsule by mouth Daily.  Dispense: 90 capsule; Refill: 1    4. Sleep disturbance  -     hydrOXYzine pamoate (Vistaril) 25 MG capsule; Take 1 capsule by mouth At Night As Needed (sleep).  Dispense: 30 capsule; Refill: 2    5. Erectile dysfunction, unspecified erectile dysfunction type  -     sildenafil (VIAGRA) 50 MG tablet; Take 1 tablet by mouth Daily As Needed for Erectile Dysfunction.  Dispense: 90 tablet; Refill: 1    6. Need for vaccination  -     Tdap Vaccine Greater Than or Equal To 8yo IM        Discussed possible differential diagnoses, testing, treatment, recommended non-pharmacological interventions, risks, warning signs to monitor for that would indicate need for follow-up in clinic or ER. If no improvement with these regimens or you have new or worsening symptoms follow-up. Patient verbalizes understanding and agreement with plan of care. Denies further needs or concerns.     Patient was given instructions and counseling regarding her condition and for health maintenance advised.          Health Maintenance  Health Maintenance:   Health Maintenance Due   Topic Date Due    TDAP/TD VACCINES (1 - Tdap) Never done        Meds ordered during this visit  New Medications Ordered This Visit   Medications    famotidine (PEPCID) 40 MG tablet     Sig: Take 1 tablet by mouth Daily.     Dispense:  90 tablet     Refill:  1    omeprazole (priLOSEC) 40 MG capsule     Sig: Take 1 capsule by mouth Daily.     Dispense:  90 capsule     Refill:  1    atenolol (TENORMIN) 50 MG tablet     Sig: Take 1 tablet by mouth Daily. Indications: High Blood Pressure     Dispense:  90 tablet     Refill:  1    triamterene-hydrochlorothiazide (DYAZIDE) 37.5-25 MG per capsule     Sig: Take 1 capsule by mouth Daily. Indications: High Blood Pressure     Dispense:   90 capsule     Refill:  1    hydrOXYzine pamoate (Vistaril) 25 MG capsule     Sig: Take 1 capsule by mouth At Night As Needed (sleep).     Dispense:  30 capsule     Refill:  2    sildenafil (VIAGRA) 50 MG tablet     Sig: Take 1 tablet by mouth Daily As Needed for Erectile Dysfunction.     Dispense:  90 tablet     Refill:  1       Meds stopped during this visit:  Medications Discontinued During This Encounter   Medication Reason    benzonatate (Tessalon Perles) 100 MG capsule *Therapy completed    methylPREDNISolone (MEDROL) 4 MG dose pack *Therapy completed    triamterene-hydrochlorothiazide (DYAZIDE) 37.5-25 MG per capsule Reorder    atenolol (TENORMIN) 50 MG tablet Reorder    sildenafil (VIAGRA) 50 MG tablet Reorder    famotidine (PEPCID) 40 MG tablet Reorder    hydrOXYzine pamoate (Vistaril) 25 MG capsule Reorder    omeprazole (priLOSEC) 40 MG capsule Reorder        Visit Diagnoses    ICD-10-CM ICD-9-CM   1. Essential hypertension  I10 401.9   2. Hyperlipidemia, unspecified hyperlipidemia type  E78.5 272.4   3. Gastroesophageal reflux disease, unspecified whether esophagitis present  K21.9 530.81   4. Sleep disturbance  G47.9 780.50   5. Erectile dysfunction, unspecified erectile dysfunction type  N52.9 607.84   6. Need for vaccination  Z23 V05.9       Patient was given instructions and counseling regarding his condition or for health maintenance advice. Please see specific information pulled into the AVS if appropriate.     Follow Up   Return in about 3 months (around 3/16/2025) for followup HTN, HLD .          This document has been electronically signed by Alissa Tony DO   December 16, 2024 09:01 EST    Dictated Utilizing Dragon Dictation: Part of this note may be an electronic transcription/translation of spoken language to printed text using the Dragon Dictation System.    Alissa Tony D.O.  [facility] [place]

## 2024-12-16 NOTE — LETTER
[facility]  Vaccine Consent Form    Patient Name:  Lobo Yu  Patient :  1965     Vaccine(s) Ordered    Tdap Vaccine Greater Than or Equal To 8yo IM        Screening Checklist  The following questions should be completed prior to vaccination. If you answer “yes” to any question, it does not necessarily mean you should not be vaccinated. It just means we may need to clarify or ask more questions. If a question is unclear, please ask your healthcare provider to explain it.    Yes No   Any fever or moderate to severe illness today (mild illness and/or antibiotic treatment are not contraindications)?     Do you have a history of a serious reaction to any previous vaccinations, such as anaphylaxis, encephalopathy within 7 days, Guillain-Jenkinsburg syndrome within 6 weeks, seizure?     Have you received any live vaccine(s) (e.g MMR, DOMINIC) or any other vaccines in the last month (to ensure duplicate doses aren't given)?     Do you have an anaphylactic allergy to latex (DTaP, DTaP-IPV, Hep A, Hep B, MenB, RV, Td, Tdap), baker’s yeast (Hep B, HPV), polysorbates (RSV, nirsevimab, PCV 20, Rotavirrus, Tdap, Shingrix), or gelatin (DOMINIC, MMR)?     Do you have an anaphylactic allergy to neomycin (Rabies, DOMINIC, MMR, IPV, Hep A), polymyxin B (IPV), or streptomycin (IPV)?      Any cancer, leukemia, AIDS, or other immune system disorder? (DOMINIC, MMR, RV)     Do you have a parent, brother, or sister with an immune system problem (if immune competence of vaccine recipient clinically verified, can proceed)? (MMR, DOMINIC)     Any recent steroid treatments for >2 weeks, chemotherapy, or radiation treatment? (DOMINIC, MMR)     Have you received antibody-containing blood transfusions or IVIG in the past 11 months (recommended interval is dependent on product)? (MMR, DOMINIC)     Have you taken antiviral drugs (acyclovir, famciclovir, valacyclovir for DOMINIC) in the last 24 or 48 hours, respectively?      Are you pregnant or planning to  "become pregnant within 1 month? (DOMINIC, MMR, HPV, IPV, MenB, Abrexvy; For Hep B- refer to Engerix-B; For RSV - Abrysvo is indicated for 32-36 weeks of pregnancy from September to January)     For infants, have you ever been told your child has had intussusception or a medical emergency involving obstruction of the intestine (Rotavirus)? If not for an infant, can skip this question.         *Ordering Physicians/APC should be consulted if \"yes\" is checked by the patient or guardian above.  I have received, read, and understand the Vaccine Information Statement (VIS) for each vaccine ordered.  I have considered my or my child's health status as well as the health status of my close contacts.  I have taken the opportunity to discuss my vaccine questions with my or my child's health care provider.   I have requested that the ordered vaccine(s) be given to me or my child.  I understand the benefits and risks of the vaccines.  I understand that I should remain in the clinic for 15 minutes after receiving the vaccine(s).  _________________________________________________________  Signature of Patient or Parent/Legal Guardian ____________________  Date     "

## 2025-03-31 ENCOUNTER — PRE-ADMISSION TESTING (OUTPATIENT)
Dept: PREADMISSION TESTING | Facility: HOSPITAL | Age: 60
End: 2025-03-31
Payer: COMMERCIAL

## 2025-03-31 ENCOUNTER — OFFICE VISIT (OUTPATIENT)
Dept: FAMILY MEDICINE CLINIC | Facility: CLINIC | Age: 60
End: 2025-03-31
Payer: COMMERCIAL

## 2025-03-31 VITALS
SYSTOLIC BLOOD PRESSURE: 116 MMHG | TEMPERATURE: 97.1 F | HEART RATE: 52 BPM | HEIGHT: 71 IN | OXYGEN SATURATION: 97 % | WEIGHT: 198 LBS | RESPIRATION RATE: 20 BRPM | DIASTOLIC BLOOD PRESSURE: 68 MMHG | BODY MASS INDEX: 27.72 KG/M2

## 2025-03-31 VITALS — BODY MASS INDEX: 27.75 KG/M2 | HEIGHT: 71 IN | WEIGHT: 198.19 LBS

## 2025-03-31 DIAGNOSIS — E78.5 HYPERLIPIDEMIA, UNSPECIFIED HYPERLIPIDEMIA TYPE: Primary | ICD-10-CM

## 2025-03-31 DIAGNOSIS — K21.9 GASTROESOPHAGEAL REFLUX DISEASE, UNSPECIFIED WHETHER ESOPHAGITIS PRESENT: ICD-10-CM

## 2025-03-31 DIAGNOSIS — N52.9 ERECTILE DYSFUNCTION, UNSPECIFIED ERECTILE DYSFUNCTION TYPE: ICD-10-CM

## 2025-03-31 DIAGNOSIS — E55.9 VITAMIN D DEFICIENCY: ICD-10-CM

## 2025-03-31 DIAGNOSIS — G47.9 SLEEP DISTURBANCE: ICD-10-CM

## 2025-03-31 DIAGNOSIS — I10 ESSENTIAL HYPERTENSION: ICD-10-CM

## 2025-03-31 LAB
25(OH)D3 SERPL-MCNC: 28 NG/ML (ref 30–100)
ALBUMIN SERPL-MCNC: 4.7 G/DL (ref 3.5–5.2)
ALBUMIN/GLOB SERPL: 1.5 G/DL
ALP SERPL-CCNC: 78 U/L (ref 39–117)
ALT SERPL W P-5'-P-CCNC: 39 U/L (ref 1–41)
ANION GAP SERPL CALCULATED.3IONS-SCNC: 14 MMOL/L (ref 5–15)
ANION GAP SERPL CALCULATED.3IONS-SCNC: 15 MMOL/L (ref 5–15)
AST SERPL-CCNC: 28 U/L (ref 1–40)
BILIRUB SERPL-MCNC: 0.5 MG/DL (ref 0–1.2)
BUN SERPL-MCNC: 14 MG/DL (ref 6–20)
BUN SERPL-MCNC: 14 MG/DL (ref 6–20)
BUN/CREAT SERPL: 14.6 (ref 7–25)
BUN/CREAT SERPL: 16.7 (ref 7–25)
CALCIUM SPEC-SCNC: 9.3 MG/DL (ref 8.6–10.5)
CALCIUM SPEC-SCNC: 9.5 MG/DL (ref 8.6–10.5)
CHLORIDE SERPL-SCNC: 103 MMOL/L (ref 98–107)
CHLORIDE SERPL-SCNC: 105 MMOL/L (ref 98–107)
CO2 SERPL-SCNC: 23 MMOL/L (ref 22–29)
CO2 SERPL-SCNC: 24 MMOL/L (ref 22–29)
CREAT SERPL-MCNC: 0.84 MG/DL (ref 0.76–1.27)
CREAT SERPL-MCNC: 0.96 MG/DL (ref 0.76–1.27)
DEPRECATED RDW RBC AUTO: 42.8 FL (ref 37–54)
EGFRCR SERPLBLD CKD-EPI 2021: 101.1 ML/MIN/1.73
EGFRCR SERPLBLD CKD-EPI 2021: 91.6 ML/MIN/1.73
ERYTHROCYTE [DISTWIDTH] IN BLOOD BY AUTOMATED COUNT: 13.1 % (ref 12.3–15.4)
GLOBULIN UR ELPH-MCNC: 3.1 GM/DL
GLUCOSE SERPL-MCNC: 135 MG/DL (ref 65–99)
GLUCOSE SERPL-MCNC: 138 MG/DL (ref 65–99)
HBA1C MFR BLD: 5.3 % (ref 4.8–5.6)
HCT VFR BLD AUTO: 42.9 % (ref 37.5–51)
HGB BLD-MCNC: 15.2 G/DL (ref 13–17.7)
MCH RBC QN AUTO: 31.7 PG (ref 26.6–33)
MCHC RBC AUTO-ENTMCNC: 35.4 G/DL (ref 31.5–35.7)
MCV RBC AUTO: 89.6 FL (ref 79–97)
PLATELET # BLD AUTO: 280 10*3/MM3 (ref 140–450)
PMV BLD AUTO: 9.4 FL (ref 6–12)
POTASSIUM SERPL-SCNC: 4.2 MMOL/L (ref 3.5–5.2)
POTASSIUM SERPL-SCNC: 4.2 MMOL/L (ref 3.5–5.2)
PROT SERPL-MCNC: 7.8 G/DL (ref 6–8.5)
QT INTERVAL: 436 MS
QTC INTERVAL: 428 MS
RBC # BLD AUTO: 4.79 10*6/MM3 (ref 4.14–5.8)
SODIUM SERPL-SCNC: 142 MMOL/L (ref 136–145)
SODIUM SERPL-SCNC: 142 MMOL/L (ref 136–145)
WBC NRBC COR # BLD AUTO: 5.8 10*3/MM3 (ref 3.4–10.8)

## 2025-03-31 PROCEDURE — 83036 HEMOGLOBIN GLYCOSYLATED A1C: CPT

## 2025-03-31 PROCEDURE — 93005 ELECTROCARDIOGRAM TRACING: CPT

## 2025-03-31 PROCEDURE — 85027 COMPLETE CBC AUTOMATED: CPT

## 2025-03-31 PROCEDURE — 36415 COLL VENOUS BLD VENIPUNCTURE: CPT

## 2025-03-31 PROCEDURE — 82306 VITAMIN D 25 HYDROXY: CPT

## 2025-03-31 PROCEDURE — 99214 OFFICE O/P EST MOD 30 MIN: CPT | Performed by: FAMILY MEDICINE

## 2025-03-31 PROCEDURE — 80053 COMPREHEN METABOLIC PANEL: CPT

## 2025-03-31 RX ORDER — FAMOTIDINE 40 MG/1
40 TABLET, FILM COATED ORAL DAILY
Qty: 90 TABLET | Refills: 1 | Status: SHIPPED | OUTPATIENT
Start: 2025-03-31

## 2025-03-31 RX ORDER — ATENOLOL 50 MG/1
50 TABLET ORAL DAILY
Qty: 90 TABLET | Refills: 1 | Status: SHIPPED | OUTPATIENT
Start: 2025-03-31

## 2025-03-31 RX ORDER — TRIAMTERENE AND HYDROCHLOROTHIAZIDE 37.5; 25 MG/1; MG/1
1 CAPSULE ORAL DAILY
Qty: 90 CAPSULE | Refills: 1 | Status: SHIPPED | OUTPATIENT
Start: 2025-03-31

## 2025-03-31 RX ORDER — OMEPRAZOLE 40 MG/1
40 CAPSULE, DELAYED RELEASE ORAL DAILY
Qty: 90 CAPSULE | Refills: 1 | Status: SHIPPED | OUTPATIENT
Start: 2025-03-31

## 2025-03-31 RX ORDER — SILDENAFIL 50 MG/1
50 TABLET, FILM COATED ORAL DAILY PRN
Qty: 90 TABLET | Refills: 1 | Status: SHIPPED | OUTPATIENT
Start: 2025-03-31

## 2025-03-31 RX ORDER — FLUTICASONE PROPIONATE 50 MCG
1 SPRAY, SUSPENSION (ML) NASAL DAILY
Qty: 15.8 G | Refills: 5 | Status: SHIPPED | OUTPATIENT
Start: 2025-03-31

## 2025-03-31 RX ORDER — HYDROXYZINE PAMOATE 25 MG/1
25 CAPSULE ORAL NIGHTLY PRN
Qty: 30 CAPSULE | Refills: 2 | Status: SHIPPED | OUTPATIENT
Start: 2025-03-31

## 2025-03-31 NOTE — PROGRESS NOTES
Subjective     Chief Complaint  Hypertension    Subjective          Lobo Yu is a 59 y.o. male who presents today to [facility] for follow up.    HPI:   History of Present Illness    History of Present Illness  The patient is a 59-year-old male who presents today for a follow-up visit.    He has a past medical history of hypertension, perforated diverticulum of the intestine, had colostomy takedown 6/13/2024, GERD    He has been maintaining a stable condition on his current antihypertensive regimen, which includes triamterene with HCTZ and atenolol 50 mg. This treatment plan has been effective for several years, with no recent adjustments required.    He continues to manage his gastrointestinal symptoms with Prilosec and Pepcid, which have proven beneficial.    For his allergy issues, he uses Flonase, which has been effective.    He also takes Vistaril as needed to aid in sleep, which has been helpful.    He does not require a refill of valacyclovir at this time. He requests that his sildenafil prescription be sent to [facility] in [place], while the rest of his medications can be refilled at [facility] in [place].    MEDICATIONS  Triamterene with HCTZ, atenolol 50 mg, Prilosec, Pepcid, Flonase, Vistaril, valacyclovir, sildenafil    The following portions of the patient's history were reviewed and updated as appropriate: allergies, current medications, past family history, past medical history, past social history, past surgical history and problem list.    Objective     Objective     Allergy:   Allergies   Allergen Reactions    Erythromycin GI Intolerance        Current Medications:   Current Outpatient Medications   Medication Sig Dispense Refill    aspirin 81 MG EC tablet Take 1 tablet by mouth Daily. OTC      atenolol (TENORMIN) 50 MG tablet Take 1 tablet by mouth Daily. Indications: High Blood Pressure 90 tablet 1    famotidine (PEPCID) 40 MG tablet Take 1 tablet  by mouth Daily. 90 tablet 1    fluticasone (FLONASE) 50 MCG/ACT nasal spray Administer 1 spray into the nostril(s) as directed by provider Daily. 15.8 g 5    hydrOXYzine pamoate (Vistaril) 25 MG capsule Take 1 capsule by mouth At Night As Needed (sleep). 30 capsule 2    omeprazole (priLOSEC) 40 MG capsule Take 1 capsule by mouth Daily. 90 capsule 1    sildenafil (VIAGRA) 50 MG tablet Take 1 tablet by mouth Daily As Needed for Erectile Dysfunction. 90 tablet 1    triamterene-hydrochlorothiazide (DYAZIDE) 37.5-25 MG per capsule Take 1 capsule by mouth Daily. Indications: High Blood Pressure 90 capsule 1    valACYclovir (VALTREX) 1000 MG tablet TAKE 1 TABLET BY MOUTH TWICE A  tablet 3     No current facility-administered medications for this visit.       Past Medical History:  Past Medical History:   Diagnosis Date    Diverticulitis     GERD (gastroesophageal reflux disease)     Hypertension     Kidney stones     X2 EPISODES    Wears glasses        Past Surgical History:  Past Surgical History:   Procedure Laterality Date    APPENDECTOMY      COLONOSCOPY      COLOSTOMY CLOSURE N/A 6/13/2024    Procedure: OPEN COLOSTOMY TAKEDOWN;  Surgeon: Abad Botello MD;  Location:  [facility];  Service: General;  Laterality: N/A;    CYSTOSCOPY Bilateral 6/13/2024    Procedure: CYSTOSCOPY TEMPORARY PLACEMENT BILATERAL URETERAL CATHETER;  Surgeon: Mitchell Hoffmann MD;  Location:  [facility];  Service: Urology;  Laterality: Bilateral;    EXPLORATORY LAPAROTOMY N/A 11/20/2023    Procedure: EXPLORATORY LAPAROTOMY, SIGMOID COLECTOMY WITH COLOSTOMY CREAION, INTRA-ABDOMINAL ABSCESS DRAINAGE, APPENDECTOMY;  Surgeon: Hitesh Vallecillo MD;  Location:  [facility];  Service: General;  Laterality: N/A;    NEPHROLITHOTOMY      TONSILLECTOMY      WISDOM TOOTH EXTRACTION         Social History:  Social History     Socioeconomic History    Marital status:    Tobacco Use    Smoking status: Former     Current packs/day: 0.50     Average  "packs/day: 0.5 packs/day for 31.2 years (15.6 ttl pk-yrs)     Types: Cigarettes     Start date: 1/1/1994     Quit date: 1/11/1981     Passive exposure: Past    Smokeless tobacco: Never   Vaping Use    Vaping status: Never Used   Substance and Sexual Activity    Alcohol use: Not Currently     Comment: Socially    Drug use: Never    Sexual activity: Defer       Family History:  History reviewed. No pertinent family history.        Vital Signs:   /68 (BP Location: Right arm, Patient Position: Sitting, Cuff Size: Adult)   Pulse 52   Temp 97.1 °F (36.2 °C) (Temporal)   Resp 20   Ht 180.3 cm (70.98\")   Wt 89.8 kg (198 lb)   SpO2 97%   BMI 27.63 kg/m²      Physical Exam:  Physical Exam  Vitals reviewed.   Constitutional:       Appearance: He is not ill-appearing.   Cardiovascular:      Rate and Rhythm: Normal rate.      Pulses: Normal pulses.   Pulmonary:      Effort: Pulmonary effort is normal.      Breath sounds: Normal breath sounds.   Neurological:      General: No focal deficit present.      Mental Status: He is alert. Mental status is at baseline.   Psychiatric:         Mood and Affect: Mood normal.         Behavior: Behavior normal.         Thought Content: Thought content normal.         Judgment: Judgment normal.               PHQ-9 Score  PHQ-9 Total Score:      Lab Review  No visits with results within 3 Month(s) from this visit.   Latest known visit with results is:   Admission on 07/20/2024, Discharged on 07/20/2024   Component Date Value Ref Range Status    Glucose 07/20/2024 108 (H)  65 - 99 mg/dL Final    BUN 07/20/2024 11  6 - 20 mg/dL Final    Creatinine 07/20/2024 0.88  0.76 - 1.27 mg/dL Final    Sodium 07/20/2024 140  136 - 145 mmol/L Final    Potassium 07/20/2024 4.0  3.5 - 5.2 mmol/L Final    Slight hemolysis detected by analyzer. Result may be falsely elevated.    Chloride 07/20/2024 103  98 - 107 mmol/L Final    CO2 07/20/2024 27.0  22.0 - 29.0 mmol/L Final    Calcium 07/20/2024 8.4 (L) "  8.6 - 10.5 mg/dL Final    Total Protein 07/20/2024 6.1  6.0 - 8.5 g/dL Final    Albumin 07/20/2024 4.1  3.5 - 5.2 g/dL Final    ALT (SGPT) 07/20/2024 29  1 - 41 U/L Final    AST (SGOT) 07/20/2024 22  1 - 40 U/L Final    Alkaline Phosphatase 07/20/2024 59  39 - 117 U/L Final    Total Bilirubin 07/20/2024 0.6  0.0 - 1.2 mg/dL Final    Globulin 07/20/2024 2.0  gm/dL Final    Calculated Result    A/G Ratio 07/20/2024 2.1  g/dL Final    BUN/Creatinine Ratio 07/20/2024 12.5  7.0 - 25.0 Final    Anion Gap 07/20/2024 10.0  5.0 - 15.0 mmol/L Final    eGFR 07/20/2024 100.3  >60.0 mL/min/1.73 Final    Lipase 07/20/2024 65 (H)  13 - 60 U/L Final    Color, UA 07/20/2024 Dark Yellow (A)  Yellow, Straw Final    Appearance, UA 07/20/2024 Clear  Clear Final    pH, UA 07/20/2024 5.5  5.0 - 8.0 Final    Specific Gravity, UA 07/20/2024 1.026  1.001 - 1.030 Final    Glucose, UA 07/20/2024 Negative  Negative Final    Ketones, UA 07/20/2024 Trace (A)  Negative Final    Bilirubin, UA 07/20/2024 Negative  Negative Final    Blood, UA 07/20/2024 Negative  Negative Final    Protein, UA 07/20/2024 Negative  Negative Final    Leuk Esterase, UA 07/20/2024 Negative  Negative Final    Nitrite, UA 07/20/2024 Negative  Negative Final    Urobilinogen, UA 07/20/2024 1.0 E.U./dL  0.2 - 1.0 E.U./dL Final    Creatinine 07/20/2024 0.90  0.60 - 1.30 mg/dL Final    Serial Number: [id number]:  [id number]    WBC 07/20/2024 6.38  3.40 - 10.80 10*3/mm3 Final    RBC 07/20/2024 4.63  4.14 - 5.80 10*6/mm3 Final    Hemoglobin 07/20/2024 14.6  13.0 - 17.7 g/dL Final    Hematocrit 07/20/2024 42.9  37.5 - 51.0 % Final    MCV 07/20/2024 92.7  79.0 - 97.0 fL Final    MCH 07/20/2024 31.5  26.6 - 33.0 pg Final    MCHC 07/20/2024 34.0  31.5 - 35.7 g/dL Final    RDW 07/20/2024 12.8  12.3 - 15.4 % Final    RDW-SD 07/20/2024 43.3  37.0 - 54.0 fl Final    MPV 07/20/2024 9.4  6.0 - 12.0 fL Final    Platelets 07/20/2024 240  140 - 450 10*3/mm3 Final    Neutrophil % 07/20/2024  57.0  42.7 - 76.0 % Final    Lymphocyte % 07/20/2024 26.5  19.6 - 45.3 % Final    Monocyte % 07/20/2024 13.8 (H)  5.0 - 12.0 % Final    Eosinophil % 07/20/2024 1.9  0.3 - 6.2 % Final    Basophil % 07/20/2024 0.8  0.0 - 1.5 % Final    Immature Grans % 07/20/2024 0.0  0.0 - 0.5 % Final    Neutrophils, Absolute 07/20/2024 3.64  1.70 - 7.00 10*3/mm3 Final    Lymphocytes, Absolute 07/20/2024 1.69  0.70 - 3.10 10*3/mm3 Final    Monocytes, Absolute 07/20/2024 0.88  0.10 - 0.90 10*3/mm3 Final    Eosinophils, Absolute 07/20/2024 0.12  0.00 - 0.40 10*3/mm3 Final    Basophils, Absolute 07/20/2024 0.05  0.00 - 0.20 10*3/mm3 Final    Immature Grans, Absolute 07/20/2024 0.00  0.00 - 0.05 10*3/mm3 Final    nRBC 07/20/2024 0.0  0.0 - 0.2 /100 WBC Final        Radiology Results        Assessment / Plan         Assessment and Plan   Diagnoses and all orders for this visit:    1. Hyperlipidemia, unspecified hyperlipidemia type (Primary)  -     CBC & Differential; Future  -     Lipid Panel; Future    2. Essential hypertension  -     atenolol (TENORMIN) 50 MG tablet; Take 1 tablet by mouth Daily. Indications: High Blood Pressure  Dispense: 90 tablet; Refill: 1  -     triamterene-hydrochlorothiazide (DYAZIDE) 37.5-25 MG per capsule; Take 1 capsule by mouth Daily. Indications: High Blood Pressure  Dispense: 90 capsule; Refill: 1  -     CBC & Differential; Future  -     Comprehensive Metabolic Panel; Future  -     Lipid Panel; Future    3. Gastroesophageal reflux disease, unspecified whether esophagitis present  -     famotidine (PEPCID) 40 MG tablet; Take 1 tablet by mouth Daily.  Dispense: 90 tablet; Refill: 1  -     omeprazole (priLOSEC) 40 MG capsule; Take 1 capsule by mouth Daily.  Dispense: 90 capsule; Refill: 1    4. Sleep disturbance  -     hydrOXYzine pamoate (Vistaril) 25 MG capsule; Take 1 capsule by mouth At Night As Needed (sleep).  Dispense: 30 capsule; Refill: 2    5. Erectile dysfunction, unspecified erectile dysfunction  type  -     sildenafil (VIAGRA) 50 MG tablet; Take 1 tablet by mouth Daily As Needed for Erectile Dysfunction.  Dispense: 90 tablet; Refill: 1    6. Vitamin D deficiency  -     Vitamin D,25-Hydroxy; Future    Other orders  -     fluticasone (FLONASE) 50 MCG/ACT nasal spray; Administer 1 spray into the nostril(s) as directed by provider Daily.  Dispense: 15.8 g; Refill: 5        Assessment & Plan  1. Hypertension.  His blood pressure is well-controlled at 116/68 mmHg. He will continue his current regimen of triamterene with HCTZ and atenolol 50 mg. Refills for these medications have been provided.    2. Elevated cholesterol.  His cholesterol levels were slightly elevated last year but not to the extent that medication was required. Continued monitoring of cholesterol levels is recommended.    3. Gastroesophageal reflux disease (GERD).  He is currently taking Prilosec and Pepcid, which effectively manage his symptoms. He will continue with these medications.    4. Allergic rhinitis.  He is using Flonase for allergy symptoms, which appears to be effective. He will continue with this medication.    5. Insomnia.  He is taking Vistaril to help with rest as needed. He will continue with this medication.    6. Medication management.  He does not need a refill for valacyclovir. A prescription for sildenafil has been sent to [facility] on [address]. The rest of his medications will be refilled at [facility] on [address].    7. Health maintenance.  He is scheduled for preadmission testing today. Orders for routine blood work have been placed, which can be done concurrently with his preadmission testing.    Follow-up  The patient will follow up in 6 months or earlier if necessary.      Discussed possible differential diagnoses, testing, treatment, recommended non-pharmacological interventions, risks, warning signs to monitor for that would indicate need for follow-up in clinic or ER. If no improvement with these regimens or you have new  or worsening symptoms follow-up. Patient verbalizes understanding and agreement with plan of care. Denies further needs or concerns.     Patient was given instructions and counseling regarding her condition and for health maintenance advised.           Health Maintenance  Health Maintenance:   Health Maintenance Due   Topic Date Due    ANNUAL PHYSICAL  03/25/2025    LIPID PANEL  03/25/2025        Meds ordered during this visit  New Medications Ordered This Visit   Medications    atenolol (TENORMIN) 50 MG tablet     Sig: Take 1 tablet by mouth Daily. Indications: High Blood Pressure     Dispense:  90 tablet     Refill:  1    triamterene-hydrochlorothiazide (DYAZIDE) 37.5-25 MG per capsule     Sig: Take 1 capsule by mouth Daily. Indications: High Blood Pressure     Dispense:  90 capsule     Refill:  1    famotidine (PEPCID) 40 MG tablet     Sig: Take 1 tablet by mouth Daily.     Dispense:  90 tablet     Refill:  1    omeprazole (priLOSEC) 40 MG capsule     Sig: Take 1 capsule by mouth Daily.     Dispense:  90 capsule     Refill:  1    fluticasone (FLONASE) 50 MCG/ACT nasal spray     Sig: Administer 1 spray into the nostril(s) as directed by provider Daily.     Dispense:  15.8 g     Refill:  5    hydrOXYzine pamoate (Vistaril) 25 MG capsule     Sig: Take 1 capsule by mouth At Night As Needed (sleep).     Dispense:  30 capsule     Refill:  2    sildenafil (VIAGRA) 50 MG tablet     Sig: Take 1 tablet by mouth Daily As Needed for Erectile Dysfunction.     Dispense:  90 tablet     Refill:  1       Meds stopped during this visit:  Medications Discontinued During This Encounter   Medication Reason    fluticasone (FLONASE) 50 MCG/ACT nasal spray Reorder    famotidine (PEPCID) 40 MG tablet Reorder    omeprazole (priLOSEC) 40 MG capsule Reorder    atenolol (TENORMIN) 50 MG tablet Reorder    triamterene-hydrochlorothiazide (DYAZIDE) 37.5-25 MG per capsule Reorder    hydrOXYzine pamoate (Vistaril) 25 MG capsule Reorder     sildenafil (VIAGRA) 50 MG tablet Reorder        Visit Diagnoses    ICD-10-CM ICD-9-CM   1. Hyperlipidemia, unspecified hyperlipidemia type  E78.5 272.4   2. Essential hypertension  I10 401.9   3. Gastroesophageal reflux disease, unspecified whether esophagitis present  K21.9 530.81   4. Sleep disturbance  G47.9 780.50   5. Erectile dysfunction, unspecified erectile dysfunction type  N52.9 607.84   6. Vitamin D deficiency  E55.9 268.9       Patient was given instructions and counseling regarding his condition or for health maintenance advice. Please see specific information pulled into the AVS if appropriate.     Follow Up   Return in about 6 months (around 9/30/2025) for followup HTN, HLD .      Patient or patient representative verbalized consent for the use of Ambient Listening during the visit with  Alissa Tony DO for chart documentation. 3/31/2025  08:33 EDT      This document has been electronically signed by Alissa Tony DO   March 31, 2025 08:43 EDT    Dictated Utilizing Dragon Dictation: Part of this note may be an electronic transcription/translation of spoken language to printed text using the Dragon Dictation System.    Alissa Tony D.O.  [facility]

## 2025-04-07 ENCOUNTER — ANESTHESIA EVENT (OUTPATIENT)
Dept: PERIOP | Facility: HOSPITAL | Age: 60
End: 2025-04-07
Payer: COMMERCIAL

## 2025-04-08 ENCOUNTER — HOSPITAL ENCOUNTER (OUTPATIENT)
Facility: HOSPITAL | Age: 60
Discharge: HOME | End: 2025-04-09
Attending: SURGERY | Admitting: SURGERY
Payer: COMMERCIAL

## 2025-04-08 ENCOUNTER — ANESTHESIA (OUTPATIENT)
Dept: PERIOP | Facility: HOSPITAL | Age: 60
End: 2025-04-08
Payer: COMMERCIAL

## 2025-04-08 ENCOUNTER — ANESTHESIA EVENT CONVERTED (OUTPATIENT)
Dept: ANESTHESIOLOGY | Facility: HOSPITAL | Age: 60
End: 2025-04-08
Payer: COMMERCIAL

## 2025-04-08 DIAGNOSIS — K43.2 INCISIONAL HERNIA, WITHOUT OBSTRUCTION OR GANGRENE: Primary | ICD-10-CM

## 2025-04-08 LAB — POTASSIUM SERPL-SCNC: 3.8 MMOL/L (ref 3.5–5.2)

## 2025-04-08 PROCEDURE — 25010000002 HYDROMORPHONE 1 MG/ML SOLUTION

## 2025-04-08 PROCEDURE — 25010000002 LIDOCAINE PF 1% 1 % SOLUTION: Performed by: NURSE ANESTHETIST, CERTIFIED REGISTERED

## 2025-04-08 PROCEDURE — 84132 ASSAY OF SERUM POTASSIUM: CPT | Performed by: STUDENT IN AN ORGANIZED HEALTH CARE EDUCATION/TRAINING PROGRAM

## 2025-04-08 PROCEDURE — 25810000003 LACTATED RINGERS PER 1000 ML: Performed by: STUDENT IN AN ORGANIZED HEALTH CARE EDUCATION/TRAINING PROGRAM

## 2025-04-08 PROCEDURE — 25010000002 CEFAZOLIN PER 500 MG: Performed by: SURGERY

## 2025-04-08 PROCEDURE — 25010000002 ONDANSETRON PER 1 MG: Performed by: NURSE ANESTHETIST, CERTIFIED REGISTERED

## 2025-04-08 PROCEDURE — 25010000002 BUPIVACAINE (PF) 0.25 % SOLUTION: Performed by: NURSE ANESTHETIST, CERTIFIED REGISTERED

## 2025-04-08 PROCEDURE — 25010000002 GLYCOPYRROLATE 1 MG/5ML SOLUTION: Performed by: NURSE ANESTHETIST, CERTIFIED REGISTERED

## 2025-04-08 PROCEDURE — 25010000002 FENTANYL CITRATE (PF) 100 MCG/2ML SOLUTION: Performed by: NURSE ANESTHETIST, CERTIFIED REGISTERED

## 2025-04-08 PROCEDURE — 25010000002 NEOSTIGMINE 10 MG/10ML SOLUTION: Performed by: NURSE ANESTHETIST, CERTIFIED REGISTERED

## 2025-04-08 PROCEDURE — 25010000002 PROPOFOL 10 MG/ML EMULSION: Performed by: NURSE ANESTHETIST, CERTIFIED REGISTERED

## 2025-04-08 PROCEDURE — 25010000002 DEXAMETHASONE SODIUM PHOSPHATE 10 MG/ML SOLUTION: Performed by: NURSE ANESTHETIST, CERTIFIED REGISTERED

## 2025-04-08 PROCEDURE — C1781 MESH (IMPLANTABLE): HCPCS | Performed by: SURGERY

## 2025-04-08 DEVICE — MESH FLUT SHT 3X6IN: Type: IMPLANTABLE DEVICE | Site: ABDOMEN | Status: FUNCTIONAL

## 2025-04-08 RX ORDER — PANTOPRAZOLE SODIUM 40 MG/1
40 TABLET, DELAYED RELEASE ORAL
Status: DISCONTINUED | OUTPATIENT
Start: 2025-04-09 | End: 2025-04-09 | Stop reason: HOSPADM

## 2025-04-08 RX ORDER — BISACODYL 5 MG/1
10 TABLET, DELAYED RELEASE ORAL DAILY
Status: DISCONTINUED | OUTPATIENT
Start: 2025-04-08 | End: 2025-04-09 | Stop reason: HOSPADM

## 2025-04-08 RX ORDER — ONDANSETRON 2 MG/ML
4 INJECTION INTRAMUSCULAR; INTRAVENOUS ONCE AS NEEDED
Status: DISCONTINUED | OUTPATIENT
Start: 2025-04-08 | End: 2025-04-08

## 2025-04-08 RX ORDER — HEPARIN SODIUM 5000 [USP'U]/ML
5000 INJECTION, SOLUTION INTRAVENOUS; SUBCUTANEOUS EVERY 8 HOURS SCHEDULED
Status: DISCONTINUED | OUTPATIENT
Start: 2025-04-09 | End: 2025-04-09 | Stop reason: HOSPADM

## 2025-04-08 RX ORDER — SODIUM CHLORIDE 0.9 % (FLUSH) 0.9 %
10 SYRINGE (ML) INJECTION EVERY 12 HOURS SCHEDULED
Status: DISCONTINUED | OUTPATIENT
Start: 2025-04-08 | End: 2025-04-08 | Stop reason: HOSPADM

## 2025-04-08 RX ORDER — LIDOCAINE HYDROCHLORIDE 10 MG/ML
INJECTION, SOLUTION EPIDURAL; INFILTRATION; INTRACAUDAL; PERINEURAL AS NEEDED
Status: DISCONTINUED | OUTPATIENT
Start: 2025-04-08 | End: 2025-04-08 | Stop reason: SURG

## 2025-04-08 RX ORDER — NEOSTIGMINE METHYLSULFATE 1 MG/ML
INJECTION INTRAVENOUS AS NEEDED
Status: DISCONTINUED | OUTPATIENT
Start: 2025-04-08 | End: 2025-04-08 | Stop reason: SURG

## 2025-04-08 RX ORDER — PROMETHAZINE HYDROCHLORIDE 12.5 MG/1
12.5 SUPPOSITORY RECTAL EVERY 6 HOURS PRN
Status: DISCONTINUED | OUTPATIENT
Start: 2025-04-08 | End: 2025-04-09 | Stop reason: HOSPADM

## 2025-04-08 RX ORDER — FLUTICASONE PROPIONATE 50 MCG
1 SPRAY, SUSPENSION (ML) NASAL DAILY
Status: DISCONTINUED | OUTPATIENT
Start: 2025-04-09 | End: 2025-04-09 | Stop reason: HOSPADM

## 2025-04-08 RX ORDER — PROPOFOL 10 MG/ML
VIAL (ML) INTRAVENOUS AS NEEDED
Status: DISCONTINUED | OUTPATIENT
Start: 2025-04-08 | End: 2025-04-08 | Stop reason: SURG

## 2025-04-08 RX ORDER — ACETAMINOPHEN 325 MG/1
650 TABLET ORAL EVERY 4 HOURS PRN
Status: DISCONTINUED | OUTPATIENT
Start: 2025-04-08 | End: 2025-04-09 | Stop reason: HOSPADM

## 2025-04-08 RX ORDER — NALOXONE HCL 0.4 MG/ML
0.4 VIAL (ML) INJECTION
Status: DISCONTINUED | OUTPATIENT
Start: 2025-04-08 | End: 2025-04-09 | Stop reason: HOSPADM

## 2025-04-08 RX ORDER — LIDOCAINE HYDROCHLORIDE 10 MG/ML
0.5 INJECTION, SOLUTION EPIDURAL; INFILTRATION; INTRACAUDAL; PERINEURAL ONCE AS NEEDED
Status: DISCONTINUED | OUTPATIENT
Start: 2025-04-08 | End: 2025-04-08 | Stop reason: HOSPADM

## 2025-04-08 RX ORDER — DOCUSATE SODIUM 100 MG/1
100 CAPSULE, LIQUID FILLED ORAL 2 TIMES DAILY
Status: DISCONTINUED | OUTPATIENT
Start: 2025-04-08 | End: 2025-04-09 | Stop reason: HOSPADM

## 2025-04-08 RX ORDER — FAMOTIDINE 10 MG/ML
20 INJECTION, SOLUTION INTRAVENOUS ONCE
Status: DISCONTINUED | OUTPATIENT
Start: 2025-04-08 | End: 2025-04-08 | Stop reason: HOSPADM

## 2025-04-08 RX ORDER — ROCURONIUM BROMIDE 10 MG/ML
INJECTION, SOLUTION INTRAVENOUS AS NEEDED
Status: DISCONTINUED | OUTPATIENT
Start: 2025-04-08 | End: 2025-04-08 | Stop reason: SURG

## 2025-04-08 RX ORDER — BUPIVACAINE HYDROCHLORIDE 2.5 MG/ML
INJECTION, SOLUTION EPIDURAL; INFILTRATION; INTRACAUDAL; PERINEURAL
Status: COMPLETED | OUTPATIENT
Start: 2025-04-08 | End: 2025-04-08

## 2025-04-08 RX ORDER — MORPHINE SULFATE 1 MG/ML
2 INJECTION, SOLUTION EPIDURAL; INTRATHECAL; INTRAVENOUS
Status: DISCONTINUED | OUTPATIENT
Start: 2025-04-08 | End: 2025-04-09 | Stop reason: HOSPADM

## 2025-04-08 RX ORDER — MAGNESIUM HYDROXIDE 1200 MG/15ML
LIQUID ORAL AS NEEDED
Status: DISCONTINUED | OUTPATIENT
Start: 2025-04-08 | End: 2025-04-08 | Stop reason: HOSPADM

## 2025-04-08 RX ORDER — HYDROMORPHONE HYDROCHLORIDE 1 MG/ML
0.5 INJECTION, SOLUTION INTRAMUSCULAR; INTRAVENOUS; SUBCUTANEOUS
Status: DISCONTINUED | OUTPATIENT
Start: 2025-04-08 | End: 2025-04-08

## 2025-04-08 RX ORDER — DEXMEDETOMIDINE HYDROCHLORIDE 100 UG/ML
INJECTION, SOLUTION INTRAVENOUS AS NEEDED
Status: DISCONTINUED | OUTPATIENT
Start: 2025-04-08 | End: 2025-04-08 | Stop reason: SURG

## 2025-04-08 RX ORDER — DEXAMETHASONE SODIUM PHOSPHATE 10 MG/ML
INJECTION, SOLUTION INTRAMUSCULAR; INTRAVENOUS
Status: COMPLETED | OUTPATIENT
Start: 2025-04-08 | End: 2025-04-08

## 2025-04-08 RX ORDER — ASPIRIN 81 MG/1
81 TABLET ORAL DAILY
Status: DISCONTINUED | OUTPATIENT
Start: 2025-04-09 | End: 2025-04-09 | Stop reason: HOSPADM

## 2025-04-08 RX ORDER — SIMETHICONE 80 MG
80 TABLET,CHEWABLE ORAL 4 TIMES DAILY PRN
Status: DISCONTINUED | OUTPATIENT
Start: 2025-04-08 | End: 2025-04-09 | Stop reason: HOSPADM

## 2025-04-08 RX ORDER — MIDAZOLAM HYDROCHLORIDE 1 MG/ML
1 INJECTION, SOLUTION INTRAMUSCULAR; INTRAVENOUS
Status: DISCONTINUED | OUTPATIENT
Start: 2025-04-08 | End: 2025-04-08 | Stop reason: HOSPADM

## 2025-04-08 RX ORDER — ATENOLOL 50 MG/1
50 TABLET ORAL DAILY
Status: DISCONTINUED | OUTPATIENT
Start: 2025-04-09 | End: 2025-04-09 | Stop reason: HOSPADM

## 2025-04-08 RX ORDER — SODIUM CHLORIDE, SODIUM LACTATE, POTASSIUM CHLORIDE, CALCIUM CHLORIDE 600; 310; 30; 20 MG/100ML; MG/100ML; MG/100ML; MG/100ML
9 INJECTION, SOLUTION INTRAVENOUS CONTINUOUS
Status: DISCONTINUED | OUTPATIENT
Start: 2025-04-09 | End: 2025-04-08

## 2025-04-08 RX ORDER — OXYCODONE AND ACETAMINOPHEN 5; 325 MG/1; MG/1
2 TABLET ORAL EVERY 4 HOURS PRN
Status: DISCONTINUED | OUTPATIENT
Start: 2025-04-08 | End: 2025-04-09 | Stop reason: HOSPADM

## 2025-04-08 RX ORDER — FAMOTIDINE 20 MG/1
20 TABLET, FILM COATED ORAL ONCE
Status: COMPLETED | OUTPATIENT
Start: 2025-04-08 | End: 2025-04-08

## 2025-04-08 RX ORDER — FENTANYL CITRATE 50 UG/ML
INJECTION, SOLUTION INTRAMUSCULAR; INTRAVENOUS AS NEEDED
Status: DISCONTINUED | OUTPATIENT
Start: 2025-04-08 | End: 2025-04-08 | Stop reason: SURG

## 2025-04-08 RX ORDER — PROMETHAZINE HYDROCHLORIDE 12.5 MG/1
12.5 TABLET ORAL EVERY 6 HOURS PRN
Status: DISCONTINUED | OUTPATIENT
Start: 2025-04-08 | End: 2025-04-09 | Stop reason: HOSPADM

## 2025-04-08 RX ORDER — TRIAMTERENE AND HYDROCHLOROTHIAZIDE 37.5; 25 MG/1; MG/1
1 TABLET ORAL DAILY
Status: DISCONTINUED | OUTPATIENT
Start: 2025-04-09 | End: 2025-04-09 | Stop reason: HOSPADM

## 2025-04-08 RX ORDER — ACETAMINOPHEN 650 MG/1
650 SUPPOSITORY RECTAL EVERY 4 HOURS PRN
Status: DISCONTINUED | OUTPATIENT
Start: 2025-04-08 | End: 2025-04-09 | Stop reason: HOSPADM

## 2025-04-08 RX ORDER — FENTANYL CITRATE 50 UG/ML
50 INJECTION, SOLUTION INTRAMUSCULAR; INTRAVENOUS
Status: DISCONTINUED | OUTPATIENT
Start: 2025-04-08 | End: 2025-04-08

## 2025-04-08 RX ORDER — GLYCOPYRROLATE 0.2 MG/ML
INJECTION INTRAMUSCULAR; INTRAVENOUS AS NEEDED
Status: DISCONTINUED | OUTPATIENT
Start: 2025-04-08 | End: 2025-04-08 | Stop reason: SURG

## 2025-04-08 RX ORDER — ONDANSETRON 2 MG/ML
INJECTION INTRAMUSCULAR; INTRAVENOUS AS NEEDED
Status: DISCONTINUED | OUTPATIENT
Start: 2025-04-08 | End: 2025-04-08 | Stop reason: SURG

## 2025-04-08 RX ORDER — ONDANSETRON 2 MG/ML
4 INJECTION INTRAMUSCULAR; INTRAVENOUS EVERY 6 HOURS PRN
Status: DISCONTINUED | OUTPATIENT
Start: 2025-04-08 | End: 2025-04-09 | Stop reason: HOSPADM

## 2025-04-08 RX ORDER — SODIUM CHLORIDE, SODIUM LACTATE, POTASSIUM CHLORIDE, CALCIUM CHLORIDE 600; 310; 30; 20 MG/100ML; MG/100ML; MG/100ML; MG/100ML
75 INJECTION, SOLUTION INTRAVENOUS CONTINUOUS
Status: DISCONTINUED | OUTPATIENT
Start: 2025-04-08 | End: 2025-04-09 | Stop reason: HOSPADM

## 2025-04-08 RX ORDER — ONDANSETRON 4 MG/1
4 TABLET, ORALLY DISINTEGRATING ORAL EVERY 6 HOURS PRN
Status: DISCONTINUED | OUTPATIENT
Start: 2025-04-08 | End: 2025-04-09 | Stop reason: HOSPADM

## 2025-04-08 RX ORDER — SODIUM CHLORIDE 0.9 % (FLUSH) 0.9 %
10 SYRINGE (ML) INJECTION AS NEEDED
Status: DISCONTINUED | OUTPATIENT
Start: 2025-04-08 | End: 2025-04-08 | Stop reason: HOSPADM

## 2025-04-08 RX ADMIN — FENTANYL CITRATE 100 MCG: 50 INJECTION, SOLUTION INTRAMUSCULAR; INTRAVENOUS at 15:19

## 2025-04-08 RX ADMIN — ONDANSETRON 4 MG: 2 INJECTION INTRAMUSCULAR; INTRAVENOUS at 16:10

## 2025-04-08 RX ADMIN — SODIUM CHLORIDE, POTASSIUM CHLORIDE, SODIUM LACTATE AND CALCIUM CHLORIDE: 600; 310; 30; 20 INJECTION, SOLUTION INTRAVENOUS at 16:23

## 2025-04-08 RX ADMIN — HYDROMORPHONE HYDROCHLORIDE 0.5 MG: 1 INJECTION, SOLUTION INTRAMUSCULAR; INTRAVENOUS; SUBCUTANEOUS at 16:47

## 2025-04-08 RX ADMIN — GLYCOPYRROLATE 0.6 MCG: 0.2 INJECTION, SOLUTION INTRAMUSCULAR; INTRAVENOUS at 16:16

## 2025-04-08 RX ADMIN — ROCURONIUM BROMIDE 50 MG: 10 INJECTION INTRAVENOUS at 15:19

## 2025-04-08 RX ADMIN — BUPIVACAINE HYDROCHLORIDE 30 ML: 2.5 INJECTION, SOLUTION EPIDURAL; INFILTRATION; INTRACAUDAL; PERINEURAL at 15:25

## 2025-04-08 RX ADMIN — HYDROMORPHONE HYDROCHLORIDE 0.5 MG: 1 INJECTION, SOLUTION INTRAMUSCULAR; INTRAVENOUS; SUBCUTANEOUS at 16:55

## 2025-04-08 RX ADMIN — DEXAMETHASONE SODIUM PHOSPHATE 6 MG: 10 INJECTION INTRAMUSCULAR; INTRAVENOUS at 15:29

## 2025-04-08 RX ADMIN — DEXMEDETOMIDINE HYDROCHLORIDE 4 MCG: 100 INJECTION, SOLUTION INTRAVENOUS at 15:28

## 2025-04-08 RX ADMIN — SODIUM CHLORIDE 2000 MG: 900 INJECTION INTRAVENOUS at 15:16

## 2025-04-08 RX ADMIN — LIDOCAINE HYDROCHLORIDE 50 MG: 10 INJECTION, SOLUTION EPIDURAL; INFILTRATION; INTRACAUDAL; PERINEURAL at 15:19

## 2025-04-08 RX ADMIN — OXYCODONE HYDROCHLORIDE AND ACETAMINOPHEN 2 TABLET: 5; 325 TABLET ORAL at 22:10

## 2025-04-08 RX ADMIN — LIDOCAINE HYDROCHLORIDE 50 MG: 10 INJECTION, SOLUTION EPIDURAL; INFILTRATION; INTRACAUDAL; PERINEURAL at 16:16

## 2025-04-08 RX ADMIN — DEXAMETHASONE SODIUM PHOSPHATE 2 MG: 10 INJECTION INTRAMUSCULAR; INTRAVENOUS at 15:25

## 2025-04-08 RX ADMIN — DEXMEDETOMIDINE HYDROCHLORIDE 4 MCG: 100 INJECTION, SOLUTION INTRAVENOUS at 15:51

## 2025-04-08 RX ADMIN — BISACODYL 10 MG: 5 TABLET, COATED ORAL at 18:27

## 2025-04-08 RX ADMIN — FAMOTIDINE 20 MG: 20 TABLET, FILM COATED ORAL at 13:35

## 2025-04-08 RX ADMIN — SODIUM CHLORIDE, POTASSIUM CHLORIDE, SODIUM LACTATE AND CALCIUM CHLORIDE: 600; 310; 30; 20 INJECTION, SOLUTION INTRAVENOUS at 15:17

## 2025-04-08 RX ADMIN — PROPOFOL 180 MG: 10 INJECTION, EMULSION INTRAVENOUS at 15:19

## 2025-04-08 RX ADMIN — NEOSTIGMINE 3 MG: 1 INJECTION INTRAVENOUS at 16:16

## 2025-04-08 RX ADMIN — DEXMEDETOMIDINE HYDROCHLORIDE 4 MCG: 100 INJECTION, SOLUTION INTRAVENOUS at 15:42

## 2025-04-08 RX ADMIN — DOCUSATE SODIUM 100 MG: 100 CAPSULE, LIQUID FILLED ORAL at 20:26

## 2025-04-08 NOTE — ANESTHESIA POSTPROCEDURE EVALUATION
Patient: Lobo Yu    Procedure Summary       Date: 04/08/25 Room / Location:  [facility]    Anesthesia Start: 1516 Anesthesia Stop: 1632    Procedure: INCISIONAL HERNIA REPAIR WITH MESH (Abdomen) Diagnosis:     Surgeons: Maximiliano Jimenez MD Provider: Glen Cordon MD    Anesthesia Type: general with block ASA Status: 3            Anesthesia Type: general with block    Vitals  Vitals Value Taken Time   /79 04/08/25 16:32   Temp 97 °F (36.1 °C) 04/08/25 16:32   Pulse 61 04/08/25 16:32   Resp 10 04/08/25 16:32   SpO2 95 % 04/08/25 16:32           Post Anesthesia Care and Evaluation    Patient location during evaluation: PACU  Patient participation: waiting for patient participation  Level of consciousness: sleepy but conscious  Pain management: adequate    Airway patency: patent  Anesthetic complications: No anesthetic complications  PONV Status: none  Cardiovascular status: hemodynamically stable and acceptable  Respiratory status: nonlabored ventilation, acceptable, nasal cannula and oral airway  Hydration status: acceptable

## 2025-04-08 NOTE — INTERVAL H&P NOTE
Pre-Op H&P (See Recent Office Note Attached for Full H&P)    History and physical note from office reviewed and updated with the following, otherwise there are no changes in H&P:      Review of Systems:  General ROS:  no fever, chills, rashes.  No change since last office visit.  No recent sick exposure  Cardiovascular ROS: no chest pain or dyspnea on exertion  Respiratory ROS: no cough, shortness of breath, or wheezing    Immunization History:   Immunization History   Administered Date(s) Administered    COVID-19 (JOSETTE) 09/01/2021    Pneumococcal Polysaccharide (PPSV23) 03/10/2014    Tdap 12/16/2024       Meds:    No current facility-administered medications on file prior to encounter.     Current Outpatient Medications on File Prior to Encounter   Medication Sig Dispense Refill    aspirin 81 MG EC tablet Take 1 tablet by mouth Daily. Patient ok to continue, hold am on hernia surgery      valACYclovir (VALTREX) 1000 MG tablet TAKE 1 TABLET BY MOUTH TWICE A DAY (Patient taking differently: Take 1 tablet by mouth 2 (Two) Times a Day As Needed.) 180 tablet 3       Vital Signs:  BP= 132/81    HR= 51    SpO2= 95% room air    Temp=98.7    Physical Exam:    CV:  S1S2 regular rate and rhythm, no murmur               Resp:  Clear to auscultation; respirations regular, even and unlabored    Results Review:     Lab Results   Component Value Date    WBC 5.80 03/31/2025    HGB 15.2 03/31/2025    HCT 42.9 03/31/2025    MCV 89.6 03/31/2025     03/31/2025    NEUTROABS 3.64 07/20/2024    GLUCOSE 138 (H) 03/31/2025    BUN 14 03/31/2025    CREATININE 0.96 03/31/2025     03/31/2025    K 4.2 03/31/2025     03/31/2025    CO2 24.0 03/31/2025    MG 2.0 11/25/2022    CALCIUM 9.5 03/31/2025    ALBUMIN 4.7 03/31/2025    AST 28 03/31/2025    ALT 39 03/31/2025    BILITOT 0.5 03/31/2025   I reviewed the patient's new clinical results.    Cancer Staging (if applicable)  Cancer Patient: __ yes __no __unknown; If yes,  clinical stage T:__ N:__M:__, stage group or __N/A    Assessment/Plan:  INCISIONAL HERNIA  /  INCISIONAL HERNIA REPAIR WITH MESH      FARSHAD Bob   4/8/2025   13:23 EDT

## 2025-04-08 NOTE — ANESTHESIA POSTPROCEDURE EVALUATION
Patient: Lobo Yu    Procedure Summary       Date: 04/08/25 [facility]    Anesthesia Start: 1516 Anesthesia Stop: 1632    Procedure: INCISIONAL HERNIA REPAIR WITH MESH (Abdomen) Diagnosis:     Surgeons: Maximiliano Jimenez MD Provider: Glen Cordon MD    Anesthesia Type: general with block ASA Status: 3            Anesthesia Type: general with block    Vitals  Vitals Value Taken Time   /78 04/08/25 16:45   Temp 97 °F (36.1 °C) 04/08/25 16:32   Pulse 59 04/08/25 16:59   Resp 14 04/08/25 16:45   SpO2 99 % 04/08/25 16:59   Vitals shown include unfiled device data.        Post Anesthesia Care and Evaluation    Patient location during evaluation: PACU  Patient participation: complete - patient participated  Level of consciousness: awake and alert  Pain management: adequate    Airway patency: patent  Anesthetic complications: No anesthetic complications  PONV Status: none  Cardiovascular status: hemodynamically stable and acceptable  Respiratory status: nonlabored ventilation, acceptable and nasal cannula  Hydration status: acceptable     Prescription approved per [facility].  Mesha Rehman RN  [facility]

## 2025-04-08 NOTE — BRIEF OP NOTE
VENTRAL/INCISIONAL HERNIA REPAIR  Progress Note    Lobo Yu  4/8/2025    Pre-op Diagnosis:   Incisional hernia       Post-Op Diagnosis Codes:  Same    Procedure(s):      Procedure(s):  INCISIONAL HERNIA REPAIR WITH MESH              Surgeon(s):  Maximiliano Jimenez MD    Anesthesia: General with Block    Staff:   Circulator: Cinthia Haro RN  Scrub Person: Alissa Ramos  Nursing Assistant: Bret Alcantar PCT  Assistant: Julio C Aguero PA-C  Assistant: Julio C Aguero PA-C    Estimated Blood Loss: minimal    Urine Voided: * No values recorded between 4/8/2025  3:17 PM and 4/8/2025  4:14 PM *    Specimens:                None      Drains:   [REMOVED] Closed/Suction Drain 1 RUQ Bulb 15 Fr. (Removed)       [REMOVED] Colostomy LUQ (Removed)       [REMOVED] Urethral Catheter Silicone 16 Fr. (Removed)       Findings: 3 x 4 cm incisional hernia completely reduced and repaired with independent closure of the posterior and anterior fascia, with a 10 x 6 cm piece of Prolene mesh in the retrorectus space      Complications: None    Assistant: Julio C Aguero PA-C  was responsible for performing the following activities: Retraction, Irrigation, and Placing Dressing and their skilled assistance was necessary for the success of this case.    Maximiliano Jimenez MD     Date: 4/8/2025  Time: 16:26 EDT

## 2025-04-08 NOTE — PROGRESS NOTES
"Patient Name:  Lobo Yu  YOB: 1965  [id number]    Surgery Post - Operative Note    Date of visit: 4/8/2025    Subjective   Subjective: Feels OK, Pain controlled.       Objective     Objective:    /79 (BP Location: Right arm, Patient Position: Lying)   Pulse 50   Temp 98.2 °F (36.8 °C) (Oral)   Resp 18   Ht 180.3 cm (71\")   Wt 89.8 kg (198 lb)   SpO2 93%   BMI 27.62 kg/m²     CV:  Rate  regular and rhythm  regular  L:  Clear  to auscultation bilaterally   ABD:  Soft, appropriately tender. Dressings  clean, dry and intact   EXT:  No cyanosis, clubbing or edema             Assessment/ Plan: Doing well after Incisional hernia repair. Continue Pulmonary toilet        Active Hospital Problems    Diagnosis  POA    **Incisional hernia, without obstruction or gangrene [K43.2]  Yes    Incisional hernia [K43.2]  Yes    Gastroesophageal reflux disease [K21.9]  Yes    Essential hypertension [I10]  Yes    Hyperlipidemia [E78.5]  Yes      Resolved Hospital Problems   No resolved problems to display.            Maximiliano Jimenez MD  4/8/2025  18:43 EDT  Feels Ok, api  "

## 2025-04-08 NOTE — ANESTHESIA PROCEDURE NOTES
"Peripheral Block      Patient reassessed immediately prior to procedure    Patient location during procedure: OR  Reason for block: at surgeon's request and post-op pain management  Performed by  CRNA/CAA: Katharine Gooden CRNA  Preanesthetic Checklist  Completed: patient identified, IV checked, site marked, risks and benefits discussed, surgical consent, monitors and equipment checked, pre-op evaluation and timeout performed  Prep:  Pt Position: supine  Sterile barriers:cap, gloves, mask and washed/disinfected hands  Prep: ChloraPrep  Patient monitoring: blood pressure monitoring, continuous pulse oximetry and EKG  Procedure    Sedation: yes  Performed under: general  Guidance:ultrasound guided  Images:still images obtained, printed/placed on chart    Laterality:left  Block Type:TAP  Injection Technique:single-shot  Needle Type:short-bevel and echogenic  Needle Gauge:20 G  Resistance on Injection: none    Medications Used: dexamethasone sodium phosphate injection - Injection   2 mg - 4/8/2025 3:25:00 PM  bupivacaine PF (MARCAINE) 0.25 % injection - Injection   30 mL - 4/8/2025 3:25:00 PM      Medications  Comment:Block Injection:  LA dose divided between Right and Left block        Post Assessment  Injection Assessment: negative aspiration for heme, incremental injection and no paresthesia on injection  Patient Tolerance:comfortable throughout block  Complications:no  Additional Notes    Subcostal TAPs    A high-frequency linear transducer, with sterile cover, was placed sub-xiphoid to identify [place], right and left Rectus Abdominus Muscles (RK). The transducer was moved either right or left subcostally to identify the RK and the Transverse Abdominus Muscle (BRAR). The insertion site was prepped in sterile fashion and then localized with 2-5 ml of 1% Lidocaine. Using ultrasound-guidance, a 20-gauge B-Monge 4\" Ultraplex 360 non-stimulating echogenic needle was advanced in plane, from medial to lateral, " "until the tip of the needle was in the fascial plane between the RK and BRAR. 1-3ml of preservative free normal saline was used to hydro-dissect the fascial planes. After the fascial plane was verified, the local anesthetic (LA) was injected.  Aspiration every 5 ml to prevent intravascular injection. Injection was completed with negative aspiration of blood and negative intravascular injection. Injection pressures were normal with minimal resistance. The subcostal approach to the TAP nerve block ideally anesthetizes the intercostal nerves T6-T9.     Mid-Axillary/Lateral TAPs    A high-frequency linear transducer, with sterile cover, was placed in the midaxillary line between the ASIS and costal margin. The External Oblique Muscle (EOM), Internal Oblique Muscle (IOM), Transverse Abdominus Muscle (BRAR), and Peritoneum were identified. The insertion site was prepped in sterile fashion and then localized with 2-5 ml of 1% Lidocaine. Using ultrasound-guidance, a 20-gauge B-Monge 4\" Ultraplex 360 non-stimulating echogenic needle was advanced in plane, from medial to lateral, until the tip of the needle was in the fascial plane between the IOM and BRAR. 1-3ml of preservative free normal saline was used to hydro-dissect the fascial planes. After the fascial plane was verified, the local anesthetic (LA) was injected. . Aspiration every 5 ml to prevent intravascular injection. Injection was completed with negative aspiration of blood and negative intravascular injection. Injection pressures were normal with minimal resistance. Midaxillary TAPs should reach intercostal nerves T10- T11 and the subcostal nerve T12.    Performed by: Katharine Gooden CRNA            "

## 2025-04-08 NOTE — OP NOTE
OPERATIVE NOTE    Patient Name:  Lobo Yu  YOB: 1965  [id number]    Date of Surgery:  4/8/2025      PREOPERATIVE DIAGNOSIS: Incisional hernia 3 x 4 cm      POSTOPERATIVE DIAGNOSIS: Same        PROCEDURE PERFORMED: Open incisional hernia repair with mesh          SURGEON: Maximiliano Jimenez MD       Circulator: Cinthia Haro RN  Scrub Person: Ngoc Ramosyla  Nursing Assistant: Bret Alcantar PCT  Assistant: Julio C Aguero PA-C       Assistant: Julio C Aguero PA-C    ASSISTANT SURGEON: None       SPECIMENS: None       EBL: Minimal       ANESTHESIA: General.   ASA Score: III       FINDINGS:   1. 3 x 4 cm incisional hernia completely reduced and repaired with independent closure of the posterior and anterior fascia, with a 10 x 6 cm piece of Prolene mesh in the retrorectus space       INDICATIONS: The patient is a 59 y.o. male who presented with an incisional hernia related to prior colostomy.  He is now ready for definitive surgical management.  The risks and benefits were discussed at length with the patient and his family, and they agreed to proceed.         DESCRIPTION OF PROCEDURE:      After obtaining informed consent, the patient was taken to the operating room and placed in supine position. After appropriate DVT and antibiotic prophylaxis, general anesthesia was induced. TAP blocks were placed by the anesthesia staff on the left side to aid in postop pain control. The abdomen  was prepped and draped in standard sterile fashion and covered with an Ioban dressing to prevent contact of mesh with the skin.  An elliptical incision oriented transversely was made around the prior colostomy site.  The overlying scar was discarded.  Electrocautery dissection was carried down to the hernia sac.  This contained only omentum which was able to be reduced.  The fascial edges were circumferentially cleared.    The posterior fascia was taken off the rectus muscle and anterior fascia  circumferentially.  The posterior fascia was then closed using interrupted #1 PDS sutures.  The retrorectus space was then developed.  The fascial defect was roughly 3 x 4 cm, therefore a 10 x 6 cm piece of polypropylene mesh was chosen.  It was placed in the retrorectus space, anterior to the posterior fascia but deep to the rectus muscle.  The rectus muscle was closed loosely on top of this using Vicryl sutures.  The anterior fascia was then closed using interrupted #1 PDS sutures.    The wound was irrigated with antibiotic saline.  The skin closed multiple layers using observable suture.  The incision was dressed in standard sterile fashion, and covered with dry sterile dressing.  An abdominal binder was placed.    All sponge and needle counts were correct times two at the completion of the procedure. The patient recovered from anesthesia, was extubated in the operating room  and was transported to the PACU  in stable condition.    COMPLICATIONS: None     Assistant: Julio C Aguero PA-C  was responsible for performing the following activities: Retraction, Suction, Irrigation, and Placing Dressing and their skilled assistance was necessary for the success of this case.    Maximiliano Jimenez MD  4/8/2025  16:27 EDT        Dictated using Dragon Dictation

## 2025-04-08 NOTE — PLAN OF CARE
Goal Outcome Evaluation:Arrived from PACU to room doorway.  Ambulated from doorway to bed, no fatigue, nausea or ataxia. Binder in use, in place, securing cover-derm dressing. Able to tolerate PO fluids at this time.  Continue to monitor.

## 2025-04-08 NOTE — ANESTHESIA PROCEDURE NOTES
Airway  Reason: elective    Date/Time: 4/8/2025 3:20 PM  Airway not difficult    General Information and Staff    Patient location during procedure: OR  CRNA/CAA: Katharine Gooden CRNA    Indications and Patient Condition  Indications for airway management: airway protection    Preoxygenated: yes  MILS not maintained throughout    Mask difficulty assessment: 2 - vent by mask + OA or adjuvant +/- NMBA    Final Airway Details    Final airway type: endotracheal airway      Successful airway: ETT  Cuffed: yes   Successful intubation technique: direct laryngoscopy  Endotracheal tube insertion site: oral  Blade: Magaly  Blade size: 4  ETT size (mm): 7.5  Cormack-Lehane Classification: grade I - full view of glottis  Placement verified by: chest auscultation and capnometry   Measured from: lips  ETT/EBT  to lips (cm): 21  Number of attempts at approach: 1  Assessment: lips, teeth, and gum same as pre-op and atraumatic intubation    Additional Comments  Negative epigastric sounds, Breath sound equal bilaterally with symmetric chest rise and fall

## 2025-04-09 VITALS
BODY MASS INDEX: 27.72 KG/M2 | WEIGHT: 198 LBS | SYSTOLIC BLOOD PRESSURE: 103 MMHG | RESPIRATION RATE: 16 BRPM | HEIGHT: 71 IN | DIASTOLIC BLOOD PRESSURE: 64 MMHG | TEMPERATURE: 98 F | OXYGEN SATURATION: 94 % | HEART RATE: 56 BPM

## 2025-04-09 LAB
ANION GAP SERPL CALCULATED.3IONS-SCNC: 10 MMOL/L (ref 5–15)
BASOPHILS # BLD AUTO: 0.01 10*3/MM3 (ref 0–0.2)
BASOPHILS NFR BLD AUTO: 0.1 % (ref 0–1.5)
BUN SERPL-MCNC: 10 MG/DL (ref 6–20)
BUN/CREAT SERPL: 12.5 (ref 7–25)
CALCIUM SPEC-SCNC: 9.6 MG/DL (ref 8.6–10.5)
CHLORIDE SERPL-SCNC: 100 MMOL/L (ref 98–107)
CO2 SERPL-SCNC: 28 MMOL/L (ref 22–29)
CREAT SERPL-MCNC: 0.8 MG/DL (ref 0.76–1.27)
DEPRECATED RDW RBC AUTO: 42.9 FL (ref 37–54)
EGFRCR SERPLBLD CKD-EPI 2021: 102.6 ML/MIN/1.73
EOSINOPHIL # BLD AUTO: 0 10*3/MM3 (ref 0–0.4)
EOSINOPHIL NFR BLD AUTO: 0 % (ref 0.3–6.2)
ERYTHROCYTE [DISTWIDTH] IN BLOOD BY AUTOMATED COUNT: 13 % (ref 12.3–15.4)
GLUCOSE SERPL-MCNC: 137 MG/DL (ref 65–99)
HCT VFR BLD AUTO: 37.2 % (ref 37.5–51)
HGB BLD-MCNC: 12.8 G/DL (ref 13–17.7)
IMM GRANULOCYTES # BLD AUTO: 0.04 10*3/MM3 (ref 0–0.05)
IMM GRANULOCYTES NFR BLD AUTO: 0.4 % (ref 0–0.5)
LYMPHOCYTES # BLD AUTO: 1.2 10*3/MM3 (ref 0.7–3.1)
LYMPHOCYTES NFR BLD AUTO: 10.7 % (ref 19.6–45.3)
MCH RBC QN AUTO: 31.2 PG (ref 26.6–33)
MCHC RBC AUTO-ENTMCNC: 34.4 G/DL (ref 31.5–35.7)
MCV RBC AUTO: 90.7 FL (ref 79–97)
MONOCYTES # BLD AUTO: 0.86 10*3/MM3 (ref 0.1–0.9)
MONOCYTES NFR BLD AUTO: 7.7 % (ref 5–12)
NEUTROPHILS NFR BLD AUTO: 81.1 % (ref 42.7–76)
NEUTROPHILS NFR BLD AUTO: 9.13 10*3/MM3 (ref 1.7–7)
NRBC BLD AUTO-RTO: 0 /100 WBC (ref 0–0.2)
PLATELET # BLD AUTO: 231 10*3/MM3 (ref 140–450)
PMV BLD AUTO: 9.7 FL (ref 6–12)
POTASSIUM SERPL-SCNC: 4 MMOL/L (ref 3.5–5.2)
RBC # BLD AUTO: 4.1 10*6/MM3 (ref 4.14–5.8)
SODIUM SERPL-SCNC: 138 MMOL/L (ref 136–145)
WBC NRBC COR # BLD AUTO: 11.24 10*3/MM3 (ref 3.4–10.8)

## 2025-04-09 PROCEDURE — 25010000002 HEPARIN (PORCINE) PER 1000 UNITS: Performed by: SURGERY

## 2025-04-09 PROCEDURE — 85025 COMPLETE CBC W/AUTO DIFF WBC: CPT | Performed by: SURGERY

## 2025-04-09 PROCEDURE — 80048 BASIC METABOLIC PNL TOTAL CA: CPT | Performed by: SURGERY

## 2025-04-09 RX ORDER — PSEUDOEPHEDRINE HCL 30 MG
100 TABLET ORAL 2 TIMES DAILY
Qty: 60 CAPSULE | Refills: 1 | Status: SHIPPED | OUTPATIENT
Start: 2025-04-09

## 2025-04-09 RX ORDER — OXYCODONE AND ACETAMINOPHEN 5; 325 MG/1; MG/1
1 TABLET ORAL EVERY 4 HOURS PRN
Qty: 17 TABLET | Refills: 0 | Status: SHIPPED | OUTPATIENT
Start: 2025-04-09

## 2025-04-09 RX ADMIN — PANTOPRAZOLE SODIUM 40 MG: 40 TABLET, DELAYED RELEASE ORAL at 05:34

## 2025-04-09 RX ADMIN — ATENOLOL 50 MG: 50 TABLET ORAL at 08:23

## 2025-04-09 RX ADMIN — TRIAMTERENE AND HYDROCHLOROTHIAZIDE 1 TABLET: 37.5; 25 TABLET ORAL at 08:23

## 2025-04-09 RX ADMIN — OXYCODONE HYDROCHLORIDE AND ACETAMINOPHEN 2 TABLET: 5; 325 TABLET ORAL at 10:56

## 2025-04-09 RX ADMIN — DOCUSATE SODIUM 100 MG: 100 CAPSULE, LIQUID FILLED ORAL at 08:22

## 2025-04-09 RX ADMIN — BISACODYL 10 MG: 5 TABLET, COATED ORAL at 08:22

## 2025-04-09 RX ADMIN — PANTOPRAZOLE SODIUM 40 MG: 40 TABLET, DELAYED RELEASE ORAL at 08:28

## 2025-04-09 RX ADMIN — HEPARIN SODIUM 5000 UNITS: 5000 INJECTION INTRAVENOUS; SUBCUTANEOUS at 05:34

## 2025-04-09 RX ADMIN — ASPIRIN 81 MG: 81 TABLET, COATED ORAL at 08:23

## 2025-04-09 NOTE — PLAN OF CARE
Goal Outcome Evaluation:  Plan of Care Reviewed With: patient        Progress: improving  Outcome Evaluation: Pt VSS on RA. Ambulating in hallway with SB assist. Pt has minimal complaints of pain. Pt has abd binder in place with border dressing that is C/D/I. Lab draw in Am. Monitoring pt according to POC.

## 2025-04-09 NOTE — NURSING NOTE
Patient approved for discharge per MD order. IV removed intact without issue. Patient remains on RA with VSS, voiding appropriately, tolerating diet, and ambulating appropriately. RN completed discharge instructions including medications prescribed, follow up appointments, activity restrictions, constipation prevention measures, and infection prevention. Patient instructed to  prescriptions at [facility]. Patient f/u appointment changed to May 5th at 1400 per patient request, patient and spouse informed. Patient verbalized understanding and denies any further questions at this time.

## 2025-04-09 NOTE — PROGRESS NOTES
"Patient Name:  Lobo Yu  YOB: 1965  [id number]    Surgery Progress Note    Date of visit: 4/9/2025    Subjective   Subjective: Feels better, wants to go home.         Objective     Objective:     /64 (BP Location: Right arm, Patient Position: Lying)   Pulse 56   Temp 98 °F (36.7 °C) (Temporal)   Resp 16   Ht 180.3 cm (71\")   Wt 89.8 kg (198 lb)   SpO2 94%   BMI 27.62 kg/m²     Intake/Output Summary (Last 24 hours) at 4/9/2025 0759  Last data filed at 4/9/2025 0713  Gross per 24 hour   Intake 1400 ml   Output 1525 ml   Net -125 ml       CV:  Rhythm  regular and rate regular   L:  Clear  to auscultation bilaterally   Abd:  Bowel sounds positive , soft, appropriately tender. Dressing c/d/i  Ext:  No cyanosis, clubbing, edema    Recent labs that are back at this time have been reviewed.            Assessment/ Plan:    Incisional hernia- Doing well after repair. Discharge home. RTC with me in 4  weeks. No lifting > 30 lbs until RTC. May remove dressings in 24 hours, may shower at that time.      Active Hospital Problems    Diagnosis  POA    **Incisional hernia, without obstruction or gangrene [K43.2]  Yes    Incisional hernia [K43.2]  Yes    Gastroesophageal reflux disease [K21.9]  Yes    Essential hypertension [I10]  Yes    Hyperlipidemia [E78.5]  Yes      Resolved Hospital Problems   No resolved problems to display.              Maximiliano Jimenez MD  4/9/2025  07:59 EDT      "

## 2025-04-09 NOTE — PLAN OF CARE
Problem: Adult Inpatient Plan of Care  Goal: Plan of Care Review  Outcome: Adequate for Care Transition  Goal: Patient-Specific Goal (Individualized)  Outcome: Adequate for Care Transition  Goal: Absence of Hospital-Acquired Illness or Injury  Outcome: Adequate for Care Transition  Intervention: Identify and Manage Fall Risk  Recent Flowsheet Documentation  Taken 4/9/2025 1000 by Paola Sinclair RN  Safety Promotion/Fall Prevention:   activity supervised   assistive device/personal items within reach   clutter free environment maintained   fall prevention program maintained   lighting adjusted   gait belt   mobility aid in reach   muscle strengthening facilitated   nonskid shoes/slippers when out of bed   room organization consistent   safety round/check completed   toileting scheduled  Taken 4/9/2025 0830 by Paola Sinclair RN  Safety Promotion/Fall Prevention:   activity supervised   assistive device/personal items within reach   clutter free environment maintained   fall prevention program maintained   lighting adjusted   gait belt   mobility aid in reach   muscle strengthening facilitated   nonskid shoes/slippers when out of bed   room organization consistent   safety round/check completed   toileting scheduled  Intervention: Prevent Skin Injury  Recent Flowsheet Documentation  Taken 4/9/2025 1000 by Paola Sinclair RN  Body Position: position changed independently  Taken 4/9/2025 0830 by Paola Sinclair RN  Body Position: position changed independently  Skin Protection: transparent dressing maintained  Intervention: Prevent and Manage VTE (Venous Thromboembolism) Risk  Recent Flowsheet Documentation  Taken 4/9/2025 0830 by Paola Sinclair RN  VTE Prevention/Management:   bilateral   SCDs (sequential compression devices) off  Intervention: Prevent Infection  Recent Flowsheet Documentation  Taken 4/9/2025 1000 by Paola Sinclair RN  Infection Prevention:   cohorting utilized   environmental surveillance performed   equipment  surfaces disinfected   hand hygiene promoted   rest/sleep promoted   single patient room provided  Taken 4/9/2025 0830 by Paola Sinclair RN  Infection Prevention:   cohorting utilized   environmental surveillance performed   equipment surfaces disinfected   hand hygiene promoted   rest/sleep promoted   single patient room provided  Goal: Optimal Comfort and Wellbeing  Outcome: Adequate for Care Transition  Intervention: Provide Person-Centered Care  Recent Flowsheet Documentation  Taken 4/9/2025 1000 by Paola Sinclair RN  Trust Relationship/Rapport:   care explained   choices provided   emotional support provided   questions answered   empathic listening provided   questions encouraged   reassurance provided   thoughts/feelings acknowledged  Taken 4/9/2025 0830 by Paola Sinclair RN  Trust Relationship/Rapport:   care explained   choices provided   emotional support provided   empathic listening provided   questions answered   questions encouraged   reassurance provided   thoughts/feelings acknowledged  Goal: Readiness for Transition of Care  Outcome: Adequate for Care Transition     Problem: Surgery Nonspecified  Goal: Absence of Bleeding  Outcome: Adequate for Care Transition  Intervention: Monitor and Manage Bleeding  Recent Flowsheet Documentation  Taken 4/9/2025 0830 by Paola Sinclair RN  Bleeding Management: dressing monitored  Goal: Effective Bowel Elimination  Outcome: Adequate for Care Transition  Intervention: Enhance Bowel Motility and Elimination  Recent Flowsheet Documentation  Taken 4/9/2025 0830 by Paola Sinclair RN  Bowel Elimination Management: (see MAR) other (see comments)  Bowel Motility Enhancement:   ambulation promoted   fluid intake encouraged   oral intake encouraged  Goal: Fluid and Electrolyte Balance  Outcome: Adequate for Care Transition  Intervention: Monitor and Manage Fluid and Electrolyte Balance  Recent Flowsheet Documentation  Taken 4/9/2025 0830 by Paola Sinclair RN  Fluid/Electrolyte  Management: fluids provided  Goal: Blood Glucose Level Within Target Range  Outcome: Adequate for Care Transition  Goal: Absence of Infection Signs and Symptoms  Outcome: Adequate for Care Transition  Intervention: Prevent or Manage Infection  Recent Flowsheet Documentation  Taken 4/9/2025 0830 by Paola Sinclair RN  Infection Management: aseptic technique maintained  Goal: Optimal Pain Control and Function  Outcome: Adequate for Care Transition  Intervention: Prevent or Manage Pain  Recent Flowsheet Documentation  Taken 4/9/2025 1000 by Paola Sinclair RN  Diversional Activities: television  Taken 4/9/2025 0830 by Paola Sinclair RN  Diversional Activities:   television   smartphone  Goal: Nausea and Vomiting Relief  Outcome: Adequate for Care Transition  Goal: Effective Urinary Elimination  Outcome: Adequate for Care Transition  Intervention: Monitor and Manage Urinary Retention  Recent Flowsheet Documentation  Taken 4/9/2025 0830 by Paola Sinclair RN  Urinary Elimination Promotion: voiding relaxation promoted  Goal: Effective Oxygenation and Ventilation  Outcome: Adequate for Care Transition  Intervention: Optimize Oxygenation and Ventilation  Recent Flowsheet Documentation  Taken 4/9/2025 1000 by Paola Sinclair RN  Activity Management: activity encouraged  Head of Bed (HOB) Positioning: HOB elevated  Taken 4/9/2025 0830 by Paola Sinclair RN  Activity Management: activity encouraged  Head of Bed (HOB) Positioning: HOB elevated  Airway/Ventilation Management:   airway patency maintained   calming measures promoted   position adjusted   pulmonary hygiene promoted  Cough And Deep Breathing: done independently per patient     Problem: Comorbidity Management  Goal: Blood Pressure in Desired Range  Outcome: Adequate for Care Transition  Intervention: Maintain Blood Pressure Management  Recent Flowsheet Documentation  Taken 4/9/2025 1000 by Paola Sinclair RN  Medication Review/Management: medications reviewed  Taken 4/9/2025 0830 by  Sinclair, Paola, RN  Medication Review/Management: medications reviewed   Goal Outcome Evaluation:

## 2025-04-09 NOTE — CASE MANAGEMENT/SOCIAL WORK
Case Management Discharge Note      Final Note: Home         Selected Continued Care - Admitted Since 4/8/2025       Destination    No services have been selected for the patient.                Durable Medical Equipment    No services have been selected for the patient.                Dialysis/Infusion    No services have been selected for the patient.                Home Medical Care    No services have been selected for the patient.                Therapy    No services have been selected for the patient.                Community Resources    No services have been selected for the patient.                Community & DME    No services have been selected for the patient.                         Final Discharge Disposition Code: 01 - home or self-care

## 2025-04-18 DIAGNOSIS — Z76.0 MEDICATION REFILL: ICD-10-CM

## 2025-04-18 RX ORDER — VALACYCLOVIR HYDROCHLORIDE 1 G/1
1000 TABLET, FILM COATED ORAL 2 TIMES DAILY
Qty: 180 TABLET | Refills: 3 | Status: SHIPPED | OUTPATIENT
Start: 2025-04-18

## (undated) DEVICE — POOLE SUCTION INSTRUMENT WITH REMOVABLE SHEATH: Brand: POOLE

## (undated) DEVICE — Device

## (undated) DEVICE — PATIENT RETURN ELECTRODE, SINGLE-USE, CONTACT QUALITY MONITORING, ADULT, WITH 9FT CORD, FOR PATIENTS WEIGING OVER 33LBS. (15KG): Brand: MEGADYNE

## (undated) DEVICE — SUT VIC 0 TIES 18IN J912G

## (undated) DEVICE — GLV SURG PREMIERPRO MIC LTX PF SZ8 BRN

## (undated) DEVICE — DBD-DRAPE,LAP,CHOLE,W/TROUGHS,STERILE: Brand: MEDLINE

## (undated) DEVICE — LEGGINGS, PAIR, 29X43, STERILE: Brand: MEDLINE

## (undated) DEVICE — LEX GENERAL ABDOMINAL SPLIT: Brand: MEDLINE INDUSTRIES, INC.

## (undated) DEVICE — SYR LL TP 10ML STRL

## (undated) DEVICE — PENCL SMOKE/EVAC MEGADYNE TELESCP 10FT

## (undated) DEVICE — GLV SURG PREMIERPRO GAMMEX NEOPRN PF SZ7.5 GRN

## (undated) DEVICE — BNDR ABD PREMIUM/UNIV 10IN 27TO48IN

## (undated) DEVICE — MEDI-VAC YANKAUER SUCTION HANDLE: Brand: CARDINAL HEALTH

## (undated) DEVICE — PREMIUM DRY TRAY LF: Brand: MEDLINE INDUSTRIES, INC.

## (undated) DEVICE — SUT SILK 2/0 TIES 18IN A185H

## (undated) DEVICE — DRSNG SURESITE WNDW 4X4.5

## (undated) DEVICE — SUT PROLN 2/0 V7 36IN 8977H

## (undated) DEVICE — DRSNG WND BORDR/ADHS NONADHR/GZ LF 4X10IN STRL

## (undated) DEVICE — SUT PDS 1 CT1 18IN Z741D

## (undated) DEVICE — SUT PDS LP 1 TP1 96IN VIO PDP880GA

## (undated) DEVICE — INTENDED FOR TISSUE SEPARATION, AND OTHER PROCEDURES THAT REQUIRE A SHARP SURGICAL BLADE TO PUNCTURE OR CUT.: Brand: BARD-PARKER ® STAINLESS STEEL BLADES

## (undated) DEVICE — BLD SCLPL SS NO24 STRL

## (undated) DEVICE — TUBING, SUCTION, 1/4" X 10', STRAIGHT: Brand: MEDLINE

## (undated) DEVICE — GLV SURG SENSICARE PI MIC PF SZ7.5 LF STRL

## (undated) DEVICE — ADHS LIQ MASTISOL 2/3ML

## (undated) DEVICE — ANTIBACTERIAL UNDYED BRAIDED (POLYGLACTIN 910), SYNTHETIC ABSORBABLE SUTURE: Brand: COATED VICRYL

## (undated) DEVICE — SUT VIC 3/0 TIES J104T

## (undated) DEVICE — 450 ML BOTTLE OF 0.05% CHLORHEXIDINE GLUCONATE IN 99.95% STERILE WATER FOR IRRIGATION, USP AND APPLICATOR.: Brand: IRRISEPT ANTIMICROBIAL WOUND LAVAGE

## (undated) DEVICE — SUT PDS 4/0 RB1 27IN VIL PDP304H

## (undated) DEVICE — SUT SILK 3/0 TIES 18IN A184H

## (undated) DEVICE — GLV SURG PREMIERPRO MIC LTX PF SZ7.5 BRN

## (undated) DEVICE — SUT SILK 3/0 SH CR8 18IN C013D

## (undated) DEVICE — SUT PDS 1 CTX 36IN VIO PDP371T

## (undated) DEVICE — 3M™ IOBAN™ 2 ANTIMICROBIAL INCISE DRAPE 6650EZ: Brand: IOBAN™ 2

## (undated) DEVICE — BLAKE SILICONE DRAIN, 15 FR ROUND, HUBLESS WITH 3/16" TROCAR: Brand: BLAKE

## (undated) DEVICE — SAFESECURE,SECUREMENT,FOLEY CATH,STERILE: Brand: MEDLINE

## (undated) DEVICE — GOWN SURG ORBIS LVL3 2XL STRL

## (undated) DEVICE — SPNG LAP PREWSH SFTPK 18X18IN STRL PK/5

## (undated) DEVICE — STRIP,CLOSURE,WOUND,MEDI-STRIP,1/2X4: Brand: MEDLINE

## (undated) DEVICE — GLV SURG SENSICARE PI MIC PF SZ7 LF STRL

## (undated) DEVICE — TOWEL,OR,DSP,ST,BLUE,STD,4/PK,20PK/CS: Brand: MEDLINE

## (undated) DEVICE — STPCK 4WY ON/OFF VLV M/COLAR FIT 45PSI STRL

## (undated) DEVICE — SUT PDS O CT1 CR/8 18IN Z740D

## (undated) DEVICE — APPL CHLORAPREP TINTED 26ML TEAL

## (undated) DEVICE — 3M™ IOBAN™ 2 ANTIMICROBIAL INCISE DRAPE 6651EZ: Brand: IOBAN™ 2

## (undated) DEVICE — MARYLAND JAW LAPAROSCOPIC SEALER/DIVIDER COATED: Brand: LIGASURE

## (undated) DEVICE — LEX BASIC NO DRAPE: Brand: MEDLINE INDUSTRIES, INC.

## (undated) DEVICE — TRAP FLD MINIVAC MEGADYNE 100ML

## (undated) DEVICE — SYR LUERLOK 30CC

## (undated) DEVICE — CATH URETRL FLXITP POLLACK STD 5F 70CM

## (undated) DEVICE — DRAPE,UNDERBUTTOCKS,STERILE: Brand: MEDLINE

## (undated) DEVICE — NITINOL WIRE WITH HYDROPHILIC TIP: Brand: SENSOR

## (undated) DEVICE — TBG PENCL TELESCP MEGADYNE SMOKE EVAC 10FT